# Patient Record
Sex: MALE | Race: WHITE | NOT HISPANIC OR LATINO | Employment: OTHER | URBAN - METROPOLITAN AREA
[De-identification: names, ages, dates, MRNs, and addresses within clinical notes are randomized per-mention and may not be internally consistent; named-entity substitution may affect disease eponyms.]

---

## 2017-10-11 ENCOUNTER — HOSPITAL ENCOUNTER (INPATIENT)
Facility: HOSPITAL | Age: 77
LOS: 9 days | Discharge: HOME HEALTH | End: 2017-10-20
Attending: INTERNAL MEDICINE | Admitting: INTERNAL MEDICINE

## 2017-10-11 DIAGNOSIS — Z74.09 IMPAIRED FUNCTIONAL MOBILITY, BALANCE, GAIT, AND ENDURANCE: ICD-10-CM

## 2017-10-11 DIAGNOSIS — Z78.9 IMPAIRED MOBILITY AND ADLS: ICD-10-CM

## 2017-10-11 DIAGNOSIS — Z74.09 IMPAIRED MOBILITY AND ADLS: ICD-10-CM

## 2017-10-11 DIAGNOSIS — E11.59 TYPE 2 DIABETES MELLITUS WITH OTHER CIRCULATORY COMPLICATION, UNSPECIFIED LONG TERM INSULIN USE STATUS: Chronic | ICD-10-CM

## 2017-10-11 DIAGNOSIS — C18.9 COLON CANCER (HCC): ICD-10-CM

## 2017-10-11 DIAGNOSIS — R13.13 PHARYNGEAL DYSPHAGIA: Primary | ICD-10-CM

## 2017-10-11 PROBLEM — I20.0 UNSTABLE ANGINA (HCC): Status: ACTIVE | Noted: 2017-10-11

## 2017-10-11 LAB
GLUCOSE BLDC GLUCOMTR-MCNC: 122 MG/DL (ref 70–130)
GLUCOSE BLDC GLUCOMTR-MCNC: 136 MG/DL (ref 70–130)
GLUCOSE BLDC GLUCOMTR-MCNC: 198 MG/DL (ref 70–130)

## 2017-10-11 PROCEDURE — 87070 CULTURE OTHR SPECIMN AEROBIC: CPT | Performed by: INTERNAL MEDICINE

## 2017-10-11 PROCEDURE — 82962 GLUCOSE BLOOD TEST: CPT

## 2017-10-11 PROCEDURE — 63710000001 INSULIN LISPRO (HUMAN) PER 5 UNITS: Performed by: INTERNAL MEDICINE

## 2017-10-11 PROCEDURE — 87077 CULTURE AEROBIC IDENTIFY: CPT | Performed by: INTERNAL MEDICINE

## 2017-10-11 PROCEDURE — 87186 SC STD MICRODIL/AGAR DIL: CPT | Performed by: INTERNAL MEDICINE

## 2017-10-11 PROCEDURE — 93005 ELECTROCARDIOGRAM TRACING: CPT | Performed by: INTERNAL MEDICINE

## 2017-10-11 PROCEDURE — 87205 SMEAR GRAM STAIN: CPT | Performed by: INTERNAL MEDICINE

## 2017-10-11 PROCEDURE — 87147 CULTURE TYPE IMMUNOLOGIC: CPT | Performed by: INTERNAL MEDICINE

## 2017-10-11 RX ORDER — ASPIRIN 81 MG/1
81 TABLET ORAL DAILY
Status: DISCONTINUED | OUTPATIENT
Start: 2017-10-11 | End: 2017-10-12

## 2017-10-11 RX ORDER — DIPHENHYDRAMINE HCL 50 MG
50 CAPSULE ORAL NIGHTLY PRN
COMMUNITY

## 2017-10-11 RX ORDER — CEPHALEXIN 500 MG/1
500 CAPSULE ORAL 4 TIMES DAILY
COMMUNITY
End: 2017-10-20 | Stop reason: HOSPADM

## 2017-10-11 RX ORDER — LEVOTHYROXINE SODIUM 0.07 MG/1
75 TABLET ORAL DAILY
COMMUNITY

## 2017-10-11 RX ORDER — DIPHENHYDRAMINE HCL 50 MG
50 CAPSULE ORAL NIGHTLY PRN
Status: DISCONTINUED | OUTPATIENT
Start: 2017-10-11 | End: 2017-10-12

## 2017-10-11 RX ORDER — DEXTROSE MONOHYDRATE 25 G/50ML
25 INJECTION, SOLUTION INTRAVENOUS
Status: DISCONTINUED | OUTPATIENT
Start: 2017-10-11 | End: 2017-10-17

## 2017-10-11 RX ORDER — ALUMINA, MAGNESIA, AND SIMETHICONE 2400; 2400; 240 MG/30ML; MG/30ML; MG/30ML
30 SUSPENSION ORAL EVERY 6 HOURS PRN
COMMUNITY

## 2017-10-11 RX ORDER — ERGOCALCIFEROL 1.25 MG/1
5000 CAPSULE ORAL DAILY
COMMUNITY

## 2017-10-11 RX ORDER — UREA 10 %
1 LOTION (ML) TOPICAL DAILY
COMMUNITY

## 2017-10-11 RX ORDER — NICOTINE POLACRILEX 4 MG
15 LOZENGE BUCCAL
Status: DISCONTINUED | OUTPATIENT
Start: 2017-10-11 | End: 2017-10-17

## 2017-10-11 RX ORDER — VALSARTAN 160 MG/1
160 TABLET ORAL DAILY
COMMUNITY

## 2017-10-11 RX ORDER — ACETAMINOPHEN 325 MG/1
650 TABLET ORAL EVERY 4 HOURS PRN
Status: DISCONTINUED | OUTPATIENT
Start: 2017-10-11 | End: 2017-10-12

## 2017-10-11 RX ORDER — ACETAMINOPHEN 325 MG/1
650 TABLET ORAL EVERY 4 HOURS PRN
COMMUNITY

## 2017-10-11 RX ORDER — LEVOTHYROXINE SODIUM 0.07 MG/1
75 TABLET ORAL
Status: DISCONTINUED | OUTPATIENT
Start: 2017-10-12 | End: 2017-10-20 | Stop reason: HOSPADM

## 2017-10-11 RX ORDER — ONDANSETRON 4 MG/1
4 TABLET, ORALLY DISINTEGRATING ORAL EVERY 6 HOURS PRN
COMMUNITY
End: 2017-10-20 | Stop reason: HOSPADM

## 2017-10-11 RX ORDER — VALSARTAN 160 MG/1
160 TABLET ORAL
Status: DISCONTINUED | OUTPATIENT
Start: 2017-10-12 | End: 2017-10-20 | Stop reason: HOSPADM

## 2017-10-11 RX ORDER — WARFARIN SODIUM 7.5 MG/1
7.5 TABLET ORAL
COMMUNITY

## 2017-10-11 RX ADMIN — INSULIN LISPRO 2 UNITS: 100 INJECTION, SOLUTION INTRAVENOUS; SUBCUTANEOUS at 22:02

## 2017-10-11 RX ADMIN — ASPIRIN 81 MG: 81 TABLET, COATED ORAL at 22:02

## 2017-10-11 NOTE — NURSING NOTE
"  ACC REVIEW REPORT: [facility]        PATIENT NAME: Jasmyne Hannon    PATIENT ID: [id number]    BED: 9801    BED TYPE: [facility]    BED GIVEN TO: MIREILLE HOLMAN RN    TIME BED GIVEN: 1551    YOB: 1940    AGE: 77    GENDER: MALE    PREVIOUS ADMIT TO [facility]:      PREVIOUS ADMISSION DATE:      PATIENT CLASS:      TODAY'S DATE: 10/11/2017    TRANSFER DATE: 10/11/17    ETA:      TRANSFERRING FACILITY: [facility]    TRANSFERRING FACILITY PHONE # : [phone]    TRANSFERRING MD:  DR. DAMICO    DATE/TIME REQUEST RECEIVED: 10/11/17 1520    [facility] RN: MARGARITO BRYSON RN    REPORT FROM: ABISAI MCDONNELL RN AND MIREILLE HOLMAN RN    TIME REPORT TAKEN: 1551    DIAGNOSIS: ABNORMAL STRESS TEST AND [place]    REASON FOR TRANSFER TO [facility]: HIGHER LEVEL OF CARE    TRANSPORTATION: AMBULANCE    CLINICAL REASON FOR TRANSFER TO [facility]: ADM 10/5/17 WITH GI BLEED. RECEIVED \"A COUPLE OF UNITS OF BLOOD\"  HAD A COLONOSCOPY THAT SHOWED A MASS. A STRESS TEST WAS DONE TO CLEAR PT FOR SURGERY. THE STRESS  TEST CAME BACK SHOWING ISCHEMIA. PT SENT HERE FOR HEART CATH.      CLINICAL INFORMATION    ALLERGIES: NKA    DURAND: NO    INFECTIOUS DISEASE: NO    ISOLATION: NO    LAST VITAL SIGNS:  TIME: 0800  TEMP: 98.3  PULSE: 51  B/P: 149/91  RESP: 20    LAB INFORMATION: NURSE STATES SHE DOES NOT HAVE ANY LABS TO GIVE ME.    MEDS/IV FLUIDS: LEFT AC 20G SL      CARDIAC SYSTEM:    CHEST PAIN: NO    RHYTHM: SINUS BRADYCARDIA    Is patient taking or has patient been given any drugs that could increase bleeding? NURSE IS NOT SURE  (Plavix, Brilinta, Effient, Eliquis, Xarelto, Warfarin, Integrilin, Angiomax)      RESPIRATORY SYSTEM:    OXYGEN: NO    O2 SAT: 94%    RESPIRATORY STATUS: NO S/S RESPIRATORY DISTRESS      CNS/MUSCULOSKELETAL    ALERT AND ORIENTED:    PERSON: Y  PLACE: Y  TIME: Y    XI COMA SCALE:    E: 4  M: 6  V: 5    GI//GY      GI//GY NOTES: HAS BEEN NPO SINCE [place] EXCEPT FOR COKE AFTER STRESS TEST    PAST MEDICAL HISTORY: " HTN,CHF,DM,ANEMIA,HYPOTHYROIDISM,PVD,STOMACH ULCERS,ANXIETY,DEPRESSION,  RIGHT BKA AND CHARCOT FOOT    ADDITIONAL NOTES: AMBULATES WITH PROSTHETIC LEG. HAS MEPILEX ON COCCYX OVER PILONIDAL CYST.PT IS [facility].          Angela Ibanez RN  10/11/2017  4:15 PM

## 2017-10-12 ENCOUNTER — APPOINTMENT (OUTPATIENT)
Dept: CARDIOLOGY | Facility: HOSPITAL | Age: 77
End: 2017-10-12
Attending: INTERNAL MEDICINE

## 2017-10-12 ENCOUNTER — HOSPITAL ENCOUNTER (OUTPATIENT)
Facility: HOSPITAL | Age: 77
Setting detail: HOSPITAL OUTPATIENT SURGERY
End: 2017-10-12
Attending: INTERNAL MEDICINE | Admitting: INTERNAL MEDICINE

## 2017-10-12 PROBLEM — C18.9 COLON CANCER (HCC): Status: ACTIVE | Noted: 2017-10-12

## 2017-10-12 PROBLEM — G47.33 OSA (OBSTRUCTIVE SLEEP APNEA): Status: ACTIVE | Noted: 2017-10-12

## 2017-10-12 PROBLEM — I73.9 PVD (PERIPHERAL VASCULAR DISEASE): Status: ACTIVE | Noted: 2017-10-12

## 2017-10-12 PROBLEM — I24.9 ACS (ACUTE CORONARY SYNDROME): Status: RESOLVED | Noted: 2017-10-12 | Resolved: 2017-10-12

## 2017-10-12 PROBLEM — I10 ESSENTIAL HYPERTENSION: Chronic | Status: ACTIVE | Noted: 2017-10-12

## 2017-10-12 PROBLEM — E03.9 ACQUIRED HYPOTHYROIDISM: Status: ACTIVE | Noted: 2017-10-12

## 2017-10-12 PROBLEM — E11.59 TYPE 2 DIABETES MELLITUS WITH CIRCULATORY DISORDER (HCC): Chronic | Status: ACTIVE | Noted: 2017-10-12

## 2017-10-12 PROBLEM — I24.9 ACS (ACUTE CORONARY SYNDROME): Status: ACTIVE | Noted: 2017-10-12

## 2017-10-12 PROBLEM — I20.0 UNSTABLE ANGINA: Status: RESOLVED | Noted: 2017-10-11 | Resolved: 2017-10-12

## 2017-10-12 PROBLEM — D50.0 IRON DEFICIENCY ANEMIA DUE TO CHRONIC BLOOD LOSS: Status: ACTIVE | Noted: 2017-10-12

## 2017-10-12 LAB
ANION GAP SERPL CALCULATED.3IONS-SCNC: 6 MMOL/L (ref 3–11)
ARTICHOKE IGE QN: 83 MG/DL (ref 0–130)
BASOPHILS # BLD AUTO: 0.03 10*3/MM3 (ref 0–0.2)
BASOPHILS NFR BLD AUTO: 0.4 % (ref 0–1)
BH CV ECHO MEAS - AO MAX PG (FULL): 21.8 MMHG
BH CV ECHO MEAS - AO MAX PG: 27.9 MMHG
BH CV ECHO MEAS - AO MEAN PG (FULL): 11 MMHG
BH CV ECHO MEAS - AO MEAN PG: 14 MMHG
BH CV ECHO MEAS - AO ROOT AREA (BSA CORRECTED): 1.3
BH CV ECHO MEAS - AO ROOT AREA: 8.6 CM^2
BH CV ECHO MEAS - AO ROOT DIAM: 3.3 CM
BH CV ECHO MEAS - AO V2 MAX: 264 CM/SEC
BH CV ECHO MEAS - AO V2 MEAN: 169 CM/SEC
BH CV ECHO MEAS - AO V2 VTI: 48.6 CM
BH CV ECHO MEAS - AVA(I,A): 1.7 CM^2
BH CV ECHO MEAS - AVA(I,D): 1.7 CM^2
BH CV ECHO MEAS - AVA(V,A): 1.6 CM^2
BH CV ECHO MEAS - AVA(V,D): 1.6 CM^2
BH CV ECHO MEAS - BSA(HAYCOCK): 2.7 M^2
BH CV ECHO MEAS - BSA: 2.6 M^2
BH CV ECHO MEAS - BZI_BMI: 37.9 KILOGRAMS/M^2
BH CV ECHO MEAS - BZI_METRIC_HEIGHT: 188 CM
BH CV ECHO MEAS - BZI_METRIC_WEIGHT: 133.8 KG
BH CV ECHO MEAS - CONTRAST EF (2CH): 75.8 ML/M^2
BH CV ECHO MEAS - CONTRAST EF 4CH: 82.5 ML/M^2
BH CV ECHO MEAS - EDV(CUBED): 119.1 ML
BH CV ECHO MEAS - EDV(MOD-SP2): 124 ML
BH CV ECHO MEAS - EDV(MOD-SP4): 166 ML
BH CV ECHO MEAS - EDV(TEICH): 113.9 ML
BH CV ECHO MEAS - EF(CUBED): 77.3 %
BH CV ECHO MEAS - EF(MOD-SP2): 75.8 %
BH CV ECHO MEAS - EF(MOD-SP4): 82.5 %
BH CV ECHO MEAS - EF(TEICH): 69.3 %
BH CV ECHO MEAS - ESV(CUBED): 27 ML
BH CV ECHO MEAS - ESV(MOD-SP2): 30 ML
BH CV ECHO MEAS - ESV(MOD-SP4): 29 ML
BH CV ECHO MEAS - ESV(TEICH): 35 ML
BH CV ECHO MEAS - FS: 39 %
BH CV ECHO MEAS - IVS/LVPW: 1
BH CV ECHO MEAS - IVSD: 1 CM
BH CV ECHO MEAS - LA DIMENSION: 3.7 CM
BH CV ECHO MEAS - LA/AO: 1.1
BH CV ECHO MEAS - LAT PEAK E' VEL: 6.6 CM/SEC
BH CV ECHO MEAS - LV DIASTOLIC VOL/BSA (35-75): 64.8 ML/M^2
BH CV ECHO MEAS - LV MASS(C)D: 180.8 GRAMS
BH CV ECHO MEAS - LV MASS(C)DI: 70.5 GRAMS/M^2
BH CV ECHO MEAS - LV MAX PG: 6.1 MMHG
BH CV ECHO MEAS - LV MEAN PG: 3 MMHG
BH CV ECHO MEAS - LV SYSTOLIC VOL/BSA (12-30): 11.3 ML/M^2
BH CV ECHO MEAS - LV V1 MAX: 123 CM/SEC
BH CV ECHO MEAS - LV V1 MEAN: 79.1 CM/SEC
BH CV ECHO MEAS - LV V1 VTI: 23.2 CM
BH CV ECHO MEAS - LVIDD: 4.9 CM
BH CV ECHO MEAS - LVIDS: 3 CM
BH CV ECHO MEAS - LVLD AP2: 8.2 CM
BH CV ECHO MEAS - LVLD AP4: 8.4 CM
BH CV ECHO MEAS - LVLS AP2: 7 CM
BH CV ECHO MEAS - LVLS AP4: 6.9 CM
BH CV ECHO MEAS - LVOT AREA (M): 3.5 CM^2
BH CV ECHO MEAS - LVOT AREA: 3.5 CM^2
BH CV ECHO MEAS - LVOT DIAM: 2.1 CM
BH CV ECHO MEAS - LVPWD: 1 CM
BH CV ECHO MEAS - MED PEAK E' VEL: 5.45 CM/SEC
BH CV ECHO MEAS - MV DEC TIME: 0.27 SEC
BH CV ECHO MEAS - MV E MAX VEL: 80.9 CM/SEC
BH CV ECHO MEAS - PA ACC SLOPE: 690 CM/SEC^2
BH CV ECHO MEAS - PA ACC TIME: 0.09 SEC
BH CV ECHO MEAS - PA PR(ACCEL): 37.6 MMHG
BH CV ECHO MEAS - RVDD: 4.3 CM
BH CV ECHO MEAS - SI(AO): 162.2 ML/M^2
BH CV ECHO MEAS - SI(CUBED): 35.9 ML/M^2
BH CV ECHO MEAS - SI(LVOT): 31.3 ML/M^2
BH CV ECHO MEAS - SI(MOD-SP2): 36.7 ML/M^2
BH CV ECHO MEAS - SI(MOD-SP4): 53.4 ML/M^2
BH CV ECHO MEAS - SI(TEICH): 30.8 ML/M^2
BH CV ECHO MEAS - SV(AO): 415.7 ML
BH CV ECHO MEAS - SV(CUBED): 92.1 ML
BH CV ECHO MEAS - SV(LVOT): 80.4 ML
BH CV ECHO MEAS - SV(MOD-SP2): 94 ML
BH CV ECHO MEAS - SV(MOD-SP4): 137 ML
BH CV ECHO MEAS - SV(TEICH): 78.9 ML
BH CV ECHO MEAS - TAPSE (>1.6): 1.9 CM2
BH CV ECHO MEAS - TR MAX VEL: 249 CM/SEC
BH CV XLRA - RV BASE: 4.4 CM
BH CV XLRA - RV LENGTH: 8.4 CM
BH CV XLRA - RV MID: 3.9 CM
BH CV XLRA - TDI S': 9.4 CM/SEC
BUN BLD-MCNC: 22 MG/DL (ref 9–23)
BUN/CREAT SERPL: 20 (ref 7–25)
CALCIUM SPEC-SCNC: 9.4 MG/DL (ref 8.7–10.4)
CHLORIDE SERPL-SCNC: 103 MMOL/L (ref 99–109)
CHOLEST SERPL-MCNC: 124 MG/DL (ref 0–200)
CO2 SERPL-SCNC: 26 MMOL/L (ref 20–31)
CREAT BLD-MCNC: 1.1 MG/DL (ref 0.6–1.3)
DEPRECATED RDW RBC AUTO: 53.7 FL (ref 37–54)
E/E' RATIO: 13.6
EOSINOPHIL # BLD AUTO: 0.37 10*3/MM3 (ref 0–0.3)
EOSINOPHIL NFR BLD AUTO: 5.3 % (ref 0–3)
ERYTHROCYTE [DISTWIDTH] IN BLOOD BY AUTOMATED COUNT: 16 % (ref 11.3–14.5)
GFR SERPL CREATININE-BSD FRML MDRD: 65 ML/MIN/1.73
GLUCOSE BLD-MCNC: 150 MG/DL (ref 70–100)
GLUCOSE BLDC GLUCOMTR-MCNC: 127 MG/DL (ref 70–130)
GLUCOSE BLDC GLUCOMTR-MCNC: 145 MG/DL (ref 70–130)
GLUCOSE BLDC GLUCOMTR-MCNC: 150 MG/DL (ref 70–130)
GLUCOSE BLDC GLUCOMTR-MCNC: 151 MG/DL (ref 70–130)
GLUCOSE BLDC GLUCOMTR-MCNC: 163 MG/DL (ref 70–130)
HCT VFR BLD AUTO: 28.8 % (ref 38.9–50.9)
HDLC SERPL-MCNC: 32 MG/DL (ref 40–60)
HGB BLD-MCNC: 9 G/DL (ref 13.1–17.5)
IMM GRANULOCYTES # BLD: 0.02 10*3/MM3 (ref 0–0.03)
IMM GRANULOCYTES NFR BLD: 0.3 % (ref 0–0.6)
LEFT ATRIUM VOLUME INDEX: 36.3 ML/M2
LYMPHOCYTES # BLD AUTO: 1.62 10*3/MM3 (ref 0.6–4.8)
LYMPHOCYTES NFR BLD AUTO: 23.1 % (ref 24–44)
MCH RBC QN AUTO: 29.3 PG (ref 27–31)
MCHC RBC AUTO-ENTMCNC: 31.3 G/DL (ref 32–36)
MCV RBC AUTO: 93.8 FL (ref 80–99)
MONOCYTES # BLD AUTO: 1.04 10*3/MM3 (ref 0–1)
MONOCYTES NFR BLD AUTO: 14.8 % (ref 0–12)
NEUTROPHILS # BLD AUTO: 3.93 10*3/MM3 (ref 1.5–8.3)
NEUTROPHILS NFR BLD AUTO: 56.1 % (ref 41–71)
PLATELET # BLD AUTO: 234 10*3/MM3 (ref 150–450)
PMV BLD AUTO: 9.7 FL (ref 6–12)
POTASSIUM BLD-SCNC: 4.1 MMOL/L (ref 3.5–5.5)
RBC # BLD AUTO: 3.07 10*6/MM3 (ref 4.2–5.76)
SODIUM BLD-SCNC: 135 MMOL/L (ref 132–146)
TRIGL SERPL-MCNC: 130 MG/DL (ref 0–150)
TROPONIN I SERPL-MCNC: 0.02 NG/ML
WBC NRBC COR # BLD: 7.01 10*3/MM3 (ref 3.5–10.8)

## 2017-10-12 PROCEDURE — 85025 COMPLETE CBC W/AUTO DIFF WBC: CPT | Performed by: INTERNAL MEDICINE

## 2017-10-12 PROCEDURE — 86900 BLOOD TYPING SEROLOGIC ABO: CPT

## 2017-10-12 PROCEDURE — 0 IOPAMIDOL PER 1 ML: Performed by: INTERNAL MEDICINE

## 2017-10-12 PROCEDURE — B2111ZZ FLUOROSCOPY OF MULTIPLE CORONARY ARTERIES USING LOW OSMOLAR CONTRAST: ICD-10-PCS | Performed by: INTERNAL MEDICINE

## 2017-10-12 PROCEDURE — C1769 GUIDE WIRE: HCPCS | Performed by: INTERNAL MEDICINE

## 2017-10-12 PROCEDURE — G0378 HOSPITAL OBSERVATION PER HR: HCPCS

## 2017-10-12 PROCEDURE — 63710000001 INSULIN LISPRO (HUMAN) PER 5 UNITS: Performed by: INTERNAL MEDICINE

## 2017-10-12 PROCEDURE — 86901 BLOOD TYPING SEROLOGIC RH(D): CPT

## 2017-10-12 PROCEDURE — 99223 1ST HOSP IP/OBS HIGH 75: CPT | Performed by: INTERNAL MEDICINE

## 2017-10-12 PROCEDURE — 84484 ASSAY OF TROPONIN QUANT: CPT | Performed by: INTERNAL MEDICINE

## 2017-10-12 PROCEDURE — 25010000002 HEPARIN (PORCINE) PER 1000 UNITS: Performed by: INTERNAL MEDICINE

## 2017-10-12 PROCEDURE — 4A023N7 MEASUREMENT OF CARDIAC SAMPLING AND PRESSURE, LEFT HEART, PERCUTANEOUS APPROACH: ICD-10-PCS | Performed by: INTERNAL MEDICINE

## 2017-10-12 PROCEDURE — 82962 GLUCOSE BLOOD TEST: CPT

## 2017-10-12 PROCEDURE — C1894 INTRO/SHEATH, NON-LASER: HCPCS | Performed by: INTERNAL MEDICINE

## 2017-10-12 PROCEDURE — 80061 LIPID PANEL: CPT | Performed by: INTERNAL MEDICINE

## 2017-10-12 PROCEDURE — 25010000002 MIDAZOLAM PER 1 MG: Performed by: INTERNAL MEDICINE

## 2017-10-12 PROCEDURE — 80048 BASIC METABOLIC PNL TOTAL CA: CPT | Performed by: INTERNAL MEDICINE

## 2017-10-12 PROCEDURE — 93005 ELECTROCARDIOGRAM TRACING: CPT | Performed by: INTERNAL MEDICINE

## 2017-10-12 PROCEDURE — 93306 TTE W/DOPPLER COMPLETE: CPT

## 2017-10-12 PROCEDURE — 25010000002 SULFUR HEXAFLUORIDE MICROSPH 60.7-25 MG RECONSTITUTED SUSPENSION: Performed by: INTERNAL MEDICINE

## 2017-10-12 PROCEDURE — 93458 L HRT ARTERY/VENTRICLE ANGIO: CPT | Performed by: INTERNAL MEDICINE

## 2017-10-12 PROCEDURE — 25010000002 FENTANYL CITRATE (PF) 100 MCG/2ML SOLUTION: Performed by: INTERNAL MEDICINE

## 2017-10-12 RX ORDER — VALSARTAN 160 MG/1
160 TABLET ORAL DAILY
Status: DISCONTINUED | OUTPATIENT
Start: 2017-10-12 | End: 2017-10-12

## 2017-10-12 RX ORDER — MORPHINE SULFATE 4 MG/ML
1 INJECTION, SOLUTION INTRAMUSCULAR; INTRAVENOUS EVERY 4 HOURS PRN
Status: DISCONTINUED | OUTPATIENT
Start: 2017-10-12 | End: 2017-10-14

## 2017-10-12 RX ORDER — DIPHENHYDRAMINE HCL 50 MG
50 CAPSULE ORAL NIGHTLY PRN
Status: DISCONTINUED | OUTPATIENT
Start: 2017-10-12 | End: 2017-10-20 | Stop reason: HOSPADM

## 2017-10-12 RX ORDER — HEPARIN SODIUM 5000 [USP'U]/ML
5000 INJECTION, SOLUTION INTRAVENOUS; SUBCUTANEOUS EVERY 8 HOURS SCHEDULED
Status: DISCONTINUED | OUTPATIENT
Start: 2017-10-13 | End: 2017-10-13 | Stop reason: SDUPTHER

## 2017-10-12 RX ORDER — SODIUM CHLORIDE 9 MG/ML
250 INJECTION, SOLUTION INTRAVENOUS CONTINUOUS
Status: ACTIVE | OUTPATIENT
Start: 2017-10-12 | End: 2017-10-12

## 2017-10-12 RX ORDER — FENTANYL CITRATE 50 UG/ML
INJECTION, SOLUTION INTRAMUSCULAR; INTRAVENOUS AS NEEDED
Status: DISCONTINUED | OUTPATIENT
Start: 2017-10-12 | End: 2017-10-12 | Stop reason: HOSPADM

## 2017-10-12 RX ORDER — MIDAZOLAM HYDROCHLORIDE 1 MG/ML
INJECTION INTRAMUSCULAR; INTRAVENOUS AS NEEDED
Status: DISCONTINUED | OUTPATIENT
Start: 2017-10-12 | End: 2017-10-12 | Stop reason: HOSPADM

## 2017-10-12 RX ORDER — ALPRAZOLAM 0.25 MG/1
0.25 TABLET ORAL 3 TIMES DAILY PRN
Status: DISCONTINUED | OUTPATIENT
Start: 2017-10-12 | End: 2017-10-20 | Stop reason: HOSPADM

## 2017-10-12 RX ORDER — TEMAZEPAM 7.5 MG/1
7.5 CAPSULE ORAL NIGHTLY PRN
Status: DISCONTINUED | OUTPATIENT
Start: 2017-10-12 | End: 2017-10-20 | Stop reason: HOSPADM

## 2017-10-12 RX ORDER — SODIUM CHLORIDE 9 MG/ML
INJECTION, SOLUTION INTRAVENOUS CONTINUOUS PRN
Status: DISCONTINUED | OUTPATIENT
Start: 2017-10-12 | End: 2017-10-12 | Stop reason: HOSPADM

## 2017-10-12 RX ORDER — ASPIRIN 81 MG/1
81 TABLET, CHEWABLE ORAL DAILY
Status: DISCONTINUED | OUTPATIENT
Start: 2017-10-12 | End: 2017-10-12

## 2017-10-12 RX ORDER — ACETAMINOPHEN 325 MG/1
650 TABLET ORAL EVERY 4 HOURS PRN
Status: DISCONTINUED | OUTPATIENT
Start: 2017-10-12 | End: 2017-10-12

## 2017-10-12 RX ORDER — LIDOCAINE HYDROCHLORIDE 10 MG/ML
INJECTION, SOLUTION INFILTRATION; PERINEURAL AS NEEDED
Status: DISCONTINUED | OUTPATIENT
Start: 2017-10-12 | End: 2017-10-12 | Stop reason: HOSPADM

## 2017-10-12 RX ORDER — ACETAMINOPHEN 325 MG/1
650 TABLET ORAL EVERY 4 HOURS PRN
Status: DISCONTINUED | OUTPATIENT
Start: 2017-10-12 | End: 2017-10-20 | Stop reason: HOSPADM

## 2017-10-12 RX ORDER — LEVOTHYROXINE SODIUM 0.07 MG/1
75 TABLET ORAL DAILY
Status: DISCONTINUED | OUTPATIENT
Start: 2017-10-12 | End: 2017-10-12

## 2017-10-12 RX ORDER — HYDROCODONE BITARTRATE AND ACETAMINOPHEN 5; 325 MG/1; MG/1
1 TABLET ORAL EVERY 4 HOURS PRN
Status: DISCONTINUED | OUTPATIENT
Start: 2017-10-12 | End: 2017-10-15

## 2017-10-12 RX ORDER — NALOXONE HCL 0.4 MG/ML
0.4 VIAL (ML) INJECTION
Status: DISCONTINUED | OUTPATIENT
Start: 2017-10-12 | End: 2017-10-14

## 2017-10-12 RX ADMIN — ASPIRIN 81 MG: 81 TABLET, COATED ORAL at 08:53

## 2017-10-12 RX ADMIN — LEVOTHYROXINE SODIUM 75 MCG: 75 TABLET ORAL at 06:05

## 2017-10-12 RX ADMIN — VALSARTAN 160 MG: 160 TABLET, FILM COATED ORAL at 08:53

## 2017-10-12 RX ADMIN — SULFUR HEXAFLUORIDE 5 ML: KIT at 09:40

## 2017-10-12 RX ADMIN — INSULIN LISPRO 2 UNITS: 100 INJECTION, SOLUTION INTRAVENOUS; SUBCUTANEOUS at 08:53

## 2017-10-12 RX ADMIN — INSULIN LISPRO 2 UNITS: 100 INJECTION, SOLUTION INTRAVENOUS; SUBCUTANEOUS at 20:37

## 2017-10-12 RX ADMIN — SODIUM CHLORIDE 250 ML/HR: 9 INJECTION, SOLUTION INTRAVENOUS at 15:54

## 2017-10-12 NOTE — H&P
[facility] History & Physical    Referring Provider: Polo Weaver M.D.    Patient Care Team:  Wiliam Chu MD as PCP - General (Family Medicine)    Chief complaint No chief complaint on file.      Subjective .     History of present illness:  Elderly gentleman who is transferred from [facility] after being there for about a week with gastrointestinal bleeding and apparently found to have colon carcinoma.  They were suspicious of his heart because he has lots of risk factors for heart disease and has been very short of breath.  He describes his shortness of breath is not being able to get his breath occurring on a daily basis increasing over the past year made worse by exertion and diminished with rest.  It is of moderate intensity is not associated with any chest discomfort.  It can last for several hours.  He does have some swelling of his leg.  He had a heart catheterization over 20 years ago and does not recall having any blockages at that time.    Review of Systems   All systems were reviewed and negative except for:  Constitution:  positive for fatigue    History  Past Medical History:   Diagnosis Date   • CHF (congestive heart failure)    • CMT (Charcot-Collette-Tooth disease)    • Diabetes    • Hypertension    , Past Surgical History:   Procedure Laterality Date   • BELOW KNEE LEG AMPUTATION     • CARDIAC CATHETERIZATION     • CYST REMOVAL      pilonodal cyst on back removed.    , History reviewed. No pertinent family history., Social History   Substance Use Topics   • Smoking status: Former Smoker     Packs/day: 4.00     Years: 25.00     Types: Cigarettes   • Smokeless tobacco: Former User     Quit date: 1983   • Alcohol use No   , Prescriptions Prior to Admission   Medication Sig Dispense Refill Last Dose   • acetaminophen (TYLENOL) 325 MG tablet Take 650 mg by mouth Every 4 (Four) Hours As Needed for Mild Pain .   10/11/2017 at Unknown time   • aluminum-magnesium  hydroxide-simethicone (MAALOX MAX) 400-400-40 MG/5ML suspension Take 30 mL by mouth Every 6 (Six) Hours As Needed for Indigestion or Heartburn.      • cephalexin (KEFLEX) 500 MG capsule Take 500 mg by mouth 4 (Four) Times a Day.      • Cholecalciferol 5000 units tablet Take 1 tablet by mouth Daily.   10/10/2017 at Unknown time   • diphenhydrAMINE (BENADRYL) 50 MG capsule Take 50 mg by mouth At Night As Needed for Itching or Sleep.   10/10/2017 at Unknown time   • insulin aspart (novoLOG FLEXPEN) 100 UNIT/ML solution pen-injector sc pen Inject  under the skin 3 (Three) Times a Day With Meals. Sliding scale      • levothyroxine (SYNTHROID, LEVOTHROID) 75 MCG tablet Take 75 mcg by mouth Daily.   10/11/2017 at Unknown time   • metFORMIN (GLUCOPHAGE) 1000 MG tablet Take 1,500 mg by mouth Daily With Breakfast & Dinner. 1500 IN THE MORNING WITH BREAKFAST. 1000 AT NIGHT WITH DINNER.   10/11/2017 at Unknown time   • metoprolol tartrate (LOPRESSOR) 25 MG tablet Take 25 mg by mouth 2 (Two) Times a Day.   10/11/2017 at Unknown time   • Multiple Vitamin (THERAGRAN PO) Take 1 tablet by mouth Daily.   10/10/2017 at Unknown time   • ondansetron ODT (ZOFRAN-ODT) 4 MG disintegrating tablet Take 4 mg by mouth Every 6 (Six) Hours As Needed for Nausea or Vomiting.      • Prasterone, DHEA, 50 MG capsule Take 1 capsule by mouth Daily.      • valsartan (DIOVAN) 160 MG tablet Take 160 mg by mouth Daily.   10/11/2017 at Unknown time   • vitamin D (ERGOCALCIFEROL) 93681 units capsule capsule Take 5,000 Units by mouth Daily.   10/10/2017 at Unknown time   • warfarin (COUMADIN) 7.5 MG tablet Take 7.5 mg by mouth Daily. 10 MG Monday, Wednesday and Friday. 7.5 mg Sunday, Tuesday, Thursday, and Saturday.      • Zinc Sulfate 220 (50 Zn) MG tablet Take 1 tablet by mouth Daily.   10/10/2017 at Unknown time   , Scheduled Meds:    aspirin 81 mg Oral Daily   insulin lispro 0-7 Units Subcutaneous 4x Daily With Meals & Nightly   levothyroxine 75 mcg Oral  "Q AM   metoprolol tartrate 25 mg Oral Q12H   valsartan 160 mg Oral Q24H   , Continuous Infusions:   , PRN Meds:  •  acetaminophen  •  dextrose  •  dextrose  •  diphenhydrAMINE  •  glucagon (human recombinant) and Allergies:  Review of patient's allergies indicates no known allergies.    Objective     Vital Sign Min/Max for last 24 hours  Temp  Min: 98.2 °F (36.8 °C)  Max: 99.3 °F (37.4 °C)   BP  Min: 90/75  Max: 143/94   Pulse  Min: 49  Max: 63   Resp  Min: 18  Max: 18   SpO2  Min: 63 %  Max: 100 %   Flow (L/min)  Min: 2  Max: 3   Weight  Min: 295 lb 4.8 oz (134 kg)  Max: 295 lb 4.8 oz (134 kg)     Flowsheet Rows         First Filed Value    Admission Height  74\" (188 cm) Documented at 10/11/2017 1751    Admission Weight  295 lb 4.8 oz (134 kg) Documented at 10/11/2017 1751             Ejection Fraction  No results found for: EF    Echo EF Estimated  No results found for: ECHOEFEST    Nuclear Stress Ejection Fraction  No components found for: NUCEF    Cath Ejection Fraction Quantitative  No results found for: CATHEF    Physical Exam:     General Appearance:    Alert, cooperative, in no acute distress   Head:    Normocephalic, without obvious abnormality, atraumatic   Eyes:            Lids and lashes normal, conjunctivae and sclerae normal, no   icterus, no pallor, corneas clear, PERRLA   Ears:    Ears appear intact with no abnormalities noted   Throat:   No oral lesions, no thrush, oral mucosa moist   Neck:   No adenopathy, supple, trachea midline, no thyromegaly, no   carotid bruit, no JVD   Back:     No kyphosis present, no scoliosis present, no skin lesions,      erythema or scars, no tenderness to percussion or                   palpation,   range of motion normal   Lungs:     Clear to auscultation,respirations regular, even and                  unlabored    Heart:    Regular rhythm and normal rate, normal S1 and S2, no            murmur, no gallop, no rub, no click   Chest Wall:    No abnormalities observed "   Abdomen:     Normal bowel sounds, no masses, no organomegaly, soft        non-tender, non-distended, no guarding, no rebound                tenderness   Rectal:     Deferred   Extremities: He has a right BKA.  Trace pedal edema, pulses not palpable    Pulses:   Pulse the left leg is not palpable    Skin:   No bleeding, bruising or rash   Lymph nodes:   No palpable adenopathy   Neurologic:   Cranial nerves 2 - 12 grossly intact, sensation intact, DTR       present and equal bilaterally         Results Review:   I reviewed the patient's new clinical results.  Results from last 7 days  Lab Units 10/12/17  0437   WBC 10*3/mm3 7.01   HEMOGLOBIN g/dL 9.0*   HEMATOCRIT % 28.8*   PLATELETS 10*3/mm3 234       Results from last 7 days  Lab Units 10/12/17  0437   SODIUM mmol/L 135   POTASSIUM mmol/L 4.1   CHLORIDE mmol/L 103   CO2 mmol/L 26.0   BUN mg/dL 22   CREATININE mg/dL 1.10   GLUCOSE mg/dL 150*   CALCIUM mg/dL 9.4     No results found for: TROPONINT    Results from last 7 days  Lab Units 10/12/17  0437   CHOLESTEROL mg/dL 124   TRIGLYCERIDES mg/dL 130   HDL CHOL mg/dL 32*                   Assessment/Plan     Active Problems:    Unstable angina  Abnormal stress test showing ischemia  Diabetes  Hypertension  Hypercholesterolemia  Family history coronary artery disease  GI bleeding  Colon cancer    Plan cardiac catheterization with possible intervention.  Risks benefits alternatives procedure explained the patient, he understands to proceed, further treatment based upon the angiographic data  Hospitalist consult  Echocardiography    I discussed the patients findings and my recommendations with patient    Lloyd Gooden MD  10/12/17  6:38 AM

## 2017-10-12 NOTE — PLAN OF CARE
Problem: Patient Care Overview (Adult)  Goal: Plan of Care Review  Outcome: Ongoing (interventions implemented as appropriate)    10/12/17 0742   Coping/Psychosocial Response Interventions   Plan Of Care Reviewed With patient   Patient Care Overview   Progress no change   Outcome Evaluation   Outcome Summary/Follow up Plan Here for [facility], hospitalist consult for colon cancer       Goal: Adult Individualization and Mutuality  Outcome: Ongoing (interventions implemented as appropriate)    10/12/17 0742   Individualization   Patient Specific Preferences prosthetics   Patient Specific Interventions has 2 prosthetics, ACHS accuchecks       Goal: Discharge Needs Assessment  Outcome: Ongoing (interventions implemented as appropriate)    10/11/17 1923 10/12/17 0742   Discharge Needs Assessment   Discharge Disposition --  still a patient   Living Environment   Transportation Available car;family or friend will provide --          Problem: Cardiac Catheterization with/without PCI (Adult)  Goal: Signs and Symptoms of Listed Potential Problems Will be Absent or Manageable (Cardiac Catheterization with/without PCI)  Outcome: Ongoing (interventions implemented as appropriate)    10/12/17 0742   Cardiac Catheterization with/without PCI   Problems Assessed (Cardiac Catheterization) all   Problems Present (Cardiac Catheterization) none

## 2017-10-12 NOTE — CONSULTS
[facility] Medicine Services  HISTORY AND PHYSICAL    Primary Care Physician: Wiliam Chu MD    Subjective     Chief Complaint: weakness anemia  History of Present Illness:   78 yo admitted to [facility] for rectal bleeding and found to have a colon mass. Then he failed his preop stress test and has come to [place] for a heart cath which Dr Gooden did today and did not find any critical lesions.  Patient has no complaints.     Review of Systems   Otherwise complete 10 system ROS performed and negative except as mentioned in the HPI.    Past Medical History:   Diagnosis Date   • CHF (congestive heart failure)    • CMT (Charcot-Collette-Tooth disease)    • Diabetes    • Hypertension        Past Surgical History:   Procedure Laterality Date   • BELOW KNEE LEG AMPUTATION     • CARDIAC CATHETERIZATION     • CYST REMOVAL      pilonodal cyst on back removed.        History reviewed. No pertinent family history.no cancer. Just diabetes    Social History     Social History   • Marital status: Single     Spouse name: N/A   • Number of children: N/A   • Years of education: N/A     Occupational History   • Not on file.     Social History Main Topics   • Smoking status: Former Smoker     Packs/day: 4.00     Years: 25.00     Types: Cigarettes   • Smokeless tobacco: Former User     Quit date: 1983   • Alcohol use No   • Drug use: No   • Sexual activity: Defer     Other Topics Concern   • Not on file     Social History Narrative   • No narrative on file       Medications:  Prescriptions Prior to Admission   Medication Sig Dispense Refill Last Dose   • acetaminophen (TYLENOL) 325 MG tablet Take 650 mg by mouth Every 4 (Four) Hours As Needed for Mild Pain .   10/11/2017 at Unknown time   • aluminum-magnesium hydroxide-simethicone (MAALOX MAX) 400-400-40 MG/5ML suspension Take 30 mL by mouth Every 6 (Six) Hours As Needed for Indigestion or Heartburn.      • cephalexin (KEFLEX) 500 MG  "capsule Take 500 mg by mouth 4 (Four) Times a Day.      • Cholecalciferol 5000 units tablet Take 1 tablet by mouth Daily.   10/10/2017 at Unknown time   • diphenhydrAMINE (BENADRYL) 50 MG capsule Take 50 mg by mouth At Night As Needed for Itching or Sleep.   10/10/2017 at Unknown time   • insulin aspart (novoLOG FLEXPEN) 100 UNIT/ML solution pen-injector sc pen Inject  under the skin 3 (Three) Times a Day With Meals. Sliding scale      • levothyroxine (SYNTHROID, LEVOTHROID) 75 MCG tablet Take 75 mcg by mouth Daily.   10/11/2017 at Unknown time   • metFORMIN (GLUCOPHAGE) 1000 MG tablet Take 1,500 mg by mouth Daily With Breakfast & Dinner. 1500 IN THE MORNING WITH BREAKFAST. 1000 AT NIGHT WITH DINNER.   10/11/2017 at Unknown time   • metoprolol tartrate (LOPRESSOR) 25 MG tablet Take 25 mg by mouth 2 (Two) Times a Day.   10/11/2017 at Unknown time   • Multiple Vitamin (THERAGRAN PO) Take 1 tablet by mouth Daily.   10/10/2017 at Unknown time   • ondansetron ODT (ZOFRAN-ODT) 4 MG disintegrating tablet Take 4 mg by mouth Every 6 (Six) Hours As Needed for Nausea or Vomiting.      • Prasterone, DHEA, 50 MG capsule Take 1 capsule by mouth Daily.      • valsartan (DIOVAN) 160 MG tablet Take 160 mg by mouth Daily.   10/11/2017 at Unknown time   • vitamin D (ERGOCALCIFEROL) 43315 units capsule capsule Take 5,000 Units by mouth Daily.   10/10/2017 at Unknown time   • warfarin (COUMADIN) 7.5 MG tablet Take 7.5 mg by mouth Daily. 10 MG Monday, Wednesday and Friday. 7.5 mg Sunday, Tuesday, Thursday, and Saturday.      • Zinc Sulfate 220 (50 Zn) MG tablet Take 1 tablet by mouth Daily.   10/10/2017 at Unknown time       Allergies:  No Known Allergies      Objective     Physical Exam:  Vital Signs: /72  Pulse 53  Temp 98 °F (36.7 °C) (Oral)   Resp 18  Ht 74\" (188 cm)  Wt 295 lb (134 kg)  SpO2 98%  BMI 37.88 kg/m2  Physical Exam   Patient is alert and talkative in no distress at rest  Neck is without mass or JVD  Heart is " "Reg wo murmur  Lungs are clear wo wheeze or crackle  Abd is soft without HSM or mass  MAEW- right BKA  Skin is without rash  Neurologic exam in nonfocal   Mood is appropriate        Results Reviewed:    Results from last 7 days  Lab Units 10/12/17  0437   WBC 10*3/mm3 7.01   HEMOGLOBIN g/dL 9.0*   PLATELETS 10*3/mm3 234       Results from last 7 days  Lab Units 10/12/17  0437   SODIUM mmol/L 135   POTASSIUM mmol/L 4.1   CO2 mmol/L 26.0   CREATININE mg/dL 1.10   GLUCOSE mg/dL 150*   CALCIUM mg/dL 9.4     Reviewed outside films  ECHO:  Interpretation Summary      · Left ventricular systolic function is hyperdynamic (EF > 70).  · Right ventricular cavity is dilated.  · Left atrial cavity size is dilated.  · calcification of the aortic valve  · Mild aortic valve stenosis is present.       I have personally reviewed and interpreted available lab data, radiology studies and ECG obtained at time of admission.     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  By report his C had no critical lesions  Plan:  Plan for hemicolectomy tomorrow with Dr. Worthy.  Will follow for perioperative care.  Will use insulin for his glucose,   Cont synthroid.  To OR tomorrow with Dr. Worthy  Discussed plan with patient and his son  Has been on coumadin for \"years\" for heart failure, but I believe it might be for slow a fib.   Monitor foe sleep apnea-- witness ox sats of 60% while sleeping earlier.    DVT prophylaxis: heparin  Code Status: full  Admission Status: Patient will be admitted to INPATIENT status due to the need for care which can only " be reasonably provided in an hospital setting such as aggressive/expedited ancillary services and/or consultation services, the necessity for IV medications, close physician monitoring and/or the possible need for procedures.  In such, I feel patient’s risk for adverse outcomes and need for care warrant INPATIENT evaluation and predict the patient’s care encounter to likely last beyond 2 midnights.       Lady Lopez MD 10/12/17 7:51 PM

## 2017-10-12 NOTE — CONSULTS
Colon and Rectal [[facility]]    Patient Care Team:  Wiliam Chu MD as PCP - General (Family Medicine)    Chief complaint    Subjective     HPI: 77-year-old white male who never would have a colonoscopy.  He presented with bleeding to [facility] and colonoscopy and CT scan showed a mid right colon cancer.  Biopsy still pending.  Because of the question of heart failure he was transferred here for heart catheter which did not show any heart failure.  I was asked to see him for surgical intervention.  He has no other GI history.  No family history of colon cancer.  I don't have the CT results but my impression is that it suggests local disease.    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  Cardiac catheterization yesterday showed some minor obstructions.  Morbidly obese with diabetes and hypertension.  No MI stroke urologic pulmonary or mental issues.  Cigarettes were given up over 30 years ago.     History  Past Medical History:   Diagnosis Date   • CHF (congestive heart failure)    • CMT (Charcot-Collette-Tooth disease)    • Diabetes    • Hypertension      Past Surgical History:   Procedure Laterality Date   • BELOW KNEE LEG AMPUTATION     • CARDIAC CATHETERIZATION     • CYST REMOVAL      pilonodal cyst on back removed.      History reviewed. No pertinent family history.  Social History   Substance Use Topics   • Smoking status: Former Smoker     Packs/day: 4.00     Years: 25.00     Types: Cigarettes   • Smokeless tobacco: Former User     Quit date: 1983   • Alcohol use No     Prescriptions Prior to Admission   Medication Sig Dispense Refill Last Dose   • acetaminophen (TYLENOL) 325 MG tablet Take 650 mg by mouth Every 4 (Four) Hours As Needed for Mild Pain .   10/11/2017 at Unknown time   • aluminum-magnesium hydroxide-simethicone (MAALOX MAX) 400-400-40 MG/5ML suspension Take  30 mL by mouth Every 6 (Six) Hours As Needed for Indigestion or Heartburn.      • cephalexin (KEFLEX) 500 MG capsule Take 500 mg by mouth 4 (Four) Times a Day.      • Cholecalciferol 5000 units tablet Take 1 tablet by mouth Daily.   10/10/2017 at Unknown time   • diphenhydrAMINE (BENADRYL) 50 MG capsule Take 50 mg by mouth At Night As Needed for Itching or Sleep.   10/10/2017 at Unknown time   • insulin aspart (novoLOG FLEXPEN) 100 UNIT/ML solution pen-injector sc pen Inject  under the skin 3 (Three) Times a Day With Meals. Sliding scale      • levothyroxine (SYNTHROID, LEVOTHROID) 75 MCG tablet Take 75 mcg by mouth Daily.   10/11/2017 at Unknown time   • metFORMIN (GLUCOPHAGE) 1000 MG tablet Take 1,500 mg by mouth Daily With Breakfast & Dinner. 1500 IN THE MORNING WITH BREAKFAST. 1000 AT NIGHT WITH DINNER.   10/11/2017 at Unknown time   • metoprolol tartrate (LOPRESSOR) 25 MG tablet Take 25 mg by mouth 2 (Two) Times a Day.   10/11/2017 at Unknown time   • Multiple Vitamin (THERAGRAN PO) Take 1 tablet by mouth Daily.   10/10/2017 at Unknown time   • ondansetron ODT (ZOFRAN-ODT) 4 MG disintegrating tablet Take 4 mg by mouth Every 6 (Six) Hours As Needed for Nausea or Vomiting.      • Prasterone, DHEA, 50 MG capsule Take 1 capsule by mouth Daily.      • valsartan (DIOVAN) 160 MG tablet Take 160 mg by mouth Daily.   10/11/2017 at Unknown time   • vitamin D (ERGOCALCIFEROL) 25096 units capsule capsule Take 5,000 Units by mouth Daily.   10/10/2017 at Unknown time   • warfarin (COUMADIN) 7.5 MG tablet Take 7.5 mg by mouth Daily. 10 MG Monday, Wednesday and Friday. 7.5 mg Sunday, Tuesday, Thursday, and Saturday.      • Zinc Sulfate 220 (50 Zn) MG tablet Take 1 tablet by mouth Daily.   10/10/2017 at Unknown time     Allergies:  Review of patient's allergies indicates no known allergies.    Objective     Vital Signs  Blood pressure 146/72, pulse 53, temperature 98 °F (36.7 °C), temperature source Oral, resp. rate 18, height  "74\" (188 cm), weight 295 lb (134 kg), SpO2 98 %.    Physical Exam:     Results Review:  Lab Results (last 24 hours)     Procedure Component Value Units Date/Time    POC Glucose Fingerstick [[id number]]  (Abnormal) Collected:  10/11/17 2110    Specimen:  Blood Updated:  10/11/17 2113     Glucose 198 (H) mg/dL     Narrative:       Meter: [id number] : [id number] [facility]    CBC & Differential [[id number]] Collected:  10/12/17 0437    Specimen:  Blood Updated:  10/12/17 0508    Narrative:       The following orders were created for panel order CBC & Differential.  Procedure                               Abnormality         Status                     ---------                               -----------         ------                     CBC Auto Differential[[id number]]        Abnormal            Final result                 Please view results for these tests on the individual orders.    CBC Auto Differential [[id number]]  (Abnormal) Collected:  10/12/17 0437    Specimen:  Blood Updated:  10/12/17 0508     WBC 7.01 10*3/mm3      RBC 3.07 (L) 10*6/mm3      Hemoglobin 9.0 (L) g/dL      Hematocrit 28.8 (L) %      MCV 93.8 fL      MCH 29.3 pg      MCHC 31.3 (L) g/dL      RDW 16.0 (H) %      RDW-SD 53.7 fl      MPV 9.7 fL      Platelets 234 10*3/mm3      Neutrophil % 56.1 %      Lymphocyte % 23.1 (L) %      Monocyte % 14.8 (H) %      Eosinophil % 5.3 (H) %      Basophil % 0.4 %      Immature Grans % 0.3 %      Neutrophils, Absolute 3.93 10*3/mm3      Lymphocytes, Absolute 1.62 10*3/mm3      Monocytes, Absolute 1.04 (H) 10*3/mm3      Eosinophils, Absolute 0.37 (H) 10*3/mm3      Basophils, Absolute 0.03 10*3/mm3      Immature Grans, Absolute 0.02 10*3/mm3     POC Glucose Fingerstick [[id number]]  (Abnormal) Collected:  10/12/17 0604    Specimen:  Blood Updated:  10/12/17 0611     Glucose 145 (H) mg/dL     Narrative:       Meter: [id number] : [id number] [facility]    Basic Metabolic Panel [[id number]]  (Abnormal) " Collected:  10/12/17 0437    Specimen:  Blood Updated:  10/12/17 0613     Glucose 150 (H) mg/dL      BUN 22 mg/dL      Creatinine 1.10 mg/dL      Sodium 135 mmol/L      Potassium 4.1 mmol/L      Chloride 103 mmol/L      CO2 26.0 mmol/L      Calcium 9.4 mg/dL      eGFR Non African Amer 65 mL/min/1.73      BUN/Creatinine Ratio 20.0     Anion Gap 6.0 mmol/L     Narrative:       [employer] Guidelines    Stage     Description        GFR  1         Normal or High     90+  2         Mild decrease      60-89  3         Moderate decrease  30-59  4         Severe decrease    15-29  5         Kidney failure     <15    Lipid Panel [[id number]]  (Abnormal) Collected:  10/12/17 0437    Specimen:  Blood Updated:  10/12/17 0613     Total Cholesterol 124 mg/dL      Triglycerides 130 mg/dL      HDL Cholesterol 32 (L) mg/dL      LDL Cholesterol  83 mg/dL     Narrative:       Cholesterol Reference Ranges:   Desirable       < 200 mg/dL   Borderline    200-239 mg/dL   High Risk       > 239 mg/dL    Triglyceride Reference Ranges:   Normal          < 150 mg/dL   Borderline    150-199 mg/dL   High          200-499 mg/dL   Very High       > 499 mg/dL    HDL Reference Ranges:   Low              < 40 mg/dL   High             > 59 mg/dL    LDL Reference Ranges:   Optimal         < 100 mg/dL   Near Optimal  100-129 mg/dL   Borderline    130-159 mg/dL   High          160-189 mg/dL   Very High       > 189 mg/dL    Troponin [[id number]]  (Normal) Collected:  10/12/17 0437    Specimen:  Blood Updated:  10/12/17 0613     Troponin I 0.021 ng/mL     POC Glucose Fingerstick [[id number]]  (Abnormal) Collected:  10/12/17 0842    Specimen:  Blood Updated:  10/12/17 0843     Glucose 150 (H) mg/dL     Narrative:       Meter: [id number] : [id number] [facility]    POC Glucose Fingerstick [[id number]]  (Abnormal) Collected:  10/12/17 1228    Specimen:  Blood Updated:  10/12/17 1229     Glucose 163 (H) mg/dL     Narrative:       Meter: [id number]  : [id number] [facility]    Wound Culture - Wound, Coccyx [[id number]]  (Normal) Collected:  10/11/17 2203    Specimen:  Wound from Coccyx Updated:  10/12/17 1414     Wound Culture No growth at 24 hours     Gram Stain Result No WBCs seen      Few (2+) Gram negative bacilli      Occasional Gram positive cocci in pairs    POC Glucose Fingerstick [[id number]]  (Normal) Collected:  10/12/17 1630    Specimen:  Blood Updated:  10/12/17 1631     Glucose 127 mg/dL     Narrative:       Meter: [id number] : [id number] [facility]         Imaging Results (last 24 hours)     ** No results found for the last 24 hours. **           Assessment/Plan     Principal Problem:    Colon cancer  Active Problems:    Unstable angina    Iron deficiency anemia due to chronic blood loss    PVD (peripheral vascular disease)    Acquired hypothyroidism    Type 2 diabetes mellitus with circulatory disorder    Essential hypertension    ACS (acute coronary syndrome)  Right colon cancer presenting with bleeding.  Diagnosed with colonoscopy and CT scan in [facility].  Right BK  Cardiac catheter uneventful yesterday or today  Morbid obesity with diabetes.    Recommendation: Discussed with patient, family and the hospitalist.  We'll proceed with open right colectomy tomorrow afternoon.      I discussed the patients findings and my recommendations with patient and family.     Keith Worthy MD  10/12/17  6:41 PM

## 2017-10-12 NOTE — PLAN OF CARE
Problem: Patient Care Overview (Adult)  Goal: Plan of Care Review    10/12/17 0900   Coping/Psychosocial Response Interventions   Plan Of Care Reviewed With patient   Patient Care Overview   Progress no change   Outcome Evaluation   Outcome Summary/Follow up Plan WOC nurse consulted to evaluate wound coccyx, which has been present for several years, apparently from old fistula repair. Wound irrigated with NS, packed with moist hydrofera then covered with hydrocolloid dressing. Plan to continue this treatment with change every 3 days. Recommend waffle cushion and pressure distribution when sitting. WOC nurse will f/u. Please contact WOC nurse with concerns. Thank you.

## 2017-10-13 LAB
ABO GROUP BLD: NORMAL
ABO GROUP BLD: NORMAL
BLD GP AB SCN SERPL QL: NEGATIVE
CEA SERPL-MCNC: 0.8 NG/ML (ref 0–2.5)
GLUCOSE BLDC GLUCOMTR-MCNC: 129 MG/DL (ref 70–130)
GLUCOSE BLDC GLUCOMTR-MCNC: 132 MG/DL (ref 70–130)
GLUCOSE BLDC GLUCOMTR-MCNC: 143 MG/DL (ref 70–130)
GLUCOSE BLDC GLUCOMTR-MCNC: 144 MG/DL (ref 70–130)
GLUCOSE BLDC GLUCOMTR-MCNC: 161 MG/DL (ref 70–130)
RH BLD: POSITIVE
RH BLD: POSITIVE

## 2017-10-13 PROCEDURE — 25010000002 HEPARIN (PORCINE) PER 1000 UNITS: Performed by: COLON & RECTAL SURGERY

## 2017-10-13 PROCEDURE — 86850 RBC ANTIBODY SCREEN: CPT | Performed by: INTERNAL MEDICINE

## 2017-10-13 PROCEDURE — 94660 CPAP INITIATION&MGMT: CPT

## 2017-10-13 PROCEDURE — 82378 CARCINOEMBRYONIC ANTIGEN: CPT | Performed by: INTERNAL MEDICINE

## 2017-10-13 PROCEDURE — 94799 UNLISTED PULMONARY SVC/PX: CPT

## 2017-10-13 PROCEDURE — 82962 GLUCOSE BLOOD TEST: CPT

## 2017-10-13 PROCEDURE — 99232 SBSQ HOSP IP/OBS MODERATE 35: CPT | Performed by: NURSE PRACTITIONER

## 2017-10-13 PROCEDURE — 93005 ELECTROCARDIOGRAM TRACING: CPT | Performed by: ANESTHESIOLOGY

## 2017-10-13 PROCEDURE — 86900 BLOOD TYPING SEROLOGIC ABO: CPT | Performed by: INTERNAL MEDICINE

## 2017-10-13 PROCEDURE — 86901 BLOOD TYPING SEROLOGIC RH(D): CPT | Performed by: INTERNAL MEDICINE

## 2017-10-13 RX ORDER — FAMOTIDINE 20 MG/1
20 TABLET, FILM COATED ORAL ONCE
Status: COMPLETED | OUTPATIENT
Start: 2017-10-13 | End: 2017-10-13

## 2017-10-13 RX ORDER — FAMOTIDINE 10 MG/ML
20 INJECTION, SOLUTION INTRAVENOUS ONCE
Status: COMPLETED | OUTPATIENT
Start: 2017-10-13 | End: 2017-10-13

## 2017-10-13 RX ORDER — HEPARIN SODIUM 5000 [USP'U]/ML
5000 INJECTION, SOLUTION INTRAVENOUS; SUBCUTANEOUS ONCE
Status: COMPLETED | OUTPATIENT
Start: 2017-10-13 | End: 2017-10-13

## 2017-10-13 RX ORDER — FAMOTIDINE 10 MG/ML
20 INJECTION, SOLUTION INTRAVENOUS EVERY 12 HOURS SCHEDULED
Status: CANCELLED | OUTPATIENT
Start: 2017-10-13

## 2017-10-13 RX ORDER — LIDOCAINE HYDROCHLORIDE 10 MG/ML
0.5 INJECTION, SOLUTION EPIDURAL; INFILTRATION; INTRACAUDAL; PERINEURAL ONCE AS NEEDED
Status: DISCONTINUED | OUTPATIENT
Start: 2017-10-13 | End: 2017-10-13 | Stop reason: HOSPADM

## 2017-10-13 RX ORDER — HEPARIN SODIUM 5000 [USP'U]/ML
5000 INJECTION, SOLUTION INTRAVENOUS; SUBCUTANEOUS
Status: DISCONTINUED | OUTPATIENT
Start: 2017-10-14 | End: 2017-10-14 | Stop reason: HOSPADM

## 2017-10-13 RX ORDER — SODIUM CHLORIDE 0.9 % (FLUSH) 0.9 %
1-10 SYRINGE (ML) INJECTION AS NEEDED
Status: DISCONTINUED | OUTPATIENT
Start: 2017-10-13 | End: 2017-10-13 | Stop reason: HOSPADM

## 2017-10-13 RX ORDER — HEPARIN SODIUM 5000 [USP'U]/ML
5000 INJECTION, SOLUTION INTRAVENOUS; SUBCUTANEOUS
Status: DISCONTINUED | OUTPATIENT
Start: 2017-10-13 | End: 2017-10-13 | Stop reason: SDUPTHER

## 2017-10-13 RX ORDER — SODIUM CHLORIDE, SODIUM LACTATE, POTASSIUM CHLORIDE, CALCIUM CHLORIDE 600; 310; 30; 20 MG/100ML; MG/100ML; MG/100ML; MG/100ML
9 INJECTION, SOLUTION INTRAVENOUS CONTINUOUS
Status: DISCONTINUED | OUTPATIENT
Start: 2017-10-13 | End: 2017-10-14

## 2017-10-13 RX ADMIN — METOPROLOL TARTRATE 12.5 MG: 25 TABLET, FILM COATED ORAL at 21:19

## 2017-10-13 RX ADMIN — ERTAPENEM SODIUM 1 G: 1 INJECTION, POWDER, LYOPHILIZED, FOR SOLUTION INTRAMUSCULAR; INTRAVENOUS at 05:54

## 2017-10-13 RX ADMIN — FAMOTIDINE 20 MG: 10 INJECTION, SOLUTION INTRAVENOUS at 08:35

## 2017-10-13 RX ADMIN — HEPARIN SODIUM 5000 UNITS: 5000 INJECTION, SOLUTION INTRAVENOUS; SUBCUTANEOUS at 21:19

## 2017-10-13 RX ADMIN — METOPROLOL TARTRATE 12.5 MG: 25 TABLET, FILM COATED ORAL at 08:35

## 2017-10-13 RX ADMIN — LEVOTHYROXINE SODIUM 75 MCG: 75 TABLET ORAL at 05:54

## 2017-10-13 RX ADMIN — FAMOTIDINE 20 MG: 20 TABLET ORAL at 08:35

## 2017-10-13 RX ADMIN — INSULIN LISPRO 2 UNITS: 100 INJECTION, SOLUTION INTRAVENOUS; SUBCUTANEOUS at 21:23

## 2017-10-13 RX ADMIN — SODIUM CHLORIDE, POTASSIUM CHLORIDE, SODIUM LACTATE AND CALCIUM CHLORIDE 9 ML/HR: 600; 310; 30; 20 INJECTION, SOLUTION INTRAVENOUS at 14:16

## 2017-10-13 NOTE — PROGRESS NOTES
"[facility] Progress Note     LOS: 1 day   Patient Care Team:  Wiliam Chu MD as PCP - General (Family Medicine)    Chief Complaint:  No chief complaint on file.      Subjective     Interval History:     Patient Complaints: chest pain, palpitations, sob, dizziness  Patient Denies: yes  History taken from: patient    Review of Systems:   All systems were reviewed and negative except for:  Constitution:  positive for fatigue        Objective     Vital Sign Min/Max for last 24 hours  Temp  Min: 97 °F (36.1 °C)  Max: 98 °F (36.7 °C)   BP  Min: 131/67  Max: 162/78   Pulse  Min: 49  Max: 81   Resp  Min: 15  Max: 18   SpO2  Min: 47 %  Max: 100 %   Flow (L/min)  Min: 3  Max: 4   Weight  Min: 295 lb (134 kg)  Max: 295 lb (134 kg)     Flowsheet Rows         First Filed Value    Admission Height  74\" (188 cm) Documented at 10/11/2017 1751    Admission Weight  295 lb 4.8 oz (134 kg) Documented at 10/11/2017 1751          Physical Exam:   General Appearance: alert, appears stated age and cooperative  Lungs: clear to auscultation, respirations regular, respirations even and respirations unlabored  Heart: regular rhythm & normal rate, normal S1, S2, no murmur, no arron, no rub and no click  Abdomen: normal bowel sounds, no masses, no hepatomegaly, no splenomegaly, soft non-tender, no guarding and no rebound tenderness  Extremities: Status post BKA  Pulses: Pulses palpable and equal bilaterally  Skin: no bleeding, bruising or rash  Psych: normal     Results Review:     I reviewed the patient's new clinical results.    Results from last 7 days  Lab Units 10/12/17  0437   SODIUM mmol/L 135   POTASSIUM mmol/L 4.1   CHLORIDE mmol/L 103   CO2 mmol/L 26.0   BUN mg/dL 22   CREATININE mg/dL 1.10   GLUCOSE mg/dL 150*   CALCIUM mg/dL 9.4       Results from last 7 days  Lab Units 10/12/17  0437   WBC 10*3/mm3 7.01   HEMOGLOBIN g/dL 9.0*   HEMATOCRIT % 28.8*   PLATELETS 10*3/mm3 234     No results found for: " TROPONINT    Results from last 7 days  Lab Units 10/12/17  0437   CHOLESTEROL mg/dL 124   TRIGLYCERIDES mg/dL 130   HDL CHOL mg/dL 32*               Ejection Fraction  No results found for: EF    Echo EF Estimated  No results found for: ECHOEFEST    Nuclear Stress Ejection Fraction  No components found for: NUCEF    Cath Ejection Fraction Quantitative  No results found for: CATHEF    Physical Exam    Medication Review: yes  Current Facility-Administered Medications   Medication Dose Route Frequency Provider Last Rate Last Dose   • acetaminophen (TYLENOL) tablet 650 mg  650 mg Oral Q4H PRN Lloyd Gooden MD       • ALPRAZolam (XANAX) tablet 0.25 mg  0.25 mg Oral TID PRN Lloyd Gooden MD       • dextrose (D50W) solution 25 g  25 g Intravenous Q15 Min PRN Lloyd Gooden MD       • dextrose (GLUTOSE) oral gel 15 g  15 g Oral Q15 Min PRN Lloyd Gooden MD       • diphenhydrAMINE (BENADRYL) capsule 50 mg  50 mg Oral Nightly PRN Lloyd Gooden MD       • ertapenem (INVanz) 1 g/100 mL 0.9% NS VTB (mbp)  1 g Intravenous Q24H Keith Worthy MD   1 g at 10/13/17 0554   • glucagon (GLUCAGEN) injection 1 mg  1 mg Subcutaneous Q15 Min PRN Lloyd Gooden MD       • heparin (porcine) 5000 UNIT/ML injection 5,000 Units  5,000 Units Subcutaneous Q8H Keith Worthy MD   Stopped at 10/13/17 0728   • HYDROcodone-acetaminophen (NORCO) 5-325 MG per tablet 1 tablet  1 tablet Oral Q4H PRN Lloyd Gooden MD       • influenza vac split quad (FLUZONE,FLUARIX,AFLURIA) injection 0.5 mL  0.5 mL Intramuscular During Hospitalization Lloyd Gooden MD       • insulin lispro (humaLOG) injection 0-7 Units  0-7 Units Subcutaneous 4x Daily With Meals & Nightly Lloyd Gooden MD   2 Units at 10/12/17 2037   • levothyroxine (SYNTHROID, LEVOTHROID) tablet 75 mcg  75 mcg Oral Q AM Lloyd Gooden MD   75 mcg at 10/13/17 0554   • metoprolol tartrate (LOPRESSOR) half tablet 12.5 mg  12.5 mg Oral  Q12H Lady Lopez MD   12.5 mg at 10/13/17 0835   • Morphine sulfate (PF) injection 1 mg  1 mg Intravenous Q4H PRN Lloyd Gooden MD        And   • naloxone (NARCAN) injection 0.4 mg  0.4 mg Intravenous Q5 Min PRN Lloyd Gooden MD       • temazepam (RESTORIL) capsule 7.5 mg  7.5 mg Oral Nightly PRN Lloyd Gooden MD       • valsartan (DIOVAN) tablet 160 mg  160 mg Oral Q24H Lloyd Gooden MD   160 mg at 10/12/17 0853         Assessment/Plan     Principal Problem:    Colon cancer  Active Problems:    Iron deficiency anemia due to chronic blood loss    Acquired hypothyroidism    Type 2 diabetes mellitus with circulatory disorder    Essential hypertension    MARISOL (obstructive sleep apnea)      Cardiac stable, and Dr. Cruz will take over as he attending    Lloyd Gooden MD  10/13/17  9:42 AM

## 2017-10-13 NOTE — PROGRESS NOTES
"Hospitalist  NOTE     Date of Admission: 10/11/2017   LOS: 1 day   PCP: Wiliam Chu MD  Mr. Jonah Hannon, 77 y.o. male is followed for:  Chief Complaint: weakness, anemia            SUBJECTIVE   HPI: patient resting in bed in NAD. He has been wearing cpap at HS. Tolerating but not liking it. Per nursing and pt no more blood in stools. He denies any soa, cp, fever, chills or n;v;d.   Subjective:  Symptoms:  No shortness of breath, chest pain or diarrhea.    Diet:  No nausea or vomiting.        The patient's relevant past medical, surgical and social history were reviewed and updated in Epic as appropriate.    Review of Systems:  Review of Systems   Constitutional: Negative for appetite change, chills and fever.   Respiratory: Negative for shortness of breath and wheezing.    Cardiovascular: Negative for chest pain and palpitations.   Gastrointestinal: Negative for abdominal pain, constipation, diarrhea, nausea and vomiting.   Genitourinary: Negative for dysuria.     Otherwise complete ROS negative except as mentioned in the HPI        OBJECTIVE     Vital Sign Min/Max for last 24 hours  Temp  Min: 97 °F (36.1 °C)  Max: 98 °F (36.7 °C)   BP  Min: 131/67  Max: 162/78   Pulse  Min: 49  Max: 81   Resp  Min: 15  Max: 18   SpO2  Min: 47 %  Max: 100 %   Flow (L/min)  Min: 3  Max: 4   Weight  Min: 295 lb (134 kg)  Max: 295 lb (134 kg)      Intake/Output Summary (Last 24 hours) at 10/13/17 0936  Last data filed at 10/13/17 0835   Gross per 24 hour   Intake                0 ml   Output             1600 ml   Net            -1600 ml      Telemetry:sb with pac  Last 3 weights    10/11/17  1751 10/12/17  1230   Weight: 295 lb 4.8 oz (134 kg) 295 lb (134 kg)          Objective:  Vital signs: (most recent): Blood pressure 151/74, pulse 60, temperature 97.6 °F (36.4 °C), temperature source Oral, resp. rate 18, height 74\" (188 cm), weight 295 lb (134 kg), SpO2 100 %.  No fever.              Physical Exam:   Constitutional: " Oriented to person, place, and time. Well-developed and well-nourished. No distress.   Head: Normocephalic and atraumatic.    Eyes: Conjunctivae and EOM are normal. No scleral icterus.   Neck: Normal range of motion.   Cardiovascular: Normal rate, irregular rhythm, normal heart sounds and intact distal pulses.  Exam reveals no gallop and no friction rub.  No murmur appreciated.  Pulmonary/Chest: Effort normal and breath sounds normal. No respiratory distress.  No rales rhonchi or wheezing   Abdominal: Soft.  Normal bowel sounds, no distention, no mass, no tenderness  Musculoskeletal: right BKA, left foot. Left foot deformity He exhibits no edema.   Neurological: No focal deficits   Skin: Skin is warm, pale  and dry. No rash noted. Nails show no clubbing.   Mood: Appropriate and pleasant  Results:    Results from last 7 days  Lab Units 10/12/17  0437   WBC 10*3/mm3 7.01   HEMOGLOBIN g/dL 9.0*   HEMATOCRIT % 28.8*   PLATELETS 10*3/mm3 234       Results from last 7 days  Lab Units 10/12/17  0437   SODIUM mmol/L 135   POTASSIUM mmol/L 4.1   CHLORIDE mmol/L 103   CO2 mmol/L 26.0   BUN mg/dL 22   CREATININE mg/dL 1.10   GLUCOSE mg/dL 150*   CALCIUM mg/dL 9.4             Imaging Results (last 24 hours)     ** No results found for the last 24 hours. **        Lab Results   Component Value Date    GLUCOSE 150 (H) 10/12/2017     Current Medications:  ertapenem 1 g Intravenous Q24H   heparin (porcine) 5,000 Units Subcutaneous Q8H   insulin lispro 0-7 Units Subcutaneous 4x Daily With Meals & Nightly   levothyroxine 75 mcg Oral Q AM   metoprolol tartrate 12.5 mg Oral Q12H   valsartan 160 mg Oral Q24H          I reviewed the patient's results and images. Images:    Medications reviewed.      Assessment/Plan   ASSESSMENT / PLAN    Principal Problem:    Colon cancer  Active Problems:    Iron deficiency anemia due to chronic blood loss    Acquired hypothyroidism    Type 2 diabetes mellitus with circulatory disorder    Essential  hypertension    MARISOL (obstructive sleep apnea)      76 yo with new anemia and colon mass without evidence of spread.  By report his C had no critical lesions  Plan:  Colon ca  Plan for hemicolectomy today at 1pm  with Dr. Worthy.  Will follow for perioperative care.    Dm  -- check A1C in am  -- cont s/s insulin    TSH  -- check tsh in am   -- Cont home synthroid.    Sleep apnea  -- sat 60's while sleeping using cpap at hs   -- consult cm to look into home machine.     HTN  -- controlled   -- -- lopressor and diovan    CAD  -- failed preop stress test transferred her for heart cath  -- HC 10/12/17 no critical lesions  -- ECHO ef >70%  -- on coumadin at home for possible afib    Assessment & Plan      Plan of care and goals reviewed with patient and staff.      I discussed the patient's findings and my recommendations with patient .     Anu GRACIA Kartik, APRN 10/13/17 9:36 AM     Brief Attending Note   I have seen and examined the patient, performing an independent face-to-face diagnostic evaluation with plan of care reviewed and developed with the advanced practice clinician (APC).  Brief Summary Statement/HPI:     Attending Physical Exam:  Patient is alert and talkative in no distress at rest  Neck is without mass or JVD  Heart is Reg wo murmur  Lungs are clear wo wheeze or crackle  Abd is soft without HSM or mass  MAEW  Skin is without rash  Neurologic exam in nonfocal   Mood is appropriate  Data:    Brief Assessment/Plan :  76 yo waiting for right hemicolectomy.  Dr. Gooden asked that we assume attending role and I accepted.  Discussed surgical plan with patient in the room.    See above for further detailed assessment and plan developed with APC which I have reviewed and/or edited.  Lady Lopez MD 10/13/17 4:26 PM

## 2017-10-13 NOTE — PROGRESS NOTES
Discharge Planning Assessment  [facility]     Patient Name: Jonah Hannon  MRN: [id number]  Today's Date: 10/13/2017    Admit Date: 10/11/2017          Discharge Needs Assessment       10/13/17 0945    Living Environment    Lives With friend(s)   Pt resides in [facility] and his friend Kathleen Palm has being staying with him the past few weeks    Living Arrangements house    Type of Financial/Environmental Concern none    Transportation Available car;family or friend will provide    Living Environment    Provides Primary Care For no one    Quality Of Family Relationships supportive;helpful;involved    Able to Return to Prior Living Arrangements yes    Discharge Needs Assessment    Concerns To Be Addressed no discharge needs identified;denies needs/concerns at this time    Readmission Within The Last 30 Days no previous admission in last 30 days    Equipment Currently Used at Home prosthesis;walker, standard;wheelchair    Equipment Needed After Discharge commode    Discharge Contact Information if Applicable [phone]            Discharge Plan       10/13/17 0992    Case Management/Social Work Plan    Plan home     Patient/Family In Agreement With Plan yes    Additional Comments CM spoke with pt at bedside. Pt has a friend that has being staying with him and he feels she will stay with him until he recovers from surgery. CM did discuss additional discharge plan opitons including skilled rehab and home health if he needs either option after his surgery.  Pt would like a bariatric bedside commode prior to discharge. CM will arrange prior to discharge if plan remains to return home and not skilled rehab. CM will continue to follow.        Discharge Placement     No information found                Demographic Summary       10/13/17 0942    Referral Information    Referral Source admission list    Contact Information    Permission Granted to Share Information With     Primary Care Physician Information     Name Jun Hughes            Functional Status       10/13/17 0943    Functional Status Current    Current Functional Level Comment see previous charting    Functional Status Prior    Ambulation 1-->assistive equipment    Transferring 1-->assistive equipment    Toileting 0-->independent    Bathing 0-->independent    Dressing 0-->independent    Eating 0-->independent    Communication 0-->understands/communicates without difficulty    Swallowing 0-->swallows foods/liquids without difficulty    IADL    Medications independent   Pt reports he has prescription drug coverage and denies issues obtaining or affording current meds    Meal Preparation independent    Housekeeping independent    Laundry independent    Shopping independent    Oral Care independent    Activity Tolerance    Current Activity Limitations none    Usual Activity Tolerance moderate    Current Activity Tolerance fair    Employment/Financial    Financial Concerns none   pt reports he has [employer] and denies concerns or disruption in coverage            Psychosocial     None            Abuse/Neglect     None            Legal     None            Substance Abuse     None            Patient Forms     None          Shari Magaña

## 2017-10-13 NOTE — NURSING NOTE
Dr Worthy informed patient that he has an emergency case that may likely cause patient to be cancelled until tomorrow morning. Spoke with OR desk, patient is definitely canceled and will be rescheduled for tomorrow morning.  Notified nurse on floor. Patient transferred back to floor

## 2017-10-13 NOTE — PLAN OF CARE
Problem: Patient Care Overview (Adult)  Goal: Plan of Care Review  Outcome: Ongoing (interventions implemented as appropriate)    Problem: Cardiac Catheterization with/without PCI (Adult)  Goal: Signs and Symptoms of Listed Potential Problems Will be Absent or Manageable (Cardiac Catheterization with/without PCI)  Outcome: Ongoing (interventions implemented as appropriate)

## 2017-10-13 NOTE — PROGRESS NOTES
"Colon and Rectal [CSGA]    HD 3    /80 (BP Location: Right arm, Patient Position: Lying)  Pulse 64  Temp 98.4 °F (36.9 °C) (Temporal Artery )   Resp 18  Ht 74\" (188 cm)  Wt 295 lb (134 kg)  SpO2 97%  BMI 37.88 kg/m2    Lab Results (last 24 hours)     Procedure Component Value Units Date/Time    POC Glucose Fingerstick [[id number]]  (Abnormal) Collected:  10/12/17 2031    Specimen:  Blood Updated:  10/12/17 2033     Glucose 151 (H) mg/dL     Narrative:       Meter: [id number] : [id number] Mariana Gentile    POC Glucose Fingerstick [[id number]]  (Abnormal) Collected:  10/13/17 0814    Specimen:  Blood Updated:  10/13/17 0815     Glucose 144 (H) mg/dL     Narrative:       Meter: [id number] : [id number] Noe Cordova    Wound Culture - Wound, Coccyx [[id number]]  (Abnormal) Collected:  10/11/17 2203    Specimen:  Wound from Coccyx Updated:  10/13/17 0819     Wound Culture --      Light growth (2+) Gram Negative Bacilli (A)     Gram Stain Result No WBCs seen      Few (2+) Gram negative bacilli      Occasional Gram positive cocci in pairs    CEA [[id number]]  (Normal) Collected:  10/13/17 0831    Specimen:  Blood Updated:  10/13/17 0911     CEA 0.80 ng/mL     Narrative:       The concentration of carcinoembryonic antigen (CEA) in a given specimen can vary due to differences in assay methods and reagent specificity. Values obtained with a different assay method cannot be used interchangeably. It is recommended that only on assay method be used consistently to monitor each patient's course of therapy.     Baseline values should be reestablished with any assay method change. CEA results should be interpreted in conjunction with information available from clinical evaluation and other diagnostic procedures.    POC Glucose Fingerstick [[id number]]  (Abnormal) Collected:  10/13/17 1143    Specimen:  Blood Updated:  10/13/17 1145     Glucose 132 (H) mg/dL     Narrative:       Meter: [id number] : [id number] " Noe Art    POC Glucose Fingerstick [[id number]]  (Normal) Collected:  10/13/17 1354    Specimen:  Blood Updated:  10/13/17 1356     Glucose 129 mg/dL     Narrative:       Meter: [id number] : [id number] Kalen Martinez    POC Glucose Fingerstick [[id number]]  (Abnormal) Collected:  10/13/17 1630    Specimen:  Blood Updated:  10/13/17 1639     Glucose 143 (H) mg/dL     Narrative:       Meter: [id number] : [id number] Benji Dillon          I/O this shift:  In: 0   Out: 550 [Urine:550]    Alert and oriented.  No nausea or vomiting.  Good pain control  Good UO. Labs stable  Because of an urgent procedure, we will have to postpone his operation until morning.  Keith Worthy MD  10/13/17  5:21 PM

## 2017-10-14 ENCOUNTER — ANESTHESIA (OUTPATIENT)
Dept: PERIOP | Facility: HOSPITAL | Age: 77
End: 2017-10-14

## 2017-10-14 ENCOUNTER — ANESTHESIA EVENT (OUTPATIENT)
Dept: PERIOP | Facility: HOSPITAL | Age: 77
End: 2017-10-14

## 2017-10-14 PROBLEM — I25.10 CAD IN NATIVE ARTERY: Status: ACTIVE | Noted: 2017-10-14

## 2017-10-14 PROBLEM — S31.000A SACRAL WOUND: Status: ACTIVE | Noted: 2017-10-14

## 2017-10-14 PROBLEM — Z90.49 S/P RIGHT HEMICOLECTOMY: Status: ACTIVE | Noted: 2017-10-14

## 2017-10-14 LAB
BACTERIA UR QL AUTO: ABNORMAL /HPF
BILIRUB UR QL STRIP: NEGATIVE
CLARITY UR: ABNORMAL
COLOR UR: YELLOW
GLUCOSE BLDC GLUCOMTR-MCNC: 180 MG/DL (ref 70–130)
GLUCOSE BLDC GLUCOMTR-MCNC: 200 MG/DL (ref 70–130)
GLUCOSE BLDC GLUCOMTR-MCNC: 201 MG/DL (ref 70–130)
GLUCOSE UR STRIP-MCNC: NEGATIVE MG/DL
HBA1C MFR BLD: 5.5 % (ref 4.8–5.6)
HGB UR QL STRIP.AUTO: NEGATIVE
HYALINE CASTS UR QL AUTO: ABNORMAL /LPF
KETONES UR QL STRIP: NEGATIVE
LEUKOCYTE ESTERASE UR QL STRIP.AUTO: NEGATIVE
NITRITE UR QL STRIP: NEGATIVE
PH UR STRIP.AUTO: 5.5 [PH] (ref 5–8)
PROT UR QL STRIP: ABNORMAL
RBC # UR: ABNORMAL /HPF
REF LAB TEST METHOD: ABNORMAL
SP GR UR STRIP: 1.02 (ref 1–1.03)
SQUAMOUS #/AREA URNS HPF: ABNORMAL /HPF
TSH SERPL DL<=0.05 MIU/L-ACNC: 2.48 MIU/ML (ref 0.35–5.35)
UROBILINOGEN UR QL STRIP: ABNORMAL
WBC UR QL AUTO: ABNORMAL /HPF

## 2017-10-14 PROCEDURE — 25010000002 NEOSTIGMINE PER 0.5 MG: Performed by: ANESTHESIOLOGY

## 2017-10-14 PROCEDURE — 25010000002 BUPRENORPHINE PER 0.1 MG: Performed by: ANESTHESIOLOGY

## 2017-10-14 PROCEDURE — 25010000002 HEPARIN (PORCINE) PER 1000 UNITS: Performed by: COLON & RECTAL SURGERY

## 2017-10-14 PROCEDURE — 63710000001 INSULIN LISPRO (HUMAN) PER 5 UNITS: Performed by: INTERNAL MEDICINE

## 2017-10-14 PROCEDURE — 25010000002 FENTANYL CITRATE (PF) 100 MCG/2ML SOLUTION: Performed by: ANESTHESIOLOGY

## 2017-10-14 PROCEDURE — 81001 URINALYSIS AUTO W/SCOPE: CPT | Performed by: COLON & RECTAL SURGERY

## 2017-10-14 PROCEDURE — 0DTF0ZZ RESECTION OF RIGHT LARGE INTESTINE, OPEN APPROACH: ICD-10-PCS | Performed by: COLON & RECTAL SURGERY

## 2017-10-14 PROCEDURE — 84443 ASSAY THYROID STIM HORMONE: CPT | Performed by: NURSE PRACTITIONER

## 2017-10-14 PROCEDURE — 88309 TISSUE EXAM BY PATHOLOGIST: CPT | Performed by: COLON & RECTAL SURGERY

## 2017-10-14 PROCEDURE — 83036 HEMOGLOBIN GLYCOSYLATED A1C: CPT | Performed by: NURSE PRACTITIONER

## 2017-10-14 PROCEDURE — 0WQF0ZZ REPAIR ABDOMINAL WALL, OPEN APPROACH: ICD-10-PCS | Performed by: COLON & RECTAL SURGERY

## 2017-10-14 PROCEDURE — 87086 URINE CULTURE/COLONY COUNT: CPT | Performed by: COLON & RECTAL SURGERY

## 2017-10-14 PROCEDURE — 25010000002 ONDANSETRON PER 1 MG: Performed by: ANESTHESIOLOGY

## 2017-10-14 PROCEDURE — 25010000002 PROPOFOL 10 MG/ML EMULSION: Performed by: ANESTHESIOLOGY

## 2017-10-14 PROCEDURE — 82962 GLUCOSE BLOOD TEST: CPT

## 2017-10-14 PROCEDURE — 25010000002 DEXAMETHASONE PER 1 MG: Performed by: ANESTHESIOLOGY

## 2017-10-14 PROCEDURE — 99232 SBSQ HOSP IP/OBS MODERATE 35: CPT | Performed by: INTERNAL MEDICINE

## 2017-10-14 RX ORDER — HYDROCODONE BITARTRATE AND ACETAMINOPHEN 7.5; 325 MG/1; MG/1
1 TABLET ORAL EVERY 4 HOURS PRN
Status: DISCONTINUED | OUTPATIENT
Start: 2017-10-14 | End: 2017-10-20 | Stop reason: HOSPADM

## 2017-10-14 RX ORDER — GLYCOPYRROLATE 0.2 MG/ML
INJECTION INTRAMUSCULAR; INTRAVENOUS AS NEEDED
Status: DISCONTINUED | OUTPATIENT
Start: 2017-10-14 | End: 2017-10-14 | Stop reason: SURG

## 2017-10-14 RX ORDER — PROMETHAZINE HYDROCHLORIDE 25 MG/ML
6.25 INJECTION, SOLUTION INTRAMUSCULAR; INTRAVENOUS ONCE AS NEEDED
Status: DISCONTINUED | OUTPATIENT
Start: 2017-10-14 | End: 2017-10-14 | Stop reason: HOSPADM

## 2017-10-14 RX ORDER — ATRACURIUM BESYLATE 10 MG/ML
INJECTION, SOLUTION INTRAVENOUS AS NEEDED
Status: DISCONTINUED | OUTPATIENT
Start: 2017-10-14 | End: 2017-10-14 | Stop reason: SURG

## 2017-10-14 RX ORDER — GABAPENTIN 300 MG/1
600 CAPSULE ORAL EVERY 12 HOURS SCHEDULED
Status: COMPLETED | OUTPATIENT
Start: 2017-10-14 | End: 2017-10-16

## 2017-10-14 RX ORDER — DEXAMETHASONE SODIUM PHOSPHATE 4 MG/ML
INJECTION, SOLUTION INTRA-ARTICULAR; INTRALESIONAL; INTRAMUSCULAR; INTRAVENOUS; SOFT TISSUE AS NEEDED
Status: DISCONTINUED | OUTPATIENT
Start: 2017-10-14 | End: 2017-10-14 | Stop reason: SURG

## 2017-10-14 RX ORDER — MAGNESIUM HYDROXIDE 1200 MG/15ML
LIQUID ORAL AS NEEDED
Status: DISCONTINUED | OUTPATIENT
Start: 2017-10-14 | End: 2017-10-14 | Stop reason: HOSPADM

## 2017-10-14 RX ORDER — ALVIMOPAN 12 MG/1
12 CAPSULE ORAL 2 TIMES DAILY
Status: DISCONTINUED | OUTPATIENT
Start: 2017-10-14 | End: 2017-10-17

## 2017-10-14 RX ORDER — FENTANYL CITRATE 50 UG/ML
INJECTION, SOLUTION INTRAMUSCULAR; INTRAVENOUS AS NEEDED
Status: DISCONTINUED | OUTPATIENT
Start: 2017-10-14 | End: 2017-10-14 | Stop reason: SURG

## 2017-10-14 RX ORDER — MORPHINE SULFATE 2 MG/ML
2 INJECTION, SOLUTION INTRAMUSCULAR; INTRAVENOUS
Status: DISCONTINUED | OUTPATIENT
Start: 2017-10-14 | End: 2017-10-17

## 2017-10-14 RX ORDER — SODIUM CHLORIDE 0.9 % (FLUSH) 0.9 %
1-10 SYRINGE (ML) INJECTION AS NEEDED
Status: DISCONTINUED | OUTPATIENT
Start: 2017-10-14 | End: 2017-10-20 | Stop reason: HOSPADM

## 2017-10-14 RX ORDER — BUPIVACAINE HYDROCHLORIDE 2.5 MG/ML
INJECTION, SOLUTION EPIDURAL; INFILTRATION; INTRACAUDAL AS NEEDED
Status: DISCONTINUED | OUTPATIENT
Start: 2017-10-14 | End: 2017-10-14 | Stop reason: SURG

## 2017-10-14 RX ORDER — PROMETHAZINE HYDROCHLORIDE 25 MG/1
25 SUPPOSITORY RECTAL ONCE AS NEEDED
Status: DISCONTINUED | OUTPATIENT
Start: 2017-10-14 | End: 2017-10-14 | Stop reason: HOSPADM

## 2017-10-14 RX ORDER — SODIUM CHLORIDE, SODIUM LACTATE, POTASSIUM CHLORIDE, CALCIUM CHLORIDE 600; 310; 30; 20 MG/100ML; MG/100ML; MG/100ML; MG/100ML
9 INJECTION, SOLUTION INTRAVENOUS CONTINUOUS
Status: DISCONTINUED | OUTPATIENT
Start: 2017-10-14 | End: 2017-10-18

## 2017-10-14 RX ORDER — PROPOFOL 10 MG/ML
VIAL (ML) INTRAVENOUS AS NEEDED
Status: DISCONTINUED | OUTPATIENT
Start: 2017-10-14 | End: 2017-10-14 | Stop reason: SURG

## 2017-10-14 RX ORDER — FENTANYL CITRATE 50 UG/ML
50 INJECTION, SOLUTION INTRAMUSCULAR; INTRAVENOUS
Status: DISCONTINUED | OUTPATIENT
Start: 2017-10-14 | End: 2017-10-14 | Stop reason: HOSPADM

## 2017-10-14 RX ORDER — ONDANSETRON 2 MG/ML
4 INJECTION INTRAMUSCULAR; INTRAVENOUS ONCE AS NEEDED
Status: DISCONTINUED | OUTPATIENT
Start: 2017-10-14 | End: 2017-10-14 | Stop reason: HOSPADM

## 2017-10-14 RX ORDER — HEPARIN SODIUM 5000 [USP'U]/ML
5000 INJECTION, SOLUTION INTRAVENOUS; SUBCUTANEOUS EVERY 8 HOURS SCHEDULED
Status: DISCONTINUED | OUTPATIENT
Start: 2017-10-15 | End: 2017-10-16

## 2017-10-14 RX ORDER — LIDOCAINE HYDROCHLORIDE 10 MG/ML
0.5 INJECTION, SOLUTION EPIDURAL; INFILTRATION; INTRACAUDAL; PERINEURAL ONCE AS NEEDED
Status: DISCONTINUED | OUTPATIENT
Start: 2017-10-14 | End: 2017-10-20 | Stop reason: HOSPADM

## 2017-10-14 RX ORDER — HYDROMORPHONE HYDROCHLORIDE 1 MG/ML
0.5 INJECTION, SOLUTION INTRAMUSCULAR; INTRAVENOUS; SUBCUTANEOUS
Status: DISCONTINUED | OUTPATIENT
Start: 2017-10-14 | End: 2017-10-14 | Stop reason: HOSPADM

## 2017-10-14 RX ORDER — DIAZEPAM 5 MG/1
5 TABLET ORAL EVERY 6 HOURS PRN
Status: DISCONTINUED | OUTPATIENT
Start: 2017-10-14 | End: 2017-10-20 | Stop reason: HOSPADM

## 2017-10-14 RX ORDER — FAMOTIDINE 10 MG/ML
20 INJECTION, SOLUTION INTRAVENOUS ONCE
Status: DISCONTINUED | OUTPATIENT
Start: 2017-10-14 | End: 2017-10-19

## 2017-10-14 RX ORDER — HEPARIN SODIUM 5000 [USP'U]/ML
INJECTION, SOLUTION INTRAVENOUS; SUBCUTANEOUS AS NEEDED
Status: DISCONTINUED | OUTPATIENT
Start: 2017-10-14 | End: 2017-10-14 | Stop reason: HOSPADM

## 2017-10-14 RX ORDER — ONDANSETRON 2 MG/ML
INJECTION INTRAMUSCULAR; INTRAVENOUS AS NEEDED
Status: DISCONTINUED | OUTPATIENT
Start: 2017-10-14 | End: 2017-10-14 | Stop reason: SURG

## 2017-10-14 RX ORDER — SODIUM CHLORIDE 9 MG/ML
100 INJECTION, SOLUTION INTRAVENOUS CONTINUOUS
Status: DISCONTINUED | OUTPATIENT
Start: 2017-10-14 | End: 2017-10-18

## 2017-10-14 RX ORDER — ACETAMINOPHEN 500 MG
1000 TABLET ORAL 3 TIMES DAILY
Status: DISCONTINUED | OUTPATIENT
Start: 2017-10-14 | End: 2017-10-20 | Stop reason: HOSPADM

## 2017-10-14 RX ORDER — ALUMINA, MAGNESIA, AND SIMETHICONE 2400; 2400; 240 MG/30ML; MG/30ML; MG/30ML
30 SUSPENSION ORAL EVERY 6 HOURS PRN
Status: DISCONTINUED | OUTPATIENT
Start: 2017-10-14 | End: 2017-10-20 | Stop reason: HOSPADM

## 2017-10-14 RX ORDER — LIDOCAINE HYDROCHLORIDE 10 MG/ML
INJECTION, SOLUTION INFILTRATION; PERINEURAL AS NEEDED
Status: DISCONTINUED | OUTPATIENT
Start: 2017-10-14 | End: 2017-10-14 | Stop reason: SURG

## 2017-10-14 RX ORDER — ONDANSETRON 2 MG/ML
4 INJECTION INTRAMUSCULAR; INTRAVENOUS EVERY 6 HOURS PRN
Status: DISCONTINUED | OUTPATIENT
Start: 2017-10-14 | End: 2017-10-20 | Stop reason: HOSPADM

## 2017-10-14 RX ORDER — BUPRENORPHINE HYDROCHLORIDE 0.32 MG/ML
INJECTION INTRAMUSCULAR; INTRAVENOUS AS NEEDED
Status: DISCONTINUED | OUTPATIENT
Start: 2017-10-14 | End: 2017-10-14 | Stop reason: SURG

## 2017-10-14 RX ORDER — FAMOTIDINE 20 MG/1
20 TABLET, FILM COATED ORAL ONCE
Status: DISCONTINUED | OUTPATIENT
Start: 2017-10-14 | End: 2017-10-14 | Stop reason: ALTCHOICE

## 2017-10-14 RX ORDER — PROMETHAZINE HYDROCHLORIDE 25 MG/1
25 TABLET ORAL ONCE AS NEEDED
Status: DISCONTINUED | OUTPATIENT
Start: 2017-10-14 | End: 2017-10-14 | Stop reason: HOSPADM

## 2017-10-14 RX ORDER — NALOXONE HCL 0.4 MG/ML
0.4 VIAL (ML) INJECTION
Status: DISCONTINUED | OUTPATIENT
Start: 2017-10-14 | End: 2017-10-20 | Stop reason: HOSPADM

## 2017-10-14 RX ADMIN — HYDROCODONE BITARTRATE AND ACETAMINOPHEN 1 TABLET: 7.5; 325 TABLET ORAL at 12:50

## 2017-10-14 RX ADMIN — EPHEDRINE SULFATE 5 MG: 50 INJECTION INTRAMUSCULAR; INTRAVENOUS; SUBCUTANEOUS at 08:33

## 2017-10-14 RX ADMIN — Medication 3 MG: at 10:06

## 2017-10-14 RX ADMIN — GABAPENTIN 600 MG: 300 CAPSULE ORAL at 12:46

## 2017-10-14 RX ADMIN — INSULIN LISPRO 3 UNITS: 100 INJECTION, SOLUTION INTRAVENOUS; SUBCUTANEOUS at 17:13

## 2017-10-14 RX ADMIN — PROPOFOL 200 MG: 10 INJECTION, EMULSION INTRAVENOUS at 08:33

## 2017-10-14 RX ADMIN — INSULIN LISPRO 2 UNITS: 100 INJECTION, SOLUTION INTRAVENOUS; SUBCUTANEOUS at 12:47

## 2017-10-14 RX ADMIN — BUPIVACAINE HYDROCHLORIDE 60 ML: 2.5 INJECTION, SOLUTION EPIDURAL; INFILTRATION; INTRACAUDAL; PERINEURAL at 08:40

## 2017-10-14 RX ADMIN — SODIUM CHLORIDE 100 ML/HR: 9 INJECTION, SOLUTION INTRAVENOUS at 21:32

## 2017-10-14 RX ADMIN — FENTANYL CITRATE 50 MCG: 50 INJECTION, SOLUTION INTRAMUSCULAR; INTRAVENOUS at 09:35

## 2017-10-14 RX ADMIN — METOPROLOL TARTRATE 12.5 MG: 25 TABLET, FILM COATED ORAL at 20:57

## 2017-10-14 RX ADMIN — GLYCOPYRROLATE 0.4 MG: 0.2 INJECTION, SOLUTION INTRAMUSCULAR; INTRAVENOUS at 10:06

## 2017-10-14 RX ADMIN — LIDOCAINE HYDROCHLORIDE 50 MG: 10 INJECTION, SOLUTION INFILTRATION; PERINEURAL at 08:33

## 2017-10-14 RX ADMIN — EPHEDRINE SULFATE 5 MG: 50 INJECTION INTRAMUSCULAR; INTRAVENOUS; SUBCUTANEOUS at 08:45

## 2017-10-14 RX ADMIN — ATRACURIUM BESYLATE 10 MG: 10 INJECTION, SOLUTION INTRAVENOUS at 08:53

## 2017-10-14 RX ADMIN — SODIUM CHLORIDE 100 ML/HR: 9 INJECTION, SOLUTION INTRAVENOUS at 12:06

## 2017-10-14 RX ADMIN — GLYCOPYRROLATE 0.2 MG: 0.2 INJECTION, SOLUTION INTRAMUSCULAR; INTRAVENOUS at 09:13

## 2017-10-14 RX ADMIN — GABAPENTIN 600 MG: 300 CAPSULE ORAL at 20:56

## 2017-10-14 RX ADMIN — FENTANYL CITRATE 100 MCG: 50 INJECTION, SOLUTION INTRAMUSCULAR; INTRAVENOUS at 08:33

## 2017-10-14 RX ADMIN — FENTANYL CITRATE 50 MCG: 50 INJECTION, SOLUTION INTRAMUSCULAR; INTRAVENOUS at 10:58

## 2017-10-14 RX ADMIN — BUPRENORPHINE HYDROCHLORIDE 0.3 MG: 0.3 INJECTION INTRAMUSCULAR; INTRAVENOUS at 08:40

## 2017-10-14 RX ADMIN — METOPROLOL TARTRATE 12.5 MG: 25 TABLET, FILM COATED ORAL at 06:30

## 2017-10-14 RX ADMIN — FENTANYL CITRATE 50 MCG: 50 INJECTION, SOLUTION INTRAMUSCULAR; INTRAVENOUS at 10:06

## 2017-10-14 RX ADMIN — ONDANSETRON 4 MG: 2 INJECTION INTRAMUSCULAR; INTRAVENOUS at 10:06

## 2017-10-14 RX ADMIN — ATRACURIUM BESYLATE 50 MG: 10 INJECTION, SOLUTION INTRAVENOUS at 08:33

## 2017-10-14 RX ADMIN — HYDROCODONE BITARTRATE AND ACETAMINOPHEN 1 TABLET: 7.5; 325 TABLET ORAL at 17:13

## 2017-10-14 RX ADMIN — EPHEDRINE SULFATE 5 MG: 50 INJECTION INTRAMUSCULAR; INTRAVENOUS; SUBCUTANEOUS at 08:40

## 2017-10-14 RX ADMIN — LEVOTHYROXINE SODIUM 75 MCG: 75 TABLET ORAL at 05:27

## 2017-10-14 RX ADMIN — DEXAMETHASONE SODIUM PHOSPHATE 4 MG: 4 INJECTION, SOLUTION INTRAMUSCULAR; INTRAVENOUS at 08:40

## 2017-10-14 RX ADMIN — EPHEDRINE SULFATE 5 MG: 50 INJECTION INTRAMUSCULAR; INTRAVENOUS; SUBCUTANEOUS at 09:00

## 2017-10-14 RX ADMIN — SODIUM CHLORIDE, POTASSIUM CHLORIDE, SODIUM LACTATE AND CALCIUM CHLORIDE: 600; 310; 30; 20 INJECTION, SOLUTION INTRAVENOUS at 08:20

## 2017-10-14 RX ADMIN — INSULIN LISPRO 3 UNITS: 100 INJECTION, SOLUTION INTRAVENOUS; SUBCUTANEOUS at 20:57

## 2017-10-14 RX ADMIN — ACETAMINOPHEN 1000 MG: 500 TABLET ORAL at 20:56

## 2017-10-14 NOTE — PLAN OF CARE
Problem: Patient Care Overview (Adult)  Goal: Plan of Care Review  Outcome: Ongoing (interventions implemented as appropriate)    10/14/17 1543   Coping/Psychosocial Response Interventions   Plan Of Care Reviewed With patient   Patient Care Overview   Progress progress toward functional goals as expected       Goal: Adult Individualization and Mutuality  Outcome: Outcome(s) achieved Date Met:  10/14/17    10/12/17 0742   Individualization   Patient Specific Interventions has 2 prosthetics, ACHS accuchecks       Goal: Discharge Needs Assessment  Outcome: Ongoing (interventions implemented as appropriate)    10/12/17 0742 10/13/17 0945 10/14/17 1543   Discharge Needs Assessment   Concerns To Be Addressed --  no discharge needs identified;denies needs/concerns at this time --    Readmission Within The Last 30 Days --  no previous admission in last 30 days --    Equipment Needed After Discharge --  commode --    Current Discharge Risk --  --  chronically ill;physical impairment   Discharge Disposition still a patient --  --    Current Health   Anticipated Changes Related to Illness --  --  none   Living Environment   Transportation Available --  car;family or friend will provide --    Self-Care   Equipment Currently Used at Home --  prosthesis;walker, standard;wheelchair --          Problem: Cardiac Catheterization with/without PCI (Adult)  Goal: Signs and Symptoms of Listed Potential Problems Will be Absent or Manageable (Cardiac Catheterization with/without PCI)  Outcome: Outcome(s) achieved Date Met:  10/14/17    10/14/17 1543   Cardiac Catheterization with/without PCI   Problems Assessed (Cardiac Catheterization) all   Problems Present (Cardiac Catheterization) none         Problem: Health Knowledge, Opportunity for Enhanced (Adult,NICU,,Obstetrics,Pediatric)  Goal: Identify Related Risk Factors and Signs and Symptoms  Outcome: Outcome(s) achieved Date Met:  10/14/17    10/14/17 1543   Health Knowledge,  Opportunity for Enhanced   Health Knowledge, Opportunity for Enhanced: Related Risk Factors fatigue;pain;prognosis/treatment plan   Signs and Symptoms (Health Knowledge Enhance) lack of adherence to prescribed health behavior;knowledge/skill deficiency       Goal: Knowledgeable about Health Subject/Topic  Outcome: Ongoing (interventions implemented as appropriate)    10/14/17 1543   Health Knowledge, Opportunity for Enhanced (Adult,NICU,,Obstetrics,Pediatric)   Knowledgeable about Health Subject/Topic making progress toward outcome         Problem: Activity Intolerance (Adult)  Goal: Identify Related Risk Factors and Signs and Symptoms  Outcome: Outcome(s) achieved Date Met:  10/14/17    10/14/17 1543   Activity Intolerance   Activity Intolerance: Related Risk Factors deconditioned state;generalized weakness;impaired cardiac output;obesity;pain   Signs and Symptoms (Activity Intolerance) pain/discomfort       Goal: Activity Tolerance  Outcome: Ongoing (interventions implemented as appropriate)    10/14/17 1543   Activity Intolerance (Adult)   Activity Tolerance making progress toward outcome       Goal: Effective Energy Conservation Techniques  Outcome: Ongoing (interventions implemented as appropriate)    10/14/17 1543   Activity Intolerance (Adult)   Effective Energy Conservation Techniques making progress toward outcome

## 2017-10-14 NOTE — ANESTHESIA PREPROCEDURE EVALUATION
Anesthesia Evaluation     Patient summary reviewed and Nursing notes reviewed          Airway   Mallampati: III  TM distance: >3 FB  Neck ROM: full  possible difficult intubation  Dental - normal exam     Pulmonary - normal exam   (+) a smoker Former, COPD moderate, sleep apnea,   Cardiovascular - normal exam    (+) hypertension, valvular problems/murmurs (mild AS) AS, CAD, PVD,     ROS comment: Normal EF, moderate CAD    Neuro/Psych- negative ROS  GI/Hepatic/Renal/Endo    (+) morbid obesity, diabetes mellitus type 2 using insulin, hypothyroidism,     Musculoskeletal (-) negative ROS    Abdominal  - normal exam    Bowel sounds: normal.   Substance History - negative use     OB/GYN negative ob/gyn ROS         Other      history of cancer (colon)      Other Comment: Anemia - HCT=28                                  Anesthesia Plan    ASA 3 - emergent     general   (TAP blocks)  intravenous induction   Anesthetic plan and risks discussed with patient.

## 2017-10-14 NOTE — ANESTHESIA PROCEDURE NOTES
Airway  Urgency: elective    Airway not difficult    General Information and Staff    Patient location during procedure: OR  Anesthesiologist: TAMMY BARAJAS    Indications and Patient Condition  Indications for airway management: airway protection    Preoxygenated: yes  MILS not maintained throughout  Mask difficulty assessment: 1 - vent by mask    Final Airway Details  Final airway type: endotracheal airway      Successful airway: ETT  Cuffed: yes   Successful intubation technique: direct laryngoscopy and video laryngoscopy  Endotracheal tube insertion site: oral  Blade: Amara  Blade size: #3  ETT size: 7.5 mm  Placement verified by: chest auscultation and capnometry   Measured from: lips  ETT to lips (cm): 20  Number of attempts at approach: 1    Additional Comments  Negative epigastric sounds, Breath sound equal bilaterally with symmetric chest rise and fall

## 2017-10-14 NOTE — ANESTHESIA POSTPROCEDURE EVALUATION
Patient: Jonah Hannon    Procedure Summary     Date Anesthesia Start Anesthesia Stop Room / Location    10/14/17 0820 1017  [facility] / [facility]       Procedure Diagnosis Surgeon Provider    GAUDENCIO COLECTOMY AND UMBILICAL HERNIA REPAIR (N/A Abdomen) No diagnosis on file. MD Santiago Llamas MD          Anesthesia Type: general  Last vitals  BP        Temp        Pulse       Resp        SpO2          Post Anesthesia Care and Evaluation    Patient location during evaluation: PACU  Patient participation: complete - patient participated  Level of consciousness: awake and alert  Pain score: 0  Pain management: adequate  Airway patency: patent  Anesthetic complications: No anesthetic complications  PONV Status: none  Cardiovascular status: hemodynamically stable and acceptable  Respiratory status: nonlabored ventilation, acceptable and nasal cannula  Hydration status: acceptable

## 2017-10-14 NOTE — OP NOTE
Colon and Rectal [CSGA]    COLECTOMY GAUDENCIO  Procedure Note    Jonah Hannon  10/11/2017 - 10/14/2017    Pre-op Diagnosis:   Right colon cancer    Post-op Diagnosis:     Right colon cancer and umbilical hernia    Procedure(s):  Right -GAUDENCIO COLECTOMY AND UMBILICAL HERNIA REPAIR    Surgeon(s):  Keith Worthy MD    Anesthesia: General with Block    Staff:   Circulator: Shari Leonard RN  Scrub Person: Reid Ravi; Luz Recio  Assistant: JOHANA Casillas  Other: Nawaf Hogue    Estimated Blood Loss: 50 mL    Specimens:                  Order Name Source Comment Collection Info Order Time   TISSUE PATHOLOGY EXAM Large Intestine, Right / Ascending Colon  Collected By: Keith Worthy MD 10/14/2017  9:57 AM         Drains:   Urethral Catheter 10/14/17 0900 100% silicone 16 10 10 (Active)           Findings: Morbidly obese 77-year-old white male with bleeding was found to have a right colon cancer on colonoscopy and CT in [place].  Because of his high risk, he was brought here for his operation.  Preop cardiac catheter showed mild stenosis of the LAD. The patient was taken to the operating room and placed under general anesthesia and positioned supine with a Chavez catheter.  Anesthesia did a subcostal tap block.  IV access was reestablished.  His abdomen was prepped and draped appropriately.  Intertriginous dermatitis was noted under his panniculus.  This was cleaned well.  After a timeout, a upper vertical midline incision was made just above the umbilicus.  Because there was a hernia present, I went ahead and made an incision below the umbilicus and freed it up from the fascia and opened it up.  Examination showed a normal liver and gallbladder.  There is some scarring around the stomach but nothing obvious.  No real hiatus was noted.  The small bowel was run and was normal.  The tumor was in the right colon about 10 cm above the cecum.    Technique: The right colon was mobilized along the line of  Toldt.  The omentum was taken off the hepatic flexure which was also taken down.  Terminal ileum was mobilized and then the whole right colon was brought up into the wound.  The right colic was isolated and divided and ligated.  Vessels are taken towards the terminal ileum and the proximal transverse colon area antimesenteric enterotomy was made in each and a 75 green linear stapler was used to make a side to side anastomosis.  Reapplication of the stapler perpendicular to the original firing was used to complete the resection and close enterotomy.  The specimen was later opened and the back table showing about a 3 cm probable T2 lesion.  No obvious lymphadenopathy.  I went ahead and buried the entire suture line with 3-0 Prolene.  Irrigation was carried out and the anastomosis placed back in the right lower abdomen.  The omentum was put over it.  The small bowel was run once again.  The fascia was closed in the left single layer with #1 PDS taking care to close the hernia.  The skin was closed with staples.  A Telfa dressing was applied.  The patient tolerated the procedure well and was taken to recovery room in satisfactory condition.    Complications: 0    Keith Worthy MD     Date: 10/14/2017  Time: 10:26 AM

## 2017-10-14 NOTE — ANESTHESIA PROCEDURE NOTES
Peripheral Block    Patient location during procedure: OR  Reason for block: at surgeon's request and post-op pain management  Performed by  Anesthesiologist: TAMMY BARAJAS  Preanesthetic Checklist  Completed: patient identified, site marked, surgical consent, pre-op evaluation, timeout performed, IV checked, risks and benefits discussed and monitors and equipment checked  Prep:  Pt Position: supine  Sterile barriers:cap, gloves, sterile barriers and mask  Prep: ChloraPrep  Patient monitoring: blood pressure monitoring, continuous pulse oximetry and EKG  Procedure  Sedation:yes  Performed under: general  Guidance:ultrasound guided  Images:still images obtained    Laterality:Bilateral  Block Type:TAP  Injection Technique:single-shot  Needle Type:short-bevel and echogenic  Needle Gauge:20 G    Medications  Comment:Block Injection:  LA dose divided between Right and Left block       Adjuncts:  Decadron 4mg PSF, Buprenex 0.3mg (Per total volume of LA)  Local Injected:bupivacaine 0.25% Local Amount Injected:60mL  Post Assessment  Injection Assessment: negative aspiration for heme, incremental injection and no paresthesia on injection  Patient Tolerance:comfortable throughout block  Complications:no  Additional Notes      Under Ultrasound guidance, a BBraun 4inch 360 degree needle was advanced with Normal Saline hydro dissection of tissue.  The Internal Oblique and Transversus Abdominus muscles where visualized.  At or before the aponeurosis of Internal Oblique, local anesthetic spread was visualized in the Transversus Abdominus Plane. Injection was made incrementally with aspiration every 5 mls.  There was no  intravascular injection,  injection pressure was normal, there was no neural injection, and the procedure was completed without difficulty.  Thank You.

## 2017-10-14 NOTE — PROGRESS NOTES
[facility]  INPATIENT PROGRESS NOTE    Date of Admission: 10/11/2017  Length of Stay: 3  Primary Care Physician: Wiliam Chu MD    Subjective     Chief Complaint: abd pain    HPI:  Patient seen in the PACU after his right colectomy and only complaining of abdominal pain.     Review Of Systems:   Review of Systems  Patient denies headaches, fever, chills, shortness of breath, chest pain, cough, nausea or vomiting,itching or bleeding      Objective      Temp:  [97.4 °F (36.3 °C)-98.7 °F (37.1 °C)] 97.9 °F (36.6 °C)  Heart Rate:  [46-96] 46  Resp:  [16-18] 16  BP: (113-168)/(53-85) 134/84  Physical Exam  Patient is drowsy but talkative uncomfortable  Neck is without mass or JVD  Heart is Reg wo murmur  Lungs are clear wo wheeze or crackle  Abd is soft quiet, incision covered  MAEW- RBKA  Skin is without rash  Neurologic exam is nonfocal   Mood is appropriate      Results Review:    I have reviewed the labs, radiology results and diagnostic studies.      Results from last 7 days  Lab Units 10/12/17  0437   WBC 10*3/mm3 7.01   HEMOGLOBIN g/dL 9.0*   PLATELETS 10*3/mm3 234       Results from last 7 days  Lab Units 10/12/17  0437   SODIUM mmol/L 135   POTASSIUM mmol/L 4.1   CO2 mmol/L 26.0   CREATININE mg/dL 1.10   GLUCOSE mg/dL 150*       Culture Data:   Wound Culture   Date Value Ref Range Status   10/11/2017 Light growth (2+) Proteus mirabilis (A)  Preliminary   10/11/2017 Light growth (2+) Enterococcus species (A)  Preliminary     Radiology Data:     I have reviewed the medications.    Assessment/Plan     Assessment/Problem List    Hospital Problem List     * (Principal)S/P right hemicolectomy    Colon cancer    Iron deficiency anemia due to chronic blood loss    Acquired hypothyroidism    Type 2 diabetes mellitus with circulatory disorder (Chronic)    Essential hypertension (Chronic)    MARISOL (obstructive sleep apnea)    Sacral wound    CAD in native artery-noncritical by  [facility] 10/12/17    Overview Addendum 10/14/2017 11:37 AM by Lady Lopez MD     [facility] 10/12/17Coronary dominance, right::: LM:   Normal LAD:  Scattered plaque mid vessel up to 30%, LCX:  Eccentric mid vessel plaque of 60%,   RCA:  Scattered plaque of 20%             Plan  76 yo transferred for cardiac evaluation for abn stress, [facility] showed noncritical CAD, s/p right hemicolectomy 10/14 with Dr Worthy apparently a T2 tumor.  Doing well post op  Will monitor HR and glucose and other needs as they arise.      DVT prophylaxis:heparin  Discharge Planning: I expect patient to be discharged to home 2-3 days  Lady Lopez MD 10/14/17 11:32 AM

## 2017-10-14 NOTE — PLAN OF CARE
Problem: Health Knowledge, Opportunity for Enhanced (Adult,NICU,,Obstetrics,Pediatric)  Goal: Knowledgeable about Health Subject/Topic  Outcome: Ongoing (interventions implemented as appropriate)    Problem: Activity Intolerance (Adult)  Goal: Activity Tolerance  Outcome: Ongoing (interventions implemented as appropriate)

## 2017-10-15 LAB
ANION GAP SERPL CALCULATED.3IONS-SCNC: 4 MMOL/L (ref 3–11)
BACTERIA SPEC AEROBE CULT: ABNORMAL
BASOPHILS # BLD AUTO: 0 10*3/MM3 (ref 0–0.2)
BASOPHILS NFR BLD AUTO: 0 % (ref 0–1)
BUN BLD-MCNC: 15 MG/DL (ref 9–23)
BUN/CREAT SERPL: 15 (ref 7–25)
CALCIUM SPEC-SCNC: 8.7 MG/DL (ref 8.7–10.4)
CHLORIDE SERPL-SCNC: 105 MMOL/L (ref 99–109)
CO2 SERPL-SCNC: 27 MMOL/L (ref 20–31)
CREAT BLD-MCNC: 1 MG/DL (ref 0.6–1.3)
DEPRECATED RDW RBC AUTO: 52 FL (ref 37–54)
EOSINOPHIL # BLD AUTO: 0 10*3/MM3 (ref 0–0.3)
EOSINOPHIL NFR BLD AUTO: 0 % (ref 0–3)
ERYTHROCYTE [DISTWIDTH] IN BLOOD BY AUTOMATED COUNT: 15.5 % (ref 11.3–14.5)
GFR SERPL CREATININE-BSD FRML MDRD: 72 ML/MIN/1.73
GLUCOSE BLD-MCNC: 182 MG/DL (ref 70–100)
GLUCOSE BLDC GLUCOMTR-MCNC: 141 MG/DL (ref 70–130)
GLUCOSE BLDC GLUCOMTR-MCNC: 172 MG/DL (ref 70–130)
GLUCOSE BLDC GLUCOMTR-MCNC: 184 MG/DL (ref 70–130)
GLUCOSE BLDC GLUCOMTR-MCNC: 96 MG/DL (ref 70–130)
GRAM STN SPEC: ABNORMAL
HCT VFR BLD AUTO: 28.5 % (ref 38.9–50.9)
HGB BLD-MCNC: 8.6 G/DL (ref 13.1–17.5)
IMM GRANULOCYTES # BLD: 0.03 10*3/MM3 (ref 0–0.03)
IMM GRANULOCYTES NFR BLD: 0.3 % (ref 0–0.6)
LYMPHOCYTES # BLD AUTO: 0.5 10*3/MM3 (ref 0.6–4.8)
LYMPHOCYTES NFR BLD AUTO: 4.2 % (ref 24–44)
MCH RBC QN AUTO: 27.9 PG (ref 27–31)
MCHC RBC AUTO-ENTMCNC: 30.2 G/DL (ref 32–36)
MCV RBC AUTO: 92.5 FL (ref 80–99)
MONOCYTES # BLD AUTO: 1.24 10*3/MM3 (ref 0–1)
MONOCYTES NFR BLD AUTO: 10.5 % (ref 0–12)
NEUTROPHILS # BLD AUTO: 10.02 10*3/MM3 (ref 1.5–8.3)
NEUTROPHILS NFR BLD AUTO: 85 % (ref 41–71)
PLATELET # BLD AUTO: 241 10*3/MM3 (ref 150–450)
PMV BLD AUTO: 9.8 FL (ref 6–12)
POTASSIUM BLD-SCNC: 4.2 MMOL/L (ref 3.5–5.5)
RBC # BLD AUTO: 3.08 10*6/MM3 (ref 4.2–5.76)
SODIUM BLD-SCNC: 136 MMOL/L (ref 132–146)
STREP GROUPING: ABNORMAL
WBC NRBC COR # BLD: 11.79 10*3/MM3 (ref 3.5–10.8)

## 2017-10-15 PROCEDURE — 99233 SBSQ HOSP IP/OBS HIGH 50: CPT | Performed by: INTERNAL MEDICINE

## 2017-10-15 PROCEDURE — 94799 UNLISTED PULMONARY SVC/PX: CPT

## 2017-10-15 PROCEDURE — 94660 CPAP INITIATION&MGMT: CPT

## 2017-10-15 PROCEDURE — 25010000002 HEPARIN (PORCINE) PER 1000 UNITS: Performed by: COLON & RECTAL SURGERY

## 2017-10-15 PROCEDURE — 80048 BASIC METABOLIC PNL TOTAL CA: CPT | Performed by: COLON & RECTAL SURGERY

## 2017-10-15 PROCEDURE — 82962 GLUCOSE BLOOD TEST: CPT

## 2017-10-15 PROCEDURE — 85025 COMPLETE CBC W/AUTO DIFF WBC: CPT | Performed by: COLON & RECTAL SURGERY

## 2017-10-15 PROCEDURE — 63710000001 INSULIN LISPRO (HUMAN) PER 5 UNITS: Performed by: INTERNAL MEDICINE

## 2017-10-15 RX ORDER — NICOTINE POLACRILEX 4 MG
15 LOZENGE BUCCAL
Status: DISCONTINUED | OUTPATIENT
Start: 2017-10-15 | End: 2017-10-20 | Stop reason: HOSPADM

## 2017-10-15 RX ORDER — DEXTROSE MONOHYDRATE 25 G/50ML
25 INJECTION, SOLUTION INTRAVENOUS
Status: DISCONTINUED | OUTPATIENT
Start: 2017-10-15 | End: 2017-10-20 | Stop reason: HOSPADM

## 2017-10-15 RX ADMIN — INSULIN LISPRO 2 UNITS: 100 INJECTION, SOLUTION INTRAVENOUS; SUBCUTANEOUS at 08:28

## 2017-10-15 RX ADMIN — SODIUM CHLORIDE 100 ML/HR: 9 INJECTION, SOLUTION INTRAVENOUS at 08:29

## 2017-10-15 RX ADMIN — GABAPENTIN 600 MG: 300 CAPSULE ORAL at 08:29

## 2017-10-15 RX ADMIN — ACETAMINOPHEN 1000 MG: 500 TABLET ORAL at 16:02

## 2017-10-15 RX ADMIN — HEPARIN SODIUM 5000 UNITS: 5000 INJECTION, SOLUTION INTRAVENOUS; SUBCUTANEOUS at 05:17

## 2017-10-15 RX ADMIN — HEPARIN SODIUM 5000 UNITS: 5000 INJECTION, SOLUTION INTRAVENOUS; SUBCUTANEOUS at 20:51

## 2017-10-15 RX ADMIN — GABAPENTIN 600 MG: 300 CAPSULE ORAL at 20:51

## 2017-10-15 RX ADMIN — ACETAMINOPHEN 1000 MG: 500 TABLET ORAL at 20:51

## 2017-10-15 RX ADMIN — ALVIMOPAN 12 MG: 12 CAPSULE ORAL at 08:29

## 2017-10-15 RX ADMIN — LEVOTHYROXINE SODIUM 75 MCG: 75 TABLET ORAL at 05:18

## 2017-10-15 RX ADMIN — VALSARTAN 160 MG: 160 TABLET, FILM COATED ORAL at 08:29

## 2017-10-15 RX ADMIN — METOPROLOL TARTRATE 12.5 MG: 25 TABLET, FILM COATED ORAL at 08:29

## 2017-10-15 RX ADMIN — ALVIMOPAN 12 MG: 12 CAPSULE ORAL at 17:41

## 2017-10-15 RX ADMIN — ACETAMINOPHEN 1000 MG: 500 TABLET ORAL at 08:29

## 2017-10-15 RX ADMIN — INSULIN LISPRO 2 UNITS: 100 INJECTION, SOLUTION INTRAVENOUS; SUBCUTANEOUS at 17:43

## 2017-10-15 RX ADMIN — HEPARIN SODIUM 5000 UNITS: 5000 INJECTION, SOLUTION INTRAVENOUS; SUBCUTANEOUS at 14:12

## 2017-10-15 NOTE — PROGRESS NOTES
[facility] Medicine [facility]  INPATIENT PROGRESS NOTE    Date of Admission: 10/11/2017  Length of Stay: 4  Primary Care Physician: Wiliam Chu MD    Subjective     Chief Complaint: abd pain    HPI:  Patient doing OK, no nausea or vomiting, minimum amount of pain.  Doesn't consider clear liquid diet food.    Review Of Systems:   Review of Systems  Patient denies headaches, fever, chills, shortness of breath, chest pain, cough, nausea or vomiting,itching or bleeding      Objective      Temp:  [96.9 °F (36.1 °C)-98 °F (36.7 °C)] 96.9 °F (36.1 °C)  Heart Rate:  [55-79] 55  Resp:  [16-18] 18  BP: (107-161)/(55-79) 107/55  Physical Exam  Patient is awake but weak, [place]  Neck is without mass or JVD  Heart is Reg wo murmur  Lungs are clear wo wheeze or crackle  Abd is soft quiet, incision covered- min erythema to left of his bandage  MAEW- RBKA  Skin is without rash  Neurologic exam is nonfocal   Mood is appropriate      Results Review:    I have reviewed the labs, radiology results and diagnostic studies.      Results from last 7 days  Lab Units 10/15/17  0545   WBC 10*3/mm3 11.79*   HEMOGLOBIN g/dL 8.6*   PLATELETS 10*3/mm3 241       Results from last 7 days  Lab Units 10/15/17  0545   SODIUM mmol/L 136   POTASSIUM mmol/L 4.2   CO2 mmol/L 27.0   CREATININE mg/dL 1.00   GLUCOSE mg/dL 182*       Culture Data:   Wound Culture   Date Value Ref Range Status   10/11/2017 Light growth (2+) Proteus mirabilis (A)  Preliminary   10/11/2017 Light growth (2+) Enterococcus species (A)  Preliminary     Radiology Data:     I have reviewed the medications.    Assessment/Plan     Assessment/Problem List    Hospital Problem List     * (Principal)S/P right hemicolectomy    Colon cancer    Iron deficiency anemia due to chronic blood loss    Acquired hypothyroidism    Type 2 diabetes mellitus with circulatory disorder (Chronic)    Essential hypertension (Chronic)    MARISOL (obstructive sleep apnea)    Sacral wound     CAD in native artery-noncritical by [facility] 10/12/17    Overview Addendum 10/14/2017 11:37 AM by Lady Lopez MD     [facility] 10/12/17Coronary dominance, right::: LM:   Normal LAD:  Scattered plaque mid vessel up to 30%, LCX:  Eccentric mid vessel plaque of 60%,   RCA:  Scattered plaque of 20%             Plan  76 yo presented to outside hospital fir [facility] and was transferred for cardiac evaluation for abn stress, [facility] showed noncritical CAD.  He is now  s/p right hemicolectomy 10/14 with Dr Worthy apparently a T2 tumor.  Doing well post op  Watch HGB  Slight hyperglycemia-- will check FS, add insulin drop IVF when OK with Dr. Worthy  Will monitor HR and glucose and other needs as they arise.      DVT prophylaxis:heparin  Discharge Planning: I expect patient to be discharged to home 2-3 days  Lady Lopez MD 10/15/17 1:28 PM

## 2017-10-15 NOTE — PROGRESS NOTES
"Colon and Rectal [CSGA]    POD # 1    /67  Pulse 63  Temp 97.4 °F (36.3 °C) (Oral)   Resp 18  Ht 74\" (188 cm)  Wt (!) 301 lb 3.2 oz (137 kg)  SpO2 100%  BMI 38.67 kg/m2    Lab Results (last 24 hours)     Procedure Component Value Units Date/Time    Urinalysis With / Culture If Indicated - Urine, Clean Catch [[id number]]  (Abnormal) Collected:  10/14/17 1910    Specimen:  Urine from Urine, Catheter Updated:  10/14/17 1936     Color, UA Yellow     Appearance, UA Cloudy (A)     pH, UA 5.5     Specific Gravity, UA 1.020     Glucose, UA Negative     Ketones, UA Negative     Bilirubin, UA Negative     Blood, UA Negative     Protein, UA 30 mg/dL (1+) (A)     Leuk Esterase, UA Negative     Nitrite, UA Negative     Urobilinogen, UA 0.2 E.U./dL    Urinalysis, Microscopic Only - Urine, Clean Catch [[id number]]  (Abnormal) Collected:  10/14/17 1910    Specimen:  Urine from Urine, Catheter Updated:  10/14/17 1936     RBC, UA 0-2 /HPF      WBC, UA 3-5 (A) /HPF      Bacteria, UA None Seen /HPF      Squamous Epithelial Cells, UA 0-2 /HPF      Hyaline Casts, UA 7-12 /LPF      Methodology Automated Microscopy    POC Glucose Fingerstick [[id number]]  (Abnormal) Collected:  10/14/17 2038    Specimen:  Blood Updated:  10/14/17 2046     Glucose 200 (H) mg/dL     Narrative:       Meter: [id number] : [id number] Radha Marshall    CBC & Differential [[id number]] Collected:  10/15/17 0545    Specimen:  Blood Updated:  10/15/17 0645    Narrative:       The following orders were created for panel order CBC & Differential.  Procedure                               Abnormality         Status                     ---------                               -----------         ------                     CBC Auto Differential[[id number]]        Abnormal            Final result                 Please view results for these tests on the individual orders.    CBC Auto Differential [[id number]]  (Abnormal) Collected:  10/15/17 0545    Specimen:  " Blood Updated:  10/15/17 0645     WBC 11.79 (H) 10*3/mm3      RBC 3.08 (L) 10*6/mm3      Hemoglobin 8.6 (L) g/dL      Hematocrit 28.5 (L) %      MCV 92.5 fL      MCH 27.9 pg      MCHC 30.2 (L) g/dL      RDW 15.5 (H) %      RDW-SD 52.0 fl      MPV 9.8 fL      Platelets 241 10*3/mm3      Neutrophil % 85.0 (H) %      Lymphocyte % 4.2 (L) %      Monocyte % 10.5 %      Eosinophil % 0.0 %      Basophil % 0.0 %      Immature Grans % 0.3 %      Neutrophils, Absolute 10.02 (H) 10*3/mm3      Lymphocytes, Absolute 0.50 (L) 10*3/mm3      Monocytes, Absolute 1.24 (H) 10*3/mm3      Eosinophils, Absolute 0.00 10*3/mm3      Basophils, Absolute 0.00 10*3/mm3      Immature Grans, Absolute 0.03 10*3/mm3     Basic Metabolic Panel [[id number]]  (Abnormal) Collected:  10/15/17 0545    Specimen:  Blood Updated:  10/15/17 0652     Glucose 182 (H) mg/dL      BUN 15 mg/dL      Creatinine 1.00 mg/dL      Sodium 136 mmol/L      Potassium 4.2 mmol/L      Chloride 105 mmol/L      CO2 27.0 mmol/L      Calcium 8.7 mg/dL      eGFR Non African Amer 72 mL/min/1.73      BUN/Creatinine Ratio 15.0     Anion Gap 4.0 mmol/L     Narrative:       [employer] Guidelines    Stage     Description        GFR  1         Normal or High     90+  2         Mild decrease      60-89  3         Moderate decrease  30-59  4         Severe decrease    15-29  5         Kidney failure     <15    POC Glucose Fingerstick [[id number]]  (Abnormal) Collected:  10/15/17 0728    Specimen:  Blood Updated:  10/15/17 0740     Glucose 184 (H) mg/dL     Narrative:       Meter: [id number] : [id number] [place]    Urine Culture - Urine, Urine, Catheter [[id number]]  (Normal) Collected:  10/14/17 1909    Specimen:  Urine from Urine, Catheter Updated:  10/15/17 0805     Urine Culture No growth    POC Glucose Fingerstick [[id number]]  (Abnormal) Collected:  10/15/17 1133    Specimen:  Blood Updated:  10/15/17 1134     Glucose 141 (H) mg/dL     Narrative:       Meter:  [id number] : [id number] Rios Deepa    Wound Culture - Wound, Coccyx [[id number]]  (Abnormal)  (Susceptibility) Collected:  10/11/17 2203    Specimen:  Wound from Coccyx Updated:  10/15/17 1351     Wound Culture --      Light growth (2+) Proteus mirabilis (A)      Light growth (2+) Enterococcus faecalis (A)      Scant growth (1+) Streptococcus, Alpha Hemolytic, Not S. pneumoniae or Group D (A)      Light growth (2+) Streptococcus, Beta Hemolytic, Group C (A)      This organism is considered to be universally susceptible to penicillin.  No further antibiotic testing will be performed.        STREP GROUPING C     Gram Stain Result No WBCs seen      Few (2+) Gram negative bacilli      Occasional Gram positive cocci in pairs    Susceptibility      Proteus mirabilis     TAO     Ampicillin <=8 ug/ml Susceptible     Ampicillin + Sulbactam <=8/4 ug/ml Susceptible     Aztreonam >16 ug/ml Resistant     Cefepime <=8 ug/ml Susceptible     Cefotaxime <=2 ug/ml Susceptible     Ceftriaxone <=8 ug/ml Susceptible     Cefuroxime sodium <=4 ug/ml Susceptible     Ertapenem <=1 ug/ml Susceptible     Gentamicin 8 ug/ml Intermediate     Levofloxacin <=2 ug/ml Susceptible     Meropenem <=1 ug/ml Susceptible     Piperacillin + Tazobactam <=16 ug/ml Susceptible     Tetracycline >8 ug/ml Resistant     Tobramycin <=4 ug/ml Susceptible     Trimethoprim + Sulfamethoxazole <=2/38 ug/ml Susceptible                Susceptibility      Enterococcus faecalis     TAO     Ampicillin <=2 ug/ml Susceptible     Gentamicin High Level Synergy <=500 ug/ml Susceptible     Linezolid <=1 ug/ml Susceptible     Penicillin G 2 ug/ml Susceptible     Streptomycin High Level Synergy <=1000 ug/ml Susceptible     Vancomycin 2 ug/ml Susceptible                    POC Glucose Fingerstick [[id number]]  (Abnormal) Collected:  10/15/17 1645    Specimen:  Blood Updated:  10/15/17 1706     Glucose 172 (H) mg/dL     Narrative:       Meter: [id number] : [id number] Rios  Deepa          I/O this shift:  In: -   Out: 900 [Urine:900]    Alert and oriented.  No nausea or vomiting.  Good pain control  Good UO. Labs stable  No flatus or stool yet.  Stable post op course.  Pathology pending.    Order Name Source Comment Collection Info Order Time   TISSUE PATHOLOGY EXAM Large Intestine, Right / Ascending Colon  Collected By: Keith Worthy MD 10/14/2017  9:57 AM   .    Keith Worthy MD  10/15/17  5:41 PM

## 2017-10-15 NOTE — PLAN OF CARE
Problem: Patient Care Overview (Adult)  Goal: Plan of Care Review    10/12/17 0900 10/14/17 1543 10/14/17 2200   Coping/Psychosocial Response Interventions   Plan Of Care Reviewed With --  --  patient   Patient Care Overview   Progress --  progress toward functional goals as expected --    Outcome Evaluation   Outcome Summary/Follow up Plan [facility] nurse consulted to evaluate wound coccyx, which has been present for several years, apparently from old fistula repair. Wound irrigated with NS, packed with moist hydrofera then covered with hydrocolloid dressing. Plan to continue this treatment with change every 3 days. Recommend waffle cushion and pressure distribution when sitting. [facility] nurse will f/u. Please contact [facility] nurse with concerns. Thank you. --  --        Goal: Discharge Needs Assessment  Outcome: Ongoing (interventions implemented as appropriate)    10/12/17 0742 10/13/17 0945 10/14/17 1543   Discharge Needs Assessment   Concerns To Be Addressed --  no discharge needs identified;denies needs/concerns at this time --    Readmission Within The Last 30 Days --  no previous admission in last 30 days --    Equipment Needed After Discharge --  commode --    Current Discharge Risk --  --  chronically ill;physical impairment   Discharge Disposition still a patient --  --    Current Health   Anticipated Changes Related to Illness --  --  none   Living Environment   Transportation Available --  car;family or friend will provide --    Self-Care   Equipment Currently Used at Home --  prosthesis;walker, standard;wheelchair --          Problem: Health Knowledge, Opportunity for Enhanced (Adult,NICU,[place],Obstetrics,Pediatric)  Goal: Knowledgeable about Health Subject/Topic  Outcome: Ongoing (interventions implemented as appropriate)    10/15/17 7961   Health Knowledge, Opportunity for Enhanced (Adult,NICU,,Obstetrics,Pediatric)   Knowledgeable about Health Subject/Topic making progress toward outcome         Problem:  Activity Intolerance (Adult)  Goal: Activity Tolerance  Outcome: Ongoing (interventions implemented as appropriate)    10/15/17 0345   Activity Intolerance (Adult)   Activity Tolerance making progress toward outcome       Goal: Effective Energy Conservation Techniques  Outcome: Ongoing (interventions implemented as appropriate)    10/15/17 0345   Activity Intolerance (Adult)   Effective Energy Conservation Techniques making progress toward outcome

## 2017-10-16 ENCOUNTER — APPOINTMENT (OUTPATIENT)
Dept: GENERAL RADIOLOGY | Facility: HOSPITAL | Age: 77
End: 2017-10-16

## 2017-10-16 PROBLEM — J18.9 PNEUMONIA: Status: ACTIVE | Noted: 2017-10-16

## 2017-10-16 PROBLEM — J96.01 ACUTE RESPIRATORY FAILURE WITH HYPOXIA (HCC): Status: ACTIVE | Noted: 2017-10-16

## 2017-10-16 LAB
BACTERIA SPEC AEROBE CULT: NORMAL
BASOPHILS # BLD AUTO: 0.02 10*3/MM3 (ref 0–0.2)
BASOPHILS NFR BLD AUTO: 0.2 % (ref 0–1)
DEPRECATED RDW RBC AUTO: 54.4 FL (ref 37–54)
EOSINOPHIL # BLD AUTO: 0.22 10*3/MM3 (ref 0–0.3)
EOSINOPHIL NFR BLD AUTO: 1.7 % (ref 0–3)
ERYTHROCYTE [DISTWIDTH] IN BLOOD BY AUTOMATED COUNT: 15.7 % (ref 11.3–14.5)
GLUCOSE BLDC GLUCOMTR-MCNC: 111 MG/DL (ref 70–130)
GLUCOSE BLDC GLUCOMTR-MCNC: 119 MG/DL (ref 70–130)
GLUCOSE BLDC GLUCOMTR-MCNC: 140 MG/DL (ref 70–130)
GLUCOSE BLDC GLUCOMTR-MCNC: 162 MG/DL (ref 70–130)
HCT VFR BLD AUTO: 31 % (ref 38.9–50.9)
HGB BLD-MCNC: 9.8 G/DL (ref 13.1–17.5)
IMM GRANULOCYTES # BLD: 0.05 10*3/MM3 (ref 0–0.03)
IMM GRANULOCYTES NFR BLD: 0.4 % (ref 0–0.6)
LYMPHOCYTES # BLD AUTO: 0.71 10*3/MM3 (ref 0.6–4.8)
LYMPHOCYTES NFR BLD AUTO: 5.4 % (ref 24–44)
MCH RBC QN AUTO: 29.9 PG (ref 27–31)
MCHC RBC AUTO-ENTMCNC: 31.6 G/DL (ref 32–36)
MCV RBC AUTO: 94.5 FL (ref 80–99)
MONOCYTES # BLD AUTO: 1.36 10*3/MM3 (ref 0–1)
MONOCYTES NFR BLD AUTO: 10.4 % (ref 0–12)
NEUTROPHILS # BLD AUTO: 10.75 10*3/MM3 (ref 1.5–8.3)
NEUTROPHILS NFR BLD AUTO: 81.9 % (ref 41–71)
PLATELET # BLD AUTO: 253 10*3/MM3 (ref 150–450)
PMV BLD AUTO: 9.8 FL (ref 6–12)
RBC # BLD AUTO: 3.28 10*6/MM3 (ref 4.2–5.76)
WBC NRBC COR # BLD: 13.11 10*3/MM3 (ref 3.5–10.8)

## 2017-10-16 PROCEDURE — 71010 HC CHEST PA OR AP: CPT

## 2017-10-16 PROCEDURE — 85025 COMPLETE CBC W/AUTO DIFF WBC: CPT | Performed by: INTERNAL MEDICINE

## 2017-10-16 PROCEDURE — 82962 GLUCOSE BLOOD TEST: CPT

## 2017-10-16 PROCEDURE — 25010000002 HEPARIN (PORCINE) PER 1000 UNITS: Performed by: NURSE PRACTITIONER

## 2017-10-16 PROCEDURE — 87147 CULTURE TYPE IMMUNOLOGIC: CPT | Performed by: NURSE PRACTITIONER

## 2017-10-16 PROCEDURE — 92610 EVALUATE SWALLOWING FUNCTION: CPT

## 2017-10-16 PROCEDURE — 25010000002 HEPARIN (PORCINE) PER 1000 UNITS: Performed by: COLON & RECTAL SURGERY

## 2017-10-16 PROCEDURE — 94799 UNLISTED PULMONARY SVC/PX: CPT

## 2017-10-16 PROCEDURE — 99232 SBSQ HOSP IP/OBS MODERATE 35: CPT | Performed by: NURSE PRACTITIONER

## 2017-10-16 PROCEDURE — 25010000002 PIPERACILLIN SOD-TAZOBACTAM PER 1 G: Performed by: NURSE PRACTITIONER

## 2017-10-16 PROCEDURE — 94660 CPAP INITIATION&MGMT: CPT

## 2017-10-16 PROCEDURE — 87150 DNA/RNA AMPLIFIED PROBE: CPT | Performed by: NURSE PRACTITIONER

## 2017-10-16 PROCEDURE — 87040 BLOOD CULTURE FOR BACTERIA: CPT | Performed by: NURSE PRACTITIONER

## 2017-10-16 RX ORDER — HEPARIN SODIUM 5000 [USP'U]/ML
5000 INJECTION, SOLUTION INTRAVENOUS; SUBCUTANEOUS EVERY 12 HOURS SCHEDULED
Status: DISCONTINUED | OUTPATIENT
Start: 2017-10-16 | End: 2017-10-19

## 2017-10-16 RX ORDER — POTASSIUM CHLORIDE 7.45 MG/ML
10 INJECTION INTRAVENOUS
Status: DISCONTINUED | OUTPATIENT
Start: 2017-10-16 | End: 2017-10-16

## 2017-10-16 RX ORDER — POTASSIUM CHLORIDE 1.5 G/1.77G
40 POWDER, FOR SOLUTION ORAL AS NEEDED
Status: DISCONTINUED | OUTPATIENT
Start: 2017-10-16 | End: 2017-10-16

## 2017-10-16 RX ORDER — POTASSIUM CHLORIDE 750 MG/1
40 CAPSULE, EXTENDED RELEASE ORAL AS NEEDED
Status: DISCONTINUED | OUTPATIENT
Start: 2017-10-16 | End: 2017-10-16

## 2017-10-16 RX ADMIN — TAZOBACTAM SODIUM AND PIPERACILLIN SODIUM 4.5 G: 500; 4 INJECTION, SOLUTION INTRAVENOUS at 15:20

## 2017-10-16 RX ADMIN — ACETAMINOPHEN 1000 MG: 500 TABLET ORAL at 20:07

## 2017-10-16 RX ADMIN — SODIUM CHLORIDE 100 ML/HR: 9 INJECTION, SOLUTION INTRAVENOUS at 02:30

## 2017-10-16 RX ADMIN — DOXYCYCLINE 100 MG: 100 INJECTION, POWDER, LYOPHILIZED, FOR SOLUTION INTRAVENOUS at 15:21

## 2017-10-16 RX ADMIN — METOPROLOL TARTRATE 12.5 MG: 25 TABLET, FILM COATED ORAL at 08:24

## 2017-10-16 RX ADMIN — ALVIMOPAN 12 MG: 12 CAPSULE ORAL at 08:24

## 2017-10-16 RX ADMIN — LEVOTHYROXINE SODIUM 75 MCG: 75 TABLET ORAL at 05:26

## 2017-10-16 RX ADMIN — HEPARIN SODIUM 5000 UNITS: 5000 INJECTION, SOLUTION INTRAVENOUS; SUBCUTANEOUS at 05:25

## 2017-10-16 RX ADMIN — ALVIMOPAN 12 MG: 12 CAPSULE ORAL at 17:30

## 2017-10-16 RX ADMIN — ALUMINUM HYDROXIDE, MAGNESIUM HYDROXIDE, AND DIMETHICONE 30 ML: 400; 400; 40 SUSPENSION ORAL at 13:54

## 2017-10-16 RX ADMIN — ACETAMINOPHEN 1000 MG: 500 TABLET ORAL at 15:27

## 2017-10-16 RX ADMIN — METOPROLOL TARTRATE 12.5 MG: 25 TABLET, FILM COATED ORAL at 20:14

## 2017-10-16 RX ADMIN — ACETAMINOPHEN 1000 MG: 500 TABLET ORAL at 08:24

## 2017-10-16 RX ADMIN — GABAPENTIN 600 MG: 300 CAPSULE ORAL at 08:24

## 2017-10-16 RX ADMIN — HEPARIN SODIUM 5000 UNITS: 5000 INJECTION, SOLUTION INTRAVENOUS; SUBCUTANEOUS at 20:07

## 2017-10-16 RX ADMIN — TAZOBACTAM SODIUM AND PIPERACILLIN SODIUM 4.5 G: 500; 4 INJECTION, SOLUTION INTRAVENOUS at 22:00

## 2017-10-16 RX ADMIN — INSULIN LISPRO 2 UNITS: 100 INJECTION, SOLUTION INTRAVENOUS; SUBCUTANEOUS at 17:31

## 2017-10-16 RX ADMIN — GABAPENTIN 600 MG: 300 CAPSULE ORAL at 20:07

## 2017-10-16 RX ADMIN — VALSARTAN 160 MG: 160 TABLET, FILM COATED ORAL at 08:24

## 2017-10-16 NOTE — PROGRESS NOTES
"Colon and Rectal [CSGA]    POD # 2    /96 (BP Location: Left arm, Patient Position: Lying)  Pulse 71  Temp 96.4 °F (35.8 °C) (Axillary)   Resp 18  Ht 74\" (188 cm)  Wt 296 lb 11.2 oz (135 kg)  SpO2 100%  BMI 38.09 kg/m2    Lab Results (last 24 hours)     Procedure Component Value Units Date/Time    POC Glucose Fingerstick [[id number]]  (Abnormal) Collected:  10/15/17 1133    Specimen:  Blood Updated:  10/15/17 1134     Glucose 141 (H) mg/dL     Narrative:       Meter: [id number] : [id number] [place]    Wound Culture - Wound, Coccyx [[id number]]  (Abnormal)  (Susceptibility) Collected:  10/11/17 2203    Specimen:  Wound from Coccyx Updated:  10/15/17 1351     Wound Culture --      Light growth (2+) Proteus mirabilis (A)      Light growth (2+) Enterococcus faecalis (A)      Scant growth (1+) Streptococcus, Alpha Hemolytic, Not S. pneumoniae or Group D (A)      Light growth (2+) Streptococcus, Beta Hemolytic, Group C (A)      This organism is considered to be universally susceptible to penicillin.  No further antibiotic testing will be performed.        STREP GROUPING C     Gram Stain Result No WBCs seen      Few (2+) Gram negative bacilli      Occasional Gram positive cocci in pairs    Susceptibility      Proteus mirabilis     TAO     Ampicillin <=8 ug/ml Susceptible     Ampicillin + Sulbactam <=8/4 ug/ml Susceptible     Aztreonam >16 ug/ml Resistant     Cefepime <=8 ug/ml Susceptible     Cefotaxime <=2 ug/ml Susceptible     Ceftriaxone <=8 ug/ml Susceptible     Cefuroxime sodium <=4 ug/ml Susceptible     Ertapenem <=1 ug/ml Susceptible     Gentamicin 8 ug/ml Intermediate     Levofloxacin <=2 ug/ml Susceptible     Meropenem <=1 ug/ml Susceptible     Piperacillin + Tazobactam <=16 ug/ml Susceptible     Tetracycline >8 ug/ml Resistant     Tobramycin <=4 ug/ml Susceptible     Trimethoprim + Sulfamethoxazole <=2/38 ug/ml Susceptible                Susceptibility      Enterococcus faecalis     TAO     " Ampicillin <=2 ug/ml Susceptible     Gentamicin High Level Synergy <=500 ug/ml Susceptible     Linezolid <=1 ug/ml Susceptible     Penicillin G 2 ug/ml Susceptible     Streptomycin High Level Synergy <=1000 ug/ml Susceptible     Vancomycin 2 ug/ml Susceptible                    POC Glucose Fingerstick [[id number]]  (Abnormal) Collected:  10/15/17 1645    Specimen:  Blood Updated:  10/15/17 1706     Glucose 172 (H) mg/dL     Narrative:       Meter: [id number] : [id number] [place]    POC Glucose Fingerstick [[id number]]  (Normal) Collected:  10/15/17 2100    Specimen:  Blood Updated:  10/15/17 2111     Glucose 96 mg/dL     Narrative:       Meter: [id number] : [id number] Sanjay Norton    CBC & Differential [[id number]] Collected:  10/16/17 0506    Specimen:  Blood Updated:  10/16/17 0545    Narrative:       The following orders were created for panel order CBC & Differential.  Procedure                               Abnormality         Status                     ---------                               -----------         ------                     CBC Auto Differential[[id number]]        Abnormal            Final result                 Please view results for these tests on the individual orders.    CBC Auto Differential [[id number]]  (Abnormal) Collected:  10/16/17 0506    Specimen:  Blood Updated:  10/16/17 0545     WBC 13.11 (H) 10*3/mm3      RBC 3.28 (L) 10*6/mm3      Hemoglobin 9.8 (L) g/dL      Hematocrit 31.0 (L) %      MCV 94.5 fL      MCH 29.9 pg      MCHC 31.6 (L) g/dL      RDW 15.7 (H) %      RDW-SD 54.4 (H) fl      MPV 9.8 fL      Platelets 253 10*3/mm3      Neutrophil % 81.9 (H) %      Lymphocyte % 5.4 (L) %      Monocyte % 10.4 %      Eosinophil % 1.7 %      Basophil % 0.2 %      Immature Grans % 0.4 %      Neutrophils, Absolute 10.75 (H) 10*3/mm3      Lymphocytes, Absolute 0.71 10*3/mm3      Monocytes, Absolute 1.36 (H) 10*3/mm3      Eosinophils, Absolute 0.22 10*3/mm3      Basophils, Absolute  0.02 10*3/mm3      Immature Grans, Absolute 0.05 (H) 10*3/mm3     Tissue Pathology Exam - Tissue, Large Intestine, Right / Ascending Colon [[id number]] Collected:  10/14/17 0952    Specimen:  Tissue from Large Intestine, Right / Ascending Colon Updated:  10/16/17 0712    POC Glucose Fingerstick [[id number]]  (Normal) Collected:  10/16/17 0802    Specimen:  Blood Updated:  10/16/17 0805     Glucose 119 mg/dL     Narrative:       Meter: [id number] : [id number] Geoff LU    Urine Culture - Urine, Urine, Catheter [[id number]]  (Normal) Collected:  10/14/17 1909    Specimen:  Urine from Urine, Catheter Updated:  10/16/17 0851     Urine Culture No growth at 2 days          I/O this shift:  In: 360 [P.O.:360]  Out: -     Alert and oriented.  No nausea or vomiting.  Good pain control  Good UO. Labs stable  Bowel movement last night.  Abdomen soft and benign.  Main problem is pulmonary with thick sputum and heavy cough.  Order Name Source Comment Collection Info Order Time   TISSUE PATHOLOGY EXAM Large Intestine, Right / Ascending Colon  Collected By: Keith Worthy MD 10/14/2017  9:57 AM   .    Keith Worthy MD  10/16/17  10:18 AM

## 2017-10-16 NOTE — PLAN OF CARE
Problem: Patient Care Overview (Adult)  Goal: Plan of Care Review  Outcome: Ongoing (interventions implemented as appropriate)    10/16/17 1517   Coping/Psychosocial Response Interventions   Plan Of Care Reviewed With patient   Patient Care Overview   Progress (Initial Eval)   Outcome Evaluation   Outcome Summary/Follow up Plan Clinical bedside eval complete. Trials of thins via tsp, cup, and straw, pudding, and solid given. No overt s/s of aspiration. Pt w/ CXR concerning for possible aspiration pneumonia. REC: cont full liquid diet per MD, meds whole w/thin liquid, MBS tomorrow to r/o silent aspiration. RN to make pt NPO if any s/s w/diet before MBS.

## 2017-10-16 NOTE — PROGRESS NOTES
[facility] Medicine Services  INPATIENT PROGRESS NOTE    Date of Admission: 10/11/2017  Length of Stay: 5  Primary Care Physician: Wiliam Chu MD    Subjective     Chief Complaint: abd pain    HPI:  Complains of cough, lots of sputum, tan, thick, does not wear O2 at home, no soa    Review Of Systems:   Review of Systems    Gen: no fever or chills  Resp: as above  Cardio: No chest pain or palpitations  GI: + abdominal pain, No n/v/d      Objective      Temp:  [96.4 °F (35.8 °C)-98.1 °F (36.7 °C)] 96.4 °F (35.8 °C)  Heart Rate:  [58-71] 71  Resp:  [18] 18  BP: ()/(58-96) 146/96     Physical Exam  Patient is awake but weak, [place]  Heart is Reg wo murmur, sinus rhythm   CTA, 3LO2, frequent cough with tan blood tinged sputum seen in cup at bedside  abdominal incision covered- min erythema to left of his bandage, + bowel sounds  SORENSEN- RBKA  Skin is without rash  Neurologic exam is nonfocal, A+Ox3,   Mood is appropriate      Results Review:    I have reviewed the labs, radiology results and diagnostic studies.      Results from last 7 days  Lab Units 10/16/17  0506   WBC 10*3/mm3 13.11*   HEMOGLOBIN g/dL 9.8*   PLATELETS 10*3/mm3 253       Results from last 7 days  Lab Units 10/15/17  0545   SODIUM mmol/L 136   POTASSIUM mmol/L 4.2   CO2 mmol/L 27.0   CREATININE mg/dL 1.00   GLUCOSE mg/dL 182*       Culture Data:   Wound Culture   Date Value Ref Range Status   10/11/2017 Light growth (2+) Proteus mirabilis (A)  Preliminary   10/11/2017 Light growth (2+) Enterococcus species (A)  Preliminary     Radiology Data:     I have reviewed the medications.    Assessment/Plan     Assessment/Problem List    Hospital Problem List     * (Principal)S/P right hemicolectomy    Iron deficiency anemia due to chronic blood loss    Colon cancer    Acquired hypothyroidism    Type 2 diabetes mellitus with circulatory disorder (Chronic)    Essential hypertension (Chronic)    MARISOL (obstructive sleep apnea)     Sacral wound    CAD in native artery-noncritical by [facility] 10/12/17    Overview Addendum 10/14/2017 11:37 AM by Lady Lopez MD     [facility] 10/12/17Coronary dominance, right::: LM:   Normal LAD:  Scattered plaque mid vessel up to 30%, LCX:  Eccentric mid vessel plaque of 60%,   RCA:  Scattered plaque of 20%         Acute respiratory failure with hypoxia        Plan  76 yo presented to outside hospital for BRBPR. He underwent a CT scan and colonoscopy at OSH and was found to have colon cancer. He was transferred to [facility] under Dr. Gooden for cardiac evaluation for abn stress, [facility] showed noncritical CAD. Dr. Gooden asks Hospitalist to take over as attending on 10/13. Patient underwent right hemicolectomy on 10/14 with Dr Worthy for a T2 tumor.      PLAN:   --chest x-ray reviewed and consistent with pneumonia, concern for aspiration --> speech consult, sputum culture, urine studies, will treat with Zosyn and doxy for now  --decrease SQ Heparin to q 12 hours due to blood tinged sputum, no marylou hemoptysis noted, Hgb stable   --glucose controlled on ssi  --continue home meds   --IVF per Dr. Worthy     Case discussed with patient, nurse and Dr. Varma    DVT prophylaxis:heparin  Discharge Planning: I expect patient to be discharged to home 2-3 days  AUSTIN Camargo 10/16/17 1:19 PM     76 yo s/p hemicolectomy for colon cancer now with new leukocytosis and cough      acetaminophen 1,000 mg Oral TID   alvimopan 12 mg Oral BID   doxycycline 100 mg Intravenous Q12H   famotidine 20 mg Intravenous Once   gabapentin 600 mg Oral Q12H   heparin (porcine) 5,000 Units Subcutaneous Q12H   insulin lispro 0-7 Units Subcutaneous TID With Meals   levothyroxine 75 mcg Oral Q AM   metoprolol tartrate 12.5 mg Oral Q12H   piperacillin-tazobactam 4.5 g Intravenous Q8H   valsartan 160 mg Oral Q24H       Constitutional -no acute distress, non toxic, sitting up in chair  HEENT-NCAT, mucous membranes moist  CV-RRR, S1 S2 normal, no  m/r/g  Resp-Right lower lobe rhonchi  Abd-soft, non-tender to light palpatin, non-distended, normo active bowel sounds; obese  Ext-No lower extremity cyanosis, clubbing or edema bilaterally  Neuro-alert and oriented x 3, speech clear, moves all extremities   Psych-normal affect   Skin- No rash on exposed UE or LE bilaterally    Pneumonia, RLL  - RLL infiltrate on CXR  - begin empiric antibiotics  - send blood and sputum cultures  - speech evaluation given RLL location of infiltrate as it suggests aspiration  - repeat CXR and CBC am.    Discussed with EMILY Poe and CRS Dr Gore.    I have seen and examined patient, performing a face-to-face diagnostic evaluation with plan of care reviewed and developed with APRN/PA    . I have addended and modified the above history of present illness, physical examination, and assessment and plan to reflect my findings and impressions.     Drake Varma MD  10/16/17  7:03 PM

## 2017-10-16 NOTE — PROGRESS NOTES
Continued Stay Note   [facility]     Patient Name: Jonha Hannon  MRN: [id number]  Today's Date: 10/16/2017    Admit Date: 10/11/2017          Discharge Plan       10/16/17 1535    Case Management/Social Work Plan    Plan DC planning    Patient/Family In Agreement With Plan yes    Additional Comments I spoke with the pt. He states his friend will stay with him at DC. He states he is weak. May benefit from PT/OT evals in the hospital to assist with DC needs. He would like a bariatric BSC and possible toilet extention at [place].              Discharge Codes     None        Expected Discharge Date and Time     Expected Discharge Date Expected Discharge Time    Oct 18, 2017             Marisela Wilson RN

## 2017-10-16 NOTE — THERAPY EVALUATION
Acute Care - Speech Language Pathology   Swallow Initial Evaluation  [place]   Clinical Swallow Evaluation       Patient Name: Jonah Hannon  : 1940  MRN: [id number]  Today's Date: 10/16/2017               Admit Date: 10/11/2017    Visit Dx:     ICD-10-CM ICD-9-CM   1. Colon cancer C18.9 153.9     Patient Active Problem List   Diagnosis   • Iron deficiency anemia due to chronic blood loss   • Colon cancer   • Acquired hypothyroidism   • Type 2 diabetes mellitus with circulatory disorder   • Essential hypertension   • MARISOL (obstructive sleep apnea)   • Sacral wound   • S/P right hemicolectomy   • CAD in native artery-noncritical by [facility] 10/12/17   • Acute respiratory failure with hypoxia   • Pneumonia     Past Medical History:   Diagnosis Date   • CHF (congestive heart failure)    • CMT (Charcot-Collette-Tooth disease)    • Diabetes    • Hypertension      Past Surgical History:   Procedure Laterality Date   • BELOW KNEE LEG AMPUTATION     • CARDIAC CATHETERIZATION     • CARDIAC CATHETERIZATION N/A 10/12/2017    Procedure: Left Heart Cath;  Surgeon: Lloyd Gooden MD;  Location:  [facility] CATH INVASIVE [facility];  Service:    • COLON RESECTION N/A 10/14/2017    Procedure: GAUDENCIO COLECTOMY AND UMBILICAL HERNIA REPAIR;  Surgeon: Keith Worthy MD;  Location:  [facility] OR;  Service:    • CYST REMOVAL      pilonodal cyst on back removed.           SWALLOW EVALUATION (last 72 hours)      Swallow Evaluation       10/16/17 1445                Rehab Evaluation    Document Type (P)  evaluation  -RK        Subjective Information (P)  no complaints;agree to therapy  -RK        General Information    Patient Profile Review (P)  yes  -RK        Onset of Illness/Injury (P)  10/11/17  -RK        Subjective Patient Observations (P)  alert and cooperative   -RK        Pertinent History Of Current Problem (P)  Admit w/GI bleed, colon carcinoma. SOB and ?heart defecits. SLP to r/o aspiration  -RK        Current Diet Limitations (P)  " other (see comments)   Full Liquid diet  -RK        Precautions/Limitations, Vision (P)  WFL;other (see comments)   for eval  -RK        Precautions/Limitations, Hearing (P)  WFL;other (see comments)   for eval  -RK        Prior Level of Function- Communication (P)  functional in all spheres  -RK        Prior Level of Function- Swallowing (P)  no diet consistency restrictions  -RK        Plans/Goals Discussed With (P)  patient;agreed upon  -RK        Barriers to Rehab (P)  none identified  -RK        Clinical Impression    Patient's Goals For Discharge (P)  return to regular diet  -RK        SLP Swallowing Diagnosis (P)  other (see comments)   r/o silet aspiration  -RK        Rehab Potential/Prognosis, Swallowing (P)  good, to achieve stated therapy goals  -RK        Criteria for Skilled Therapeutic Interventions Met (P)  demonstrates skilled criteria for intervention  -RK        Therapy Frequency (P)  evaluation only  -RK        Predicted Duration Therapy Interv (days) (P)  until discharge  -RK        SLP Diet Recommendation (P)  other (see comments)   Full liquid diet per MD   -RK        Recommended Diagnostics (P)  VFSS (MBS)  -RK        SLP Rec. for Method of Medication Administration (P)  meds whole with thin liquid  -RK        Monitor For Signs Of Aspiration (P)  cough;gurgly voice;throat clearing  -RK        Pain Assessment    Pain Assessment (P)  0-10  -RK        Pain Score (P)  6  -RK        Post Pain Score (P)  6  -RK        Pain Type (P)  Acute pain  -RK        Pain Location (P)  Other (Comment)   \"all over\"  -RK        Pain Intervention(s) (P)  Repositioned  -RK        Response to Interventions (P)  Tolerated  -RK        Oral Motor Structure and Function    Oral Motor Anatomy and Physiology (P)  patient demonstrates anatomy and physiology that is WNL  -RK        Dentition Assessment (P)  present and adequate;missing teeth  -RK        Secretion Management (P)  WNL/WFL  -RK        Mucosal Quality (P)  " moist, healthy  -RK        Velar Elevation (P)  WFL (within functional limits)  -RK        Volitional Swallow (P)  no difficulties initiating volitional swallow  -RK        Volitional Cough (P)  no difficulties initiating volitional cough  -RK        Oral Musculature General Assessment (P)  WFL (within functional limits)  -RK        General Feeding/Swallowing Observations    Current Feeding Method (P)  oral feeding methods  -RK        Observations of Self Feeding Skills (P)  appropriate self feeding skills observed  -RK        Observations of Posture During Feeding (P)  upright at 90   -RK        Clinical Swallow Exam    Mode of Presentation (P)  fed by clinician;self fed;cup;spoon;straw  -RK        Oral Phase Results (P)  intact oral phase without signs of dysfunction  -RK        Pharyngeal Phase Results (P)  no signs/symptoms of pharyngeal impairment  -RK        Summary of Clinical Exam (P)  Clinical bedside eval complete. Trials of thins via tsp, cup, and straw, pudding, and solid. No overt s/s of aspiration. Pt w/ complex medical hx and CXR concerning for possible aspiration pnumonia. REC: cont full liquid diet per MD, meds whole w/thin liquid, MBS tomorrow to r/o silent aspiration.   -RK        Swallow Recommendations    Eating Assistance (P)  requires caregiver assistance for eating;requires caregiver to assist with positioning during eating  -RK        Oral Care (P)  oral care with toothbrush and dentifrice BID and PRN  -RK        Recommended Diet (P)  other (see comments)   Full liquid diet   -RK          User Key  (r) = Recorded By, (t) = Taken By, (c) = Cosigned By    Initials Name Effective Dates    KELLY Mon, Speech Therapy Student 08/24/17 -         EDUCATION  The patient has been educated in the following areas:   Dysphagia (Swallowing Impairment) Modified Diet Instruction.    SLP Recommendation and Plan  SLP Swallowing Diagnosis: (P) other (see comments) (r/o silet aspiration)  SLP Diet  Recommendation: (P) other (see comments) (Full liquid diet per MD )     SLP Rec. for Method of Medication Administration: (P) meds whole with thin liquid  Monitor For Signs Of Aspiration: (P) cough, gurgly voice, throat clearing  Recommended Diagnostics: (P) VFSS (MBS)  Criteria for Skilled Therapeutic Interventions Met: (P) demonstrates skilled criteria for intervention     Rehab Potential/Prognosis, Swallowing: (P) good, to achieve stated therapy goals  Therapy Frequency: (P) evaluation only             Plan of Care Review  Plan Of Care Reviewed With: (P) patient  Progress: (P)  (Initial Eval)  Outcome Summary/Follow up Plan: (P) Clinical bedside eval complete. Trials of thins via tsp, cup, and straw, pudding, and solid. No overt s/s of aspiration. Pt w/ complex medical hx and CXR concerning for possible aspiration pneumonia. REC: cont full liquid diet per MD, meds whole w/thin liquid, MBS tomorrow to r/o silent aspiration.             Time Calculation:         Time Calculation- SLP       10/16/17 1520          Time Calculation- SLP    SLP Start Time (P)  1445  -RK      SLP Received On (P)  10/16/17  -        User Key  (r) = Recorded By, (t) = Taken By, (c) = Cosigned By    Initials Name Provider Type    KELLY Mon Speech Therapy Student Speech Therapy Student          Therapy Charges for Today     Code Description Service Date Service Provider Modifiers Qty    12809876597 HC ST EVAL ORAL PHARYNG SWALLOW 3 10/16/2017 Olivia Mon Speech Therapy Student GN 1               Olivia Mon Speech Therapy Student  10/16/2017

## 2017-10-16 NOTE — PLAN OF CARE
Problem: Patient Care Overview (Adult)  Goal: Plan of Care Review  Outcome: Ongoing (interventions implemented as appropriate)    10/14/17 1543 10/15/17 2000   Coping/Psychosocial Response Interventions   Plan Of Care Reviewed With --  patient   Patient Care Overview   Progress progress toward functional goals as expected --          Problem: Health Knowledge, Opportunity for Enhanced (Adult,NICU,[place],Obstetrics,Pediatric)  Goal: Knowledgeable about Health Subject/Topic  Outcome: Ongoing (interventions implemented as appropriate)    10/16/17 0403   Health Knowledge, Opportunity for Enhanced (Adult,NICU,[place],Obstetrics,Pediatric)   Knowledgeable about Health Subject/Topic making progress toward outcome         Problem: Activity Intolerance (Adult)  Goal: Activity Tolerance  Outcome: Ongoing (interventions implemented as appropriate)    10/15/17 0345   Activity Intolerance (Adult)   Activity Tolerance making progress toward outcome       Goal: Effective Energy Conservation Techniques  Outcome: Ongoing (interventions implemented as appropriate)    10/16/17 0403   Activity Intolerance (Adult)   Effective Energy Conservation Techniques making progress toward outcome         Problem: Skin Integrity Impairment, Risk/Actual (Adult)  Goal: Identify Related Risk Factors and Signs and Symptoms  Outcome: Ongoing (interventions implemented as appropriate)    10/16/17 0403   Skin Integrity Impairment, Risk/Actual   Skin Integrity Impairment, Risk/Actual: Related Risk Factors age extremes;immobility;mechanical factors   Signs and Symptoms (Skin Integrity Impairment) other (see comments)  (Coccyx wound)       Goal: Skin Integrity/Wound Healing  Outcome: Ongoing (interventions implemented as appropriate)    10/16/17 0403   Skin Integrity Impairment, Risk/Actual (Adult)   Skin Integrity/Wound Healing making progress toward outcome         Problem: Pressure Ulcer Risk (Micha Scale) (Adult,Obstetrics,Pediatric)  Goal: Identify  Related Risk Factors and Signs and Symptoms  Outcome: Ongoing (interventions implemented as appropriate)

## 2017-10-17 ENCOUNTER — APPOINTMENT (OUTPATIENT)
Dept: GENERAL RADIOLOGY | Facility: HOSPITAL | Age: 77
End: 2017-10-17

## 2017-10-17 ENCOUNTER — APPOINTMENT (OUTPATIENT)
Dept: GENERAL RADIOLOGY | Facility: HOSPITAL | Age: 77
End: 2017-10-17
Attending: INTERNAL MEDICINE

## 2017-10-17 LAB
ANION GAP SERPL CALCULATED.3IONS-SCNC: 13 MMOL/L (ref 3–11)
BACTERIA BLD CULT: ABNORMAL
BASOPHILS # BLD AUTO: 0.02 10*3/MM3 (ref 0–0.2)
BASOPHILS NFR BLD AUTO: 0.2 % (ref 0–1)
BUN BLD-MCNC: 15 MG/DL (ref 9–23)
BUN/CREAT SERPL: 12.5 (ref 7–25)
CALCIUM SPEC-SCNC: 8.7 MG/DL (ref 8.7–10.4)
CHLORIDE SERPL-SCNC: 99 MMOL/L (ref 99–109)
CO2 SERPL-SCNC: 23 MMOL/L (ref 20–31)
CREAT BLD-MCNC: 1.2 MG/DL (ref 0.6–1.3)
CYTO UR: NORMAL
DEPRECATED RDW RBC AUTO: 52.5 FL (ref 37–54)
EOSINOPHIL # BLD AUTO: 0.03 10*3/MM3 (ref 0–0.3)
EOSINOPHIL NFR BLD AUTO: 0.3 % (ref 0–3)
ERYTHROCYTE [DISTWIDTH] IN BLOOD BY AUTOMATED COUNT: 15.7 % (ref 11.3–14.5)
GFR SERPL CREATININE-BSD FRML MDRD: 59 ML/MIN/1.73
GLUCOSE BLD-MCNC: 149 MG/DL (ref 70–100)
GLUCOSE BLDC GLUCOMTR-MCNC: 131 MG/DL (ref 70–130)
GLUCOSE BLDC GLUCOMTR-MCNC: 154 MG/DL (ref 70–130)
GLUCOSE BLDC GLUCOMTR-MCNC: 183 MG/DL (ref 70–130)
GLUCOSE BLDC GLUCOMTR-MCNC: 210 MG/DL (ref 70–130)
HCT VFR BLD AUTO: 28.4 % (ref 38.9–50.9)
HGB BLD-MCNC: 8.7 G/DL (ref 13.1–17.5)
IMM GRANULOCYTES # BLD: 0.01 10*3/MM3 (ref 0–0.03)
IMM GRANULOCYTES NFR BLD: 0.1 % (ref 0–0.6)
LAB AP CASE REPORT: NORMAL
LAB AP CLINICAL INFORMATION: NORMAL
LYMPHOCYTES # BLD AUTO: 0.91 10*3/MM3 (ref 0.6–4.8)
LYMPHOCYTES NFR BLD AUTO: 10.2 % (ref 24–44)
Lab: NORMAL
MCH RBC QN AUTO: 28.2 PG (ref 27–31)
MCHC RBC AUTO-ENTMCNC: 30.6 G/DL (ref 32–36)
MCV RBC AUTO: 92.2 FL (ref 80–99)
MONOCYTES # BLD AUTO: 0.89 10*3/MM3 (ref 0–1)
MONOCYTES NFR BLD AUTO: 10 % (ref 0–12)
NEUTROPHILS # BLD AUTO: 7.02 10*3/MM3 (ref 1.5–8.3)
NEUTROPHILS NFR BLD AUTO: 79.2 % (ref 41–71)
PATH REPORT.FINAL DX SPEC: NORMAL
PATH REPORT.GROSS SPEC: NORMAL
PLATELET # BLD AUTO: 234 10*3/MM3 (ref 150–450)
PMV BLD AUTO: 9.7 FL (ref 6–12)
POTASSIUM BLD-SCNC: 3.9 MMOL/L (ref 3.5–5.5)
RBC # BLD AUTO: 3.08 10*6/MM3 (ref 4.2–5.76)
SODIUM BLD-SCNC: 135 MMOL/L (ref 132–146)
WBC NRBC COR # BLD: 8.88 10*3/MM3 (ref 3.5–10.8)

## 2017-10-17 PROCEDURE — 74230 X-RAY XM SWLNG FUNCJ C+: CPT

## 2017-10-17 PROCEDURE — 87040 BLOOD CULTURE FOR BACTERIA: CPT | Performed by: INTERNAL MEDICINE

## 2017-10-17 PROCEDURE — 97166 OT EVAL MOD COMPLEX 45 MIN: CPT

## 2017-10-17 PROCEDURE — 99233 SBSQ HOSP IP/OBS HIGH 50: CPT | Performed by: INTERNAL MEDICINE

## 2017-10-17 PROCEDURE — 97162 PT EVAL MOD COMPLEX 30 MIN: CPT

## 2017-10-17 PROCEDURE — 71010 HC CHEST PA OR AP: CPT

## 2017-10-17 PROCEDURE — 25010000002 HEPARIN (PORCINE) PER 1000 UNITS: Performed by: NURSE PRACTITIONER

## 2017-10-17 PROCEDURE — 25010000002 VANCOMYCIN HCL IN NACL 2-0.9 GM/500ML-% SOLUTION

## 2017-10-17 PROCEDURE — 85025 COMPLETE CBC W/AUTO DIFF WBC: CPT | Performed by: NURSE PRACTITIONER

## 2017-10-17 PROCEDURE — 82962 GLUCOSE BLOOD TEST: CPT

## 2017-10-17 PROCEDURE — 80048 BASIC METABOLIC PNL TOTAL CA: CPT | Performed by: NURSE PRACTITIONER

## 2017-10-17 PROCEDURE — 25010000002 PIPERACILLIN SOD-TAZOBACTAM PER 1 G: Performed by: NURSE PRACTITIONER

## 2017-10-17 PROCEDURE — 92611 MOTION FLUOROSCOPY/SWALLOW: CPT

## 2017-10-17 RX ORDER — DOXYCYCLINE HYCLATE 100 MG/1
100 CAPSULE ORAL ONCE
Status: DISCONTINUED | OUTPATIENT
Start: 2017-10-17 | End: 2017-10-17

## 2017-10-17 RX ORDER — VANCOMYCIN 2 GRAM/500 ML IN 0.9 % SODIUM CHLORIDE INTRAVENOUS
2000 ONCE
Status: COMPLETED | OUTPATIENT
Start: 2017-10-17 | End: 2017-10-17

## 2017-10-17 RX ORDER — VANCOMYCIN/0.9 % SOD CHLORIDE 1.5G/250ML
1500 PLASTIC BAG, INJECTION (ML) INTRAVENOUS EVERY 24 HOURS
Status: DISCONTINUED | OUTPATIENT
Start: 2017-10-18 | End: 2017-10-19

## 2017-10-17 RX ORDER — DOXYCYCLINE HYCLATE 100 MG/1
100 CAPSULE ORAL EVERY 12 HOURS
Status: DISCONTINUED | OUTPATIENT
Start: 2017-10-18 | End: 2017-10-17

## 2017-10-17 RX ADMIN — VALSARTAN 160 MG: 160 TABLET, FILM COATED ORAL at 08:56

## 2017-10-17 RX ADMIN — TAZOBACTAM SODIUM AND PIPERACILLIN SODIUM 4.5 G: 500; 4 INJECTION, SOLUTION INTRAVENOUS at 05:05

## 2017-10-17 RX ADMIN — INSULIN LISPRO 2 UNITS: 100 INJECTION, SOLUTION INTRAVENOUS; SUBCUTANEOUS at 17:02

## 2017-10-17 RX ADMIN — HEPARIN SODIUM 5000 UNITS: 5000 INJECTION, SOLUTION INTRAVENOUS; SUBCUTANEOUS at 08:54

## 2017-10-17 RX ADMIN — INSULIN LISPRO 2 UNITS: 100 INJECTION, SOLUTION INTRAVENOUS; SUBCUTANEOUS at 08:54

## 2017-10-17 RX ADMIN — METOPROLOL TARTRATE 12.5 MG: 25 TABLET, FILM COATED ORAL at 08:55

## 2017-10-17 RX ADMIN — TAZOBACTAM SODIUM AND PIPERACILLIN SODIUM 4.5 G: 500; 4 INJECTION, SOLUTION INTRAVENOUS at 15:30

## 2017-10-17 RX ADMIN — DOXYCYCLINE 100 MG: 100 INJECTION, POWDER, LYOPHILIZED, FOR SOLUTION INTRAVENOUS at 02:34

## 2017-10-17 RX ADMIN — ACETAMINOPHEN 1000 MG: 500 TABLET ORAL at 08:55

## 2017-10-17 RX ADMIN — BARIUM SULFATE 40 ML: 0.81 POWDER, FOR SUSPENSION ORAL at 11:04

## 2017-10-17 RX ADMIN — ALVIMOPAN 12 MG: 12 CAPSULE ORAL at 08:55

## 2017-10-17 RX ADMIN — INSULIN LISPRO 3 UNITS: 100 INJECTION, SOLUTION INTRAVENOUS; SUBCUTANEOUS at 12:35

## 2017-10-17 RX ADMIN — ACETAMINOPHEN 1000 MG: 500 TABLET ORAL at 15:55

## 2017-10-17 RX ADMIN — HEPARIN SODIUM 5000 UNITS: 5000 INJECTION, SOLUTION INTRAVENOUS; SUBCUTANEOUS at 20:22

## 2017-10-17 RX ADMIN — TAZOBACTAM SODIUM AND PIPERACILLIN SODIUM 4.5 G: 500; 4 INJECTION, SOLUTION INTRAVENOUS at 21:07

## 2017-10-17 RX ADMIN — BARIUM SULFATE 20 ML: 400 PASTE ORAL at 11:03

## 2017-10-17 RX ADMIN — ACETAMINOPHEN 1000 MG: 500 TABLET ORAL at 20:22

## 2017-10-17 RX ADMIN — LEVOTHYROXINE SODIUM 75 MCG: 75 TABLET ORAL at 05:04

## 2017-10-17 RX ADMIN — BARIUM SULFATE 30 ML: 400 SUSPENSION ORAL at 11:04

## 2017-10-17 RX ADMIN — Medication 2000 MG: at 15:55

## 2017-10-17 NOTE — MBS/VFSS/FEES
Acute Care - Speech Language Pathology   Swallow Initial Evaluation [facility]   Modified Barium Swallow Study (MBS)       Patient Name: Jonah Hannon  : 1940  MRN: [id number]  Today's Date: 10/17/2017  Onset of Illness/Injury or Date of Surgery Date: 10/11/17            Admit Date: 10/11/2017    Visit Dx:     ICD-10-CM ICD-9-CM   1. Pharyngeal dysphagia R13.13 787.23   2. Colon cancer C18.9 153.9   3. Impaired mobility and ADLs Z74.09 799.89   4. Impaired functional mobility, balance, gait, and endurance Z74.09 V49.89     Patient Active Problem List   Diagnosis   • Iron deficiency anemia due to chronic blood loss   • Colon cancer   • Acquired hypothyroidism   • Type 2 diabetes mellitus with circulatory disorder   • Essential hypertension   • MARISOL (obstructive sleep apnea)   • Sacral wound   • S/P right hemicolectomy   • CAD in native artery-noncritical by [facility] 10/12/17   • Acute respiratory failure with hypoxia   • Pneumonia     Past Medical History:   Diagnosis Date   • CHF (congestive heart failure)    • CMT (Charcot-Collette-Tooth disease)    • Diabetes    • Hypertension      Past Surgical History:   Procedure Laterality Date   • BELOW KNEE LEG AMPUTATION     • CARDIAC CATHETERIZATION     • CARDIAC CATHETERIZATION N/A 10/12/2017    Procedure: Left Heart Cath;  Surgeon: Lloyd Gooden MD;  Location: [facility] CATH INVASIVE LOCATION;  Service:    • COLON RESECTION N/A 10/14/2017    Procedure: GAUDENCIO COLECTOMY AND UMBILICAL HERNIA REPAIR;  Surgeon: Keith Worthy MD;  Location: [facility] OR;  Service:    • CYST REMOVAL      pilonodal cyst on back removed.           SWALLOW EVALUATION (last 72 hours)      Swallow Evaluation       10/17/17 1100 10/16/17 1445             Rehab Evaluation    Document Type evaluation  -SG evaluation  -SG (r) RK (t) SG (c)       Subjective Information no complaints;agree to therapy  -SG no complaints;agree to therapy  -SG (r) RK (t) SG (c)       Patient Effort, Rehab Treatment good   -SG        Symptoms Noted During/After Treatment fatigue  -SG        General Information    Patient Profile Review yes  -SG yes  -SG (r) RK (t) SG (c)       Onset of Illness/Injury 10/11/17  -SG 10/11/17  -SG (r) RK (t) SG (c)       Subjective Patient Observations alert and cooperative  -SG alert and cooperative   -SG (r) RK (t) SG (c)       Pertinent History Of Current Problem see initial eval  -SG Admit w/GI bleed, colon carcinoma. SOB and ?heart defecits. SLP to r/o aspiration  -SG (r) RK (t) SG (c)       Current Diet Limitations other (see comments)   full liquid diet  -SG other (see comments)   Full Liquid diet  -SG (r) RK (t) SG (c)       Precautions/Limitations, Vision WFL  -SG WFL;other (see comments)   for eval  -SG (r) RK (t) SG (c)       Precautions/Limitations, Hearing WFL  -SG WFL;other (see comments)   for eval  -SG (r) RK (t) SG (c)       Prior Level of Function- Communication functional in all spheres  -SG functional in all spheres  -SG (r) RK (t) SG (c)       Prior Level of Function- Swallowing no diet consistency restrictions  -SG no diet consistency restrictions  -SG (r) RK (t) SG (c)       Plans/Goals Discussed With patient;agreed upon  -SG patient;agreed upon  -SG (r) RK (t) SG (c)       Barriers to Rehab none identified  -SG none identified  -SG (r) RK (t) SG (c)       Clinical Impression    Patient's Goals For Discharge return to regular diet  -SG return to regular diet  -SG (r) RK (t) SG (c)       SLP Swallowing Diagnosis moderate dysphagia;pharyngeal dysfunction  -SG other (see comments)   r/o silet aspiration  -SG (r) RK (t) SG (c)       Rehab Potential/Prognosis, Swallowing good, to achieve stated therapy goals  -SG good, to achieve stated therapy goals  -SG (r) RK (t) SG (c)       Criteria for Skilled Therapeutic Interventions Met skilled criteria for dysphagia intervention met  -SG demonstrates skilled criteria for intervention  -SG (r) RK (t) SG (c)       FCM, Swallowing 4-->Level 4   "-SG        Therapy Frequency 5 times/wk  -SG evaluation only  -SG (r) RK (t) SG (c)       Predicted Duration Therapy Interv (days) until discharge  -SG until discharge  -SG (r) RK (t) SG (c)       SLP Diet Recommendation IV - mechanical soft, no mixed consistencies;nectar/syrup-thick liquids   when MD approves diet upgrade  -SG other (see comments)   Full liquid diet per MD   -SG (r) RK (t) SG (c)       Recommended Diagnostics  VFSS (MBS)  -SG (r) RK (t) SG (c)       SLP Rec. for Method of Medication Administration meds whole in pudding/applesauce;meds crushed in pudding/applesauce  -SG meds whole with thin liquid  -SG (r) RK (t) SG (c)       Monitor For Signs Of Aspiration cough;gurgly voice;throat clearing  -SG cough;gurgly voice;throat clearing  -SG (r) RK (t) SG (c)       Pain Assessment    Pain Assessment Sky-Hernandez FACES  -SG 0-10  -SG (r) RK (t) SG (c)       Syk-Baker FACES Pain Rating 0  -SG        Pain Score 0  -SG 6  -SG (r) RK (t) SG (c)       Post Pain Score 0  -SG 6  -SG (r) RK (t) SG (c)       Pain Type  Acute pain  -SG (r) RK (t) SG (c)       Pain Location  Other (Comment)   \"all over\"  -SG (r) RK (t) SG (c)       Pain Intervention(s)  Repositioned  -SG (r) RK (t) SG (c)       Response to Interventions  Tolerated  -SG (r) RK (t) SG (c)       Oral Motor Structure and Function    Oral Motor Anatomy and Physiology  patient demonstrates anatomy and physiology that is WNL  -SG (r) RK (t) SG (c)       Dentition Assessment  present and adequate;missing teeth  -SG (r) RK (t) SG (c)       Secretion Management  WNL/WFL  -SG (r) RK (t) SG (c)       Mucosal Quality  moist, healthy  -SG (r) RK (t) SG (c)       Velar Elevation  WFL (within functional limits)  -SG (r) RK (t) SG (c)       Volitional Swallow  no difficulties initiating volitional swallow  -SG (r) RK (t) SG (c)       Volitional Cough  no difficulties initiating volitional cough  -SG (r) RK (t) SG (c)       Oral Musculature General Assessment  WFL " (within functional limits)  -SG (r) RK (t) SG (c)       General Feeding/Swallowing Observations    Current Feeding Method  oral feeding methods  -SG (r) RK (t) SG (c)       Observations of Self Feeding Skills  appropriate self feeding skills observed  -SG (r) RK (t) SG (c)       Observations of Posture During Feeding  upright at 90   -SG (r) RK (t) SG (c)       Clinical Swallow Exam    Mode of Presentation  fed by clinician;self fed;cup;spoon;straw  -SG (r) RK (t) SG (c)       Oral Phase Results  intact oral phase without signs of dysfunction  -SG (r) RK (t) SG (c)       Pharyngeal Phase Results  no signs/symptoms of pharyngeal impairment  -SG (r) RK (t) SG (c)       Summary of Clinical Exam  Clinical bedside eval complete. Trials of thins via tsp, cup, and straw, pudding, and solid. No overt s/s of aspiration. Pt w/ complex medical hx and CXR concerning for possible aspiration pnumonia. REC: cont full liquid diet per MD, meds whole w/thin liquid, MBS tomorrow to r/o silent aspiration.   -SG (r) RK (t) SG (c)       Videofluoroscopic Swallowing Exam    Risks/Benefits Reviewed risks/benefits explained;agreed to eval;patient  -SG        Positioning Needs for Swallow Exam upright at 90 for evaluation  -SG        Radiologic Views Used for Examination left lateral view  -SG        Motor Function During Phonation/Speech    Observation Anatomic Considerations no anatomic structural deviation via videofluroscopy  -SG        VFSS    Mode of Presentation thin:;nectar:;pudding:;cohesive solid:;cup;spoon;straw  -SG        Pharyngeal Phase Physiologic Impairment impaired timing with penetration before;aspiration after from residue  -SG        Post Swallow Residue clears residue valleculae with cue  -SG        Rosenbek's PenAsp Scale 8-->Level 8  -SG        Response to Aspiration absent response, silent aspiration  -SG        Pharyngeal Phase Results impaired pharyngeal phase of swallowing  -SG        Summary of VFSS MBS completed  this date. Pt presents w/ moderate pharyngeal dysphagia. Oral phase grossly functional. At first, no asp w/ tsp of thins and small cup of thins. Aspiration soon occured however. Deep penetration before the swallow w/ thins via cup and straw 2' delay and dec'd closure at airway entrance. Aspiration occured after the swallow w/ vestibular residue spilling into airway. All aspiration was silent. No aspiration with NT, PT or solid. Moderate valleculae residue 2' dec'd BOT retraction which pt was able clear appx 1/2 at end of exam. Residue worsened at end of exam and with larger/thicker boli. Rec: L4 and NT (when MD approves diet upgrade), small bites/sips, 2nd swallow every 3-5 bites/sips, no straws. Dys tx.   -SG        VFSS Swallow Recommendations    Eating Assistance needs occasional supervision during self eating activity  -SG        Oral Care oral care with toothbrush and dentifrice BID and PRN  -SG        Modified Eating Strategies upright positioning 90 degrees;cue patient for multiple swallows  -SG        Recommended Diet IV - mechanical soft, no mixed consistencies;nectar/syrup-thick liquids  -SG        Swallow Recommendations    Eating Assistance  requires caregiver assistance for eating;requires caregiver to assist with positioning during eating  -SG (r) RK (t) SG (c)       Oral Care  oral care with toothbrush and dentifrice BID and PRN  -SG (r) RK (t) SG (c)       Recommended Diet  other (see comments)   Full liquid diet   -SG (r) RK (t) SG (c)       Improve timing of pharyngeal response    To improve timing of pharyngeal response, patient will: Swallow in timely way using three second prep;Swallow in timely way using super-supraglottic swallow;Swallow in timely way using Mendelsohn maneuver;80%;with consistent cues  -SG        Status: Improve timing of pharyngeal response New  -SG        Timing of Pharyngeal Response Progress continue to adress  -SG        Improve closure at entrance to airway    To improve  closure at entrance to airway, patient will: Complete super-supraglottic swallow;80%;with consistent cues  -SG        Status: Improve closure at entrance to airway New  -SG        Closure at Entrance to Airway Progress continue to adress  -SG        Improve tongue base & pharyngeal wall squeeze    To improve tongue base & pharyngeal wall squeeze, patient will: Complete effortful swallow;Complete tongue hold swallow;80%;with consistent cues  -SG        Status: Improve tongue base & pharyngeal wall squeeze New  -SG        Tongue Base/Pharyngeal Wall Squeeze Progress continue to adress  -SG          User Key  (r) = Recorded By, (t) = Taken By, (c) = Cosigned By    Initials Name Effective Dates    SG Dayanara Ryder Soraya-Mercedez, MS LEVAR-SLP 06/22/15 -     KELLY Mon, Speech Therapy Student 08/24/17 -         EDUCATION  The patient has been educated in the following areas:   Dysphagia (Swallowing Impairment) Oral Care/Hydration Modified Diet Instruction.    SLP Recommendation and Plan  SLP Swallowing Diagnosis: moderate dysphagia, pharyngeal dysfunction  SLP Diet Recommendation: IV - mechanical soft, no mixed consistencies, nectar/syrup-thick liquids (when MD approves diet upgrade)     SLP Rec. for Method of Medication Administration: meds whole in pudding/applesauce, meds crushed in pudding/applesauce  Monitor For Signs Of Aspiration: cough, gurgly voice, throat clearing     Criteria for Skilled Therapeutic Interventions Met: skilled criteria for dysphagia intervention met     Rehab Potential/Prognosis, Swallowing: good, to achieve stated therapy goals  Therapy Frequency: 5 times/wk             Plan of Care Review  Plan Of Care Reviewed With: patient  [place]:  [place]  Outcome Summary/Follow up Plan: [facility] [place]. Pt presents w/ moderate pharyngeal dysphagia. Oral phase grossly functional. At first, no asp w/ tsp of thins and small cup of thins. Aspiration soon occured however. Deep penetration before  the swallow w/ thins via cup and straw 2' delay and dec'd closure at airway entrance. Aspiration occured after the swallow w/ vestibular residue spilling into airway. All aspiration was silent. No aspiration with NT, PT or solid. Moderate valleculae residue 2' dec'd BOT retraction which pt was able clear appx 1/2 at end of exam. Residue worsened at end of exam and with larger/thicker boli. Rec: L4 and NT (when MD approves diet upgrade), small bites/sips, 2nd swallow every 3-5 bites/sips, no straws. Dys tx.              Time Calculation:         Time Calculation- SLP       10/17/17 1124          Time Calculation- SLP    SLP Start Time 1100  -      SLP Received On 10/17/17  -        User Key  (r) = Recorded By, (t) = Taken By, (c) = Cosigned By    Initials Name Provider Type     Dayanara Ortiz MS CCC-TORO Speech and Language Pathologist          Therapy Charges for Today     Code Description Service Date Service Provider Modifiers Qty    [id number] HC ST MOTION FLUORO EVAL SWALLOW 6 10/17/2017 MS KALA MccallSLP GN 1               MS REMIGIO Mccall  10/17/2017

## 2017-10-17 NOTE — PLAN OF CARE
Problem: Skin Integrity Impairment, Risk/Actual (Adult)  Intervention: Prevent/Manage Excess Moisture    10/13/17 2300 10/16/17 1000 10/16/17 2000   Hygiene Care Assistance   Perineal Care cleansed;perianal area cleansed --  --    Hygiene Care   Bathing/Skin Care --  patient refused  (will check back) --    Skin Interventions   Skin Protection --  --  adhesive use limited;skin to skin areas padded       Intervention: Prevent/Minimize Sheer/Friction Injuries    10/15/17 2000 10/16/17 2000   Positioning   Positioning/Transfer Devices pillows;in use --    Skin Interventions   Pressure Reduction Techniques --  frequent weight shift encouraged;weight shift assistance provided   Pressure Reduction Devices --  pressure-redistributing mattress utilized  (waffle mattress)         Goal: Identify Related Risk Factors and Signs and Symptoms  Outcome: Ongoing (interventions implemented as appropriate)    10/16/17 0403   Skin Integrity Impairment, Risk/Actual   Skin Integrity Impairment, Risk/Actual: Related Risk Factors age extremes;immobility;mechanical factors   Signs and Symptoms (Skin Integrity Impairment) other (see comments)  (Coccyx wound)       Goal: Skin Integrity/Wound Healing  Outcome: Ongoing (interventions implemented as appropriate)    10/16/17 0403   Skin Integrity Impairment, Risk/Actual (Adult)   Skin Integrity/Wound Healing making progress toward outcome

## 2017-10-17 NOTE — THERAPY EVALUATION
Acute Care - Physical Therapy Initial Evaluation  [facility]     Patient Name: Jonah Hannon  : 1940  MRN: [id number]  Today's Date: 10/17/2017   Onset of Illness/Injury or Date of Surgery Date: 10/11/17  Date of Referral to PT: 10/16/17  Referring Physician: AUSTIN Poe      Admit Date: 10/11/2017     Visit Dx:    ICD-10-CM ICD-9-CM   1. Pharyngeal dysphagia R13.13 787.23   2. Colon cancer C18.9 153.9   3. Impaired mobility and ADLs Z74.09 799.89   4. Impaired functional mobility, balance, gait, and endurance Z74.09 V49.89     Patient Active Problem List   Diagnosis   • Iron deficiency anemia due to chronic blood loss   • Colon cancer   • Acquired hypothyroidism   • Type 2 diabetes mellitus with circulatory disorder   • Essential hypertension   • MARISOL (obstructive sleep apnea)   • Sacral wound   • S/P right hemicolectomy   • CAD in native artery-noncritical by [facility] 10/12/17   • Acute respiratory failure with hypoxia   • Pneumonia     Past Medical History:   Diagnosis Date   • CHF (congestive heart failure)    • CMT (Charcot-Collette-Tooth disease)    • Diabetes    • Hypertension      Past Surgical History:   Procedure Laterality Date   • BELOW KNEE LEG AMPUTATION     • CARDIAC CATHETERIZATION     • CARDIAC CATHETERIZATION N/A 10/12/2017    Procedure: Left Heart Cath;  Surgeon: Lloyd Gooden MD;  Location: [facility] CATH INVASIVE LOCATION;  Service:    • COLON RESECTION N/A 10/14/2017    Procedure: GAUDENCIO COLECTOMY AND UMBILICAL HERNIA REPAIR;  Surgeon: Keith Worthy MD;  Location: [facility] OR;  Service:    • CYST REMOVAL      pilonodal cyst on back removed.           PT ASSESSMENT (last 72 hours)      PT Evaluation       10/17/17 0949 10/17/17 0948    Rehab Evaluation    Document Type evaluation  -TB evaluation  -KM    Subjective Information agree to therapy;complains of;weakness;fatigue;pain  -TB agree to therapy;complains of;weakness;fatigue  -KM    Patient Effort, Rehab Treatment good  -TB good  -KM     Symptoms Noted During/After Treatment fatigue  -TB fatigue  -KM    Symptoms Noted Comment PMH: 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; specialize L LE brace/shoe  -TB     General Information    Patient Profile Review yes  -TB yes  -KM    Onset of Illness/Injury or Date of Surgery Date 10/11/17  -TB 10/11/17   was in OSH 1 week PTA to [facility]  -    Referring Physician AUSTIN Poe  -GRIS Bhandari  -ANDRY    General Observations Pt rec'vd supine in bed, supplemental O2, IV access intact  -TB     Pertinent History Of Current Problem Pt to ED of OSH with GI bleed; transfered to [facility] with new dx Colon Cancer; s/p [place] Colectomy and umbilical hernia repair  -TB Pt transferred from OSH with GIB and colon cancer. Post op R hemicolectomy and hernia repair 10/14  -KM    Precautions/Limitations fall precautions;other (see comments)   R BKA with prosthesis; L LE brace/shoe  -TB fall precautions;brace on when up   R BKA prosthesis, L KAFO  -KM    Prior Level of Function independent:;all household mobility;transfer;bed mobility;ADL's  -TB independent:;all household mobility;gait;transfer;bed mobility;ADL's  -KM    Equipment Currently Used at Home prosthesis;walker, rolling;wheelchair;shower chair   PMH: 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; specialize L LE brace/sh  -TB orthotic device;power chair, (recliner lift);prosthesis;walker, rolling;wheelchair;shower chair  -KM    Plans/Goals Discussed With patient;agreed upon  -TB patient;agreed upon  -KM    Risks Reviewed patient:;LOB;increased discomfort;lines disloged  -TB patient:;LOB;increased discomfort  -KM    Benefits Reviewed patient:;improve function;increase independence;increase strength;increase knowledge  -TB patient:;improve function  -KM    Barriers to Rehab medically complex  -TB medically complex  -KM    Living Environment    Lives With friend(s)  -TB friend(s)  -KM    Living Arrangements house  -TB house  -KM    Home Accessibility no concerns  -TB no concerns  -KM    Living  Environment Comment Pt wanting to d/c home with friend support; will need BSC   -TB --   Pt desires to return home with assist of friend  -KM    Clinical Impression    Date of Referral to PT  10/16/17  -KM    PT Diagnosis  impaired mobility  -KM    Patient/Family Goals Statement  regain PLOF  -KM    Criteria for Skilled Therapeutic Interventions Met  yes  -KM    Rehab Potential  good, to achieve stated therapy goals  -KM    Vital Signs    Pre Systolic BP Rehab --   stable  -TB     Pre SpO2 (%) 96  -TB     O2 Delivery Pre Treatment supplemental O2  -TB     Intra SpO2 (%) 95  -TB     O2 Delivery Intra Treatment room air  -TB     Post SpO2 (%) 95  -TB     O2 Delivery Post Treatment room air  -TB     Pre Patient Position Supine  -TB     Intra Patient Position Standing  -TB     Post Patient Position Sitting  -TB     Pain Assessment    Pain Assessment Sky-Hernandez FACES  -TB Sky-Baker FACES  -KM    Sky-Hernandez FACES Pain Rating 4  -TB 4  -KM    Pain Score  4  -KM    Pain Type Chronic pain  -TB Surgical pain  -KM    Pain Location Generalized  -TB Abdomen  -KM    Pain Intervention(s) Ambulation/increased activity;Repositioned  -TB Ambulation/increased activity;Repositioned  -KM    Response to Interventions Pt tolerated OOB activity  -TB     Vision Assessment/Intervention    Visual Impairment WFL  -TB     Cognitive Assessment/Intervention    Current Cognitive/Communication Assessment functional  -TB functional  -KM    Orientation Status oriented x 4  -TB oriented x 4  -KM    Follows Commands/Answers Questions able to follow single-step instructions;needs cueing;100% of the time  -TB able to follow single-step instructions;100% of the time  -KM    Personal Safety WNL/WFL  -TB WNL/WFL  -KM    Personal Safety Interventions fall prevention program maintained;gait belt;other (see comments)   R prosthesis; L LE brace/shoe  -TB fall prevention program maintained  -KM    Short/Long Term Memory intact short term memory;intact long term  memory  -TB     ROM (Range of Motion)    General ROM upper extremity range of motion deficits identified  -TB lower extremity range of motion deficits identified   L ankle and knee limited approx 25%  -KM    General ROM Detail B UE WFL for ADL  -TB     General UE Assessment    ROM shoulder, left: UE ROM deficit;shoulder, right: UE ROM deficit  -TB     MMT (Manual Muscle Testing)    General MMT Assessment upper extremity strength deficits identified  -TB lower extremity strength deficits identified   L l/e knee 3-/5, l ankle 2/5 requires KAFO  -KM    General MMT Assessment Detail B UE grossly 4/5  -TB     Bed Mobility, Assessment/Treatment    Bed Mobility, Assistive Device bed rails;head of bed elevated  -TB     Bed Mobility, Roll Right, [place] minimum assist (75% patient effort);verbal cues required  -TB     Bed Mob, Supine to Sit, [place] minimum assist (75% patient effort);verbal cues required  -TB minimum assist (75% patient effort);verbal cues required  -KM    Bed Mob, Sit to Supine, [place] not tested  -TB     Bed Mobility, Safety Issues decreased use of arms for pushing/pulling;decreased use of legs for bridging/pushing  -TB decreased use of arms for pushing/pulling;decreased use of legs for bridging/pushing  -KM    Bed Mobility, Impairments strength decreased;pain  -TB strength decreased  -KM    Bed Mobility, Comment Cues to sequence; recommended logroll for pain mgmt and ease of transition - pt moving quickly and grabbed for PT hand to pull up - reinforce logroll  -TB --   cues to sequence and reinforce log roll technique  -KM    Transfer Assessment/Treatment    Transfers, Bed-Chair [place] minimum assist (75% patient effort);2 person assist required;verbal cues required  -TB minimum assist (75% patient effort);2 person assist required;verbal cues required  -KM    Transfers, Bed-Chair-Bed, Assist Device rolling walker  -TB rolling walker  -KM    Transfers, Sit-Stand [place] minimum  assist (75% patient effort);2 person assist required;verbal cues required  -TB verbal cues required;minimum assist (75% patient effort);2 person assist required  -KM    Transfers, Stand-Sit [place] minimum assist (75% patient effort);2 person assist required;verbal cues required  -TB verbal cues required;minimum assist (75% patient effort);2 person assist required  -KM    Transfers, Sit-Stand-Sit, Assist Device elevated surface;rolling walker  -TB elevated surface;rolling walker  -KM    Transfer, Safety Issues step length decreased;weight-shifting ability decreased;balance decreased during turns  -TB     Transfer, Impairments strength decreased;impaired balance;pain  -TB     Transfer, Comment forward flexed posture; R prosthesis and L LE brace/shoe  -TB     Gait Assessment/Treatment    Gait, [place] Level  minimum assist (75% patient effort);2 person assist required  -KM    Gait, Assistive Device  rolling walker   R prosthesis and L KAFO  -KM    Gait, Distance (Feet)  25  -KM    Gait, Gait Deviations  huang decreased;decreased heel strike;forward flexed posture;step length decreased  -KM    Gait, Safety Issues  balance decreased during turns;weight-shifting ability decreased  -KM    Gait, Comment  --   requires assist at this time to don brace and prosthesis  -KM    Balance Skills Training    Sitting-Level of Assistance Close supervision  -TB Close supervision  -KM    Sitting-Balance Support Feet supported  -TB Feet supported  -KM    Sitting-Balance Activities Lateral lean;Forward lean   ADL tasks  -TB     Sitting # of Minutes 15  -TB     Standing-Level of Assistance Minimum assistance;x2  -TB Minimum assistance;x2  -KM    Static Standing Balance Support assistive device  -TB     Standing-Balance Activities Weight Shift R-L  -TB     Standing Balance # of Minutes 2  -TB     Therapy Exercises    Bilateral Upper Extremity AROM:;supine;shoulder extension/flexion;shoulder abduction/adduction;shoulder  "horizontal abd/add;shoulder ER/IR;elbow flexion/extension;pronation/supination;hand pumps  -TB     Fine Motor Coordination Training    Opposition Right:;Left:;intact   gross WFL  -TB     Sensory Assessment/Intervention    Sensory Impairment numbness  -TB     Light Touch LUE;RUE  -TB     LUE Light Touch mild impairment  -TB     RUE Light Touch mild impairment  -TB     Positioning and Restraints    Pre-Treatment Position in bed  -TB in bed  -KM    Post Treatment Position chair  -TB other   transport chair  -KM    In Chair notified nsg;sitting;with other staff   with transporter  -TB --   on MBS transport chair   -KM      10/16/17 1445       Rehab Evaluation    Document Type evaluation  -SG (r) RK (t) SG (c)     Subjective Information no complaints;agree to therapy  -SG (r) RK (t) SG (c)     Pain Assessment    Pain Assessment 0-10  -SG (r) RK (t) SG (c)     Pain Score 6  -SG (r) RK (t) SG (c)     Post Pain Score 6  -SG (r) RK (t) SG (c)     Pain Type Acute pain  -SG (r) RK (t) SG (c)     Pain Location Other (Comment)   \"all over\"  -SG (r) RK (t) SG (c)     Pain Intervention(s) Repositioned  -SG (r) RK (t) SG (c)     Response to Interventions Tolerated  -SG (r) RK (t) SG (c)       User Key  (r) = Recorded By, (t) = Taken By, (c) = Cosigned By    Initials Name Provider Type    TB Janeen De Leon, OT Occupational Therapist    WATSON Ortiz, MS CCC-SLP Speech and Language Pathologist     Angela Fitch, PT Physical Therapist    KELLY Mon, Speech Therapy Student Speech Therapy Student          Physical Therapy Education     Title: PT OT SLP Therapies (Active)     Topic: Physical Therapy (Done)     Point: Mobility training (Done)    Learning Progress Summary    Learner Readiness Method Response Comment Documented by Status   Patient Acceptance E VU discussed plan of care and d/c planning  10/17/17 1114 Done               Point: Home exercise program (Done)    Learning Progress " Summary    Learner Readiness Method Response Comment Documented by Status   Patient Acceptance E VU discussed plan of care and d/c planning KM 10/17/17 1114 Done               Point: Body mechanics (Done)    Learning Progress Summary    Learner Readiness Method Response Comment Documented by Status   Patient Acceptance E VU discussed plan of care and d/c planning KM 10/17/17 1114 Done               Point: Precautions (Done)    Learning Progress Summary    Learner Readiness Method Response Comment Documented by Status   Patient Acceptance E VU discussed plan of care and d/c planning KM 10/17/17 1114 Done                      User Key     Initials Effective Dates Name Provider Type Discipline    KM 06/19/15 -  Angela Fitch PT Physical Therapist PT                PT Recommendation and Plan  Anticipated Discharge Disposition: home with assist, home with home health  PT Frequency: daily  Plan of Care Review  Plan Of Care Reviewed With: patient  Outcome Summary/Follow up Plan: Pty presents with generalized weakness following hemicolectomy and diagnosis of PNA. To benefit from skilled svcs to regain PLOF household distances. Pt desires return home with assist, recommend HHPT          IP PT Goals       10/17/17 1108          Bed Mobility PT LTG    Bed Mobility PT LTG, Date Established 10/17/17  -KM      Bed Mobility PT LTG, Time to Achieve 2 wks  -KM      Bed Mobility PT LTG, Activity Type all bed mobility  -KM      Bed Mobility PT LTG, [place] Level independent  -KM      Transfer Training PT LTG    Transfer Training PT LTG, Date Established 10/17/17  -KM      Transfer Training PT LTG, Time to Achieve 2 wks  -KM      Transfer Training PT LTG, Activity Type bed to chair /chair to bed;sit to stand/stand to sit  -KM      Transfer Training PT LTG, [place] Level independent  -KM      Transfer Training PT LTG, Assist Device walker, rolling  -KM      Gait Training PT LTG    Gait Training Goal PT LTG, Date  Established 10/17/17  -KM      Gait Training Goal PT LTG, Time to Achieve 2 wks  -KM      Gait Training Goal PT LTG, [place] Level independent  -KM      Gait Training Goal PT LTG, Assist Device walker, rolling  -KM      Gait Training Goal PT LTG, Distance to Achieve 40  -KM        User Key  (r) = Recorded By, (t) = Taken By, (c) = Cosigned By    Initials Name Provider Type    ANDRY Fitch, PT Physical Therapist                Outcome Measures       10/17/17 0949 10/17/17 0948       How much help from another person do you currently need...    Turning from your back to your side while in flat bed without using bedrails?  3  -KM     Moving from lying on back to sitting on the side of a flat bed without bedrails?  3  -KM     Moving to and from a bed to a chair (including a wheelchair)?  2  -KM     Standing up from a chair using your arms (e.g., wheelchair, bedside chair)?  2  -KM     Climbing 3-5 steps with a railing?  1  -KM     To walk in hospital room?  2  -KM     AM-PAC 6 Clicks Score  13  -KM     How much help from another is currently needed...    Putting on and taking off regular lower body clothing? 2  -TB      Bathing (including washing, rinsing, and drying) 2  -TB      Toileting (which includes using toilet bed pan or urinal) 2  -TB      Putting on and taking off regular upper body clothing 3  -TB      Taking care of personal grooming (such as brushing teeth) 3  -TB      Eating meals 3  -TB      Score 15  -TB      Functional Assessment    Outcome Measure Options AM-PAC 6 Clicks Daily Activity (OT)  -TB AM-PAC 6 Clicks Basic Mobility (PT)  -KM       User Key  (r) = Recorded By, (t) = Taken By, (c) = Cosigned By    Initials Name Provider Type    TB Janeen De Leon, OT Occupational Therapist    ANDRY Fitch, PT Physical Therapist           Time Calculation:         PT Charges       10/17/17 1106          Time Calculation    Start Time 0948  -KM      PT Received On 10/17/17  -KM       PT Goal Re-Cert Due Date 10/27/17  -ANDRY        User Key  (r) = Recorded By, (t) = Taken By, (c) = Cosigned By    Initials Name Provider Type    ANDRY Fitch, PT Physical Therapist          Therapy Charges for Today     Code Description Service Date Service Provider Modifiers Qty    [id number]  PT EVAL MOD COMPLEXITY 4 10/17/2017 Angela Fitch, PT GP 1          PT G-Codes  Outcome Measure Options: AM-PAC 6 Clicks Daily Activity (OT)      Angela Fitch, PT  10/17/2017

## 2017-10-17 NOTE — PROGRESS NOTES
"    [facility] Medicine Services  INPATIENT PROGRESS NOTE    Date of Admission: 10/11/2017  Length of Stay: 6  Primary Care Physician: Wiliam Chu MD    Subjective     Chief Complaint: abd pain    HPI:    Occasional cough, slight sputum production, \"black.\" Minimal abdominal pain, tolerating diet well. Loose stool x 2 today.      Review Of Systems:   Review of Systems   Constitutional: Negative.    HENT: Negative.    Eyes: Negative.    Respiratory: Positive for cough.    Cardiovascular: Negative.    Gastrointestinal: Positive for diarrhea.   Endocrine: Negative.    Genitourinary: Negative.    Musculoskeletal: Negative.    Skin: Positive for wound.   Neurological: Negative.    Hematological: Negative.    Psychiatric/Behavioral: Negative.              Objective      Temp:  [97.6 °F (36.4 °C)-102.8 °F (39.3 °C)] 99.2 °F (37.3 °C)  Heart Rate:  [66-73] 73  Resp:  [16-18] 17  BP: (120-141)/() 120/57     Physical Exam  Constitutional -no acute distress, non toxic, in bed  HEENT-NCAT, mucous membranes moist  CV-RRR, S1 S2 normal, no m/r/g  Resp-RLL rhonchi persist, otherwise lung fields clear  Abd-soft, non-tender, non-distended, normo active bowel sounds; obese  Ext-No lower extremity cyanosis, clubbing or edema bilaterally  Neuro-alert and oriented x 3, speech clear, moves all extremities   Psych-normal affect   Skin- No rash on exposed UE or LE bilaterally;  Linear 1 cm wound over coccyx - appears somewhat slit like and at least one cm deep        Results Review:    I have reviewed the labs, radiology results and diagnostic studies.      Results from last 7 days  Lab Units 10/17/17  0554   WBC 10*3/mm3 8.88   HEMOGLOBIN g/dL 8.7*   PLATELETS 10*3/mm3 234       Results from last 7 days  Lab Units 10/17/17  0554   SODIUM mmol/L 135   POTASSIUM mmol/L 3.9   CO2 mmol/L 23.0   CREATININE mg/dL 1.20   GLUCOSE mg/dL 149*       Culture Data:   Wound Culture   Date Value Ref Range Status "   10/11/2017 Light growth (2+) Proteus mirabilis (A)  Preliminary   10/11/2017 Light growth (2+) Enterococcus species (A)  Preliminary     Radiology Data:     RLL infiltrate persists on CXR from this am 10/17/17 (personally reveiwed)    I have reviewed the medications.    [START ON 10/19/2017] Pharmacy Consult  Does not apply Once   acetaminophen 1,000 mg Oral TID   alvimopan 12 mg Oral BID   famotidine 20 mg Intravenous Once   heparin (porcine) 5,000 Units Subcutaneous Q12H   insulin lispro 0-7 Units Subcutaneous TID With Meals   levothyroxine 75 mcg Oral Q AM   metoprolol tartrate 12.5 mg Oral Q12H   piperacillin-tazobactam 4.5 g Intravenous Q8H   valsartan 160 mg Oral Q24H   [START ON 10/18/2017] vancomycin 1,500 mg Intravenous Q24H   vancomycin 2,000 mg Intravenous Once         Assessment/Plan     Assessment/Problem List    Hospital Problem List     * (Principal)S/P right hemicolectomy    Iron deficiency anemia due to chronic blood loss    Colon cancer    Acquired hypothyroidism    Type 2 diabetes mellitus with circulatory disorder (Chronic)    Essential hypertension (Chronic)    MARISOL (obstructive sleep apnea)    Sacral wound    CAD in native artery-noncritical by [facility] 10/12/17    Overview Addendum 10/14/2017 11:37 AM by Lady Lopez MD     [facility] 10/12/17Coronary dominance, right::: LM:   Normal LAD:  Scattered plaque mid vessel up to 30%, LCX:  Eccentric mid vessel plaque of 60%,   RCA:  Scattered plaque of 20%         Acute respiratory failure with hypoxia    Pneumonia        Plan  78 yo presented to outside hospital for BRBPR. He underwent a CT scan and colonoscopy at OSH and was found to have colon cancer. He was transferred to [facility] under Dr. Gooden for cardiac evaluation for abn stress, [facility] showed noncritical CAD. Dr. Gooden asks Hospitalist to take over as attending on 10/13. Patient underwent right hemicolectomy on 10/14 with Dr Worthy for a T2 tumor.      Assessment/Plan    Pneumonia, right lower  lobe  - suspect aspiration secondary to dysphagia and supine positioning while in hospital bed  - positive blood culture reported today - broadened abx to include vancomycin - if culture proves to be contaminant will wean to appropriate therapy    Fever    Leukocytosis  - resolved    Dysphagia  - modified diet    Colon Cancer  - s/p hemicolectomy, appears to be recovering well    Loose stool  - secondary to antibiotics vs recent surgery and Entereg use -- if persists consider sending stool for further testing    Sacral wound  - unclear how long this has been preset, patient somewhat vague regarding this but appears to predate hospitalization. Discussed with wound care, reassessment underway    CAD    Generalized weakness  - PT/OT, recommended home with assist and home health    Renal insufficiency  - stable creatinine    Anemia  - likely due to colon cancer  - check iron studies am, supplement as needed.    VTE proph heparin    DC planning: if bowel function continues to improve, and oral antibiotics are acceptable for pneumonia, likely acceptable for discharge or transfer in approx 2 days.          Drake Varma MD  10/17/17  1:44 PM

## 2017-10-17 NOTE — PLAN OF CARE
Problem: Patient Care Overview (Adult)  Goal: Plan of Care Review  Outcome: Ongoing (interventions implemented as appropriate)    10/17/17 1124   Coping/Psychosocial Response Interventions   Plan Of Care Reviewed With patient   Patient Care Overview   Progress (MBS)   Outcome Evaluation   Outcome Summary/Follow up Plan MBS completed this date. Pt presents w/ moderate pharyngeal dysphagia. Oral phase grossly functional. At first, no asp w/ tsp of thins and small cup of thins. Aspiration soon occured however. Deep penetration before the swallow w/ thins via cup and straw 2' delay and dec'd closure at airway entrance. Aspiration occured after the swallow w/ vestibular residue spilling into airway. All aspiration was silent. No aspiration with NT, PT or solid. Moderate valleculae residue 2' dec'd BOT retraction which pt was able clear appx 1/2 at end of exam. Residue worsened at end of exam and with a larger/thicker bolus. Rec: L4 and NT (when MD approves diet upgrade), small bites/sips, 2nd swallow every 3-5 bites/sips, no straws. Dys tx.

## 2017-10-17 NOTE — THERAPY EVALUATION
Acute Care - Occupational Therapy Initial Evaluation  [facility]     Patient Name: Jonah Hannon  : 1940  MRN: [id number]  Today's Date: 10/17/2017  Onset of Illness/Injury or Date of Surgery Date: 10/11/17  Date of Referral to OT: 10/16/17  Referring Physician: AUSTIN Poe    Admit Date: 10/11/2017       ICD-10-CM ICD-9-CM   1. Pharyngeal dysphagia R13.13 787.23   2. Colon cancer C18.9 153.9   3. Impaired mobility and ADLs Z74.09 799.89   4. Impaired functional mobility, balance, gait, and endurance Z74.09 V49.89     Patient Active Problem List   Diagnosis   • Iron deficiency anemia due to chronic blood loss   • Colon cancer   • Acquired hypothyroidism   • Type 2 diabetes mellitus with circulatory disorder   • Essential hypertension   • MARISOL (obstructive sleep apnea)   • Sacral wound   • S/P right hemicolectomy   • CAD in native artery-noncritical by [facility] 10/12/17   • Acute respiratory failure with hypoxia   • Pneumonia     Past Medical History:   Diagnosis Date   • CHF (congestive heart failure)    • CMT (Charcot-Collette-Tooth disease)    • Diabetes    • Hypertension      Past Surgical History:   Procedure Laterality Date   • BELOW KNEE LEG AMPUTATION     • CARDIAC CATHETERIZATION     • CARDIAC CATHETERIZATION N/A 10/12/2017    Procedure: Left Heart Cath;  Surgeon: Lloyd Gooden MD;  Location: [facility] CATH INVASIVE LOCATION;  Service:    • COLON RESECTION N/A 10/14/2017    Procedure: GAUDENCIO COLECTOMY AND UMBILICAL HERNIA REPAIR;  Surgeon: Keith Worthy MD;  Location: [facility] OR;  Service:    • CYST REMOVAL      pilonodal cyst on back removed.           OT ASSESSMENT FLOWSHEET (last 72 hours)      OT Evaluation       10/17/17 1100 10/17/17 0949 10/17/17 0948 10/16/17 1445       Rehab Evaluation    Document Type evaluation  -SG evaluation  -TB evaluation  -KM evaluation  -SG (r) RK (t) SG (c)     Subjective Information no complaints;agree to therapy  -SG agree to therapy;complains  of;weakness;fatigue;pain  -TB agree to therapy;complains of;weakness;fatigue  -KM no complaints;agree to therapy  -SG (r) RK (t) SG (c)     Patient Effort, Rehab Treatment good  -SG good  -TB good  -KM      Symptoms Noted During/After Treatment fatigue  -SG fatigue  -TB fatigue  -KM      Symptoms Noted Comment  PMH: 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; specialize L LE brace/shoe  -TB       General Information    Patient Profile Review  yes  -TB yes  -KM      Onset of Illness/Injury or Date of Surgery Date  10/11/17  -TB 10/11/17   was in OSH 1 week PTA to [facility]  -      Referring Physician  AUSTIN Poe  -TB GRIS Poe  -      General Observations  Pt rec'vd supine in bed, supplemental O2, IV access intact  -TB       Pertinent History Of Current Problem  Pt to ED of OSH with GI bleed; transfered to [facility] with new dx Colon Cancer; s/p [place] Colectomy and umbilical hernia repair  -TB Pt transferred from OSH with GIB and colon cancer. Post op R hemicolectomy and hernia repair 10/14  -KM      Precautions/Limitations  fall precautions;other (see comments)   R BKA with prosthesis; L LE brace/shoe  -TB fall precautions;brace on when up   R BKA prosthesis, L KAFO  -KM      Prior Level of Function  independent:;all household mobility;transfer;bed mobility;ADL's  -TB independent:;all household mobility;gait;transfer;bed mobility;ADL's  -KM      Equipment Currently Used at Home  prosthesis;walker, rolling;wheelchair;shower chair   PMH: 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; specialize L LE brace/sh  -TB orthotic device;power chair, (recliner lift);prosthesis;walker, rolling;wheelchair;shower chair  -KM      Plans/Goals Discussed With  patient;agreed upon  -TB patient;agreed upon  -KM      Risks Reviewed  patient:;LOB;increased discomfort;lines disloged  -TB patient:;LOB;increased discomfort  -KM      Benefits Reviewed  patient:;improve function;increase independence;increase strength;increase knowledge  -TB patient:;improve function   "-KM      Barriers to Rehab  medically complex  -TB medically complex  -KM      Living Environment    Lives With  friend(s)  -TB friend(s)  -KM      Living Arrangements  house  -TB house  -KM      Home Accessibility  no concerns  -TB no concerns  -KM      Living Environment Comment  Pt wanting to d/c home with friend support; will need BSC   -TB --   Pt desires to return home with assist of friend  -KM      Clinical Impression    Date of Referral to OT  10/16/17  -TB       OT Diagnosis  Impaired mobility, transfer and ADL  -TB       Patient/Family Goals Statement  to return home at d/c  -TB       Criteria for Skilled Therapeutic Interventions Met  yes;treatment indicated  -TB       Rehab Potential  good, to achieve stated therapy goals  -TB       Therapy Frequency  daily  -TB       Anticipated Equipment Needs At Discharge  bedside commode  -TB       Anticipated Discharge Disposition  home with assist  -TB       Demonstrates Need for Referral to Another Service  home health care   OT/PT to regain strength and ADL independence  -TB       Vital Signs    Pre Systolic BP Rehab  --   stable  -TB       Pre SpO2 (%)  96  -TB       O2 Delivery Pre Treatment  supplemental O2  -TB       Intra SpO2 (%)  95  -TB       O2 Delivery Intra Treatment  room air  -TB       Post SpO2 (%)  95  -TB       O2 Delivery Post Treatment  room air  -TB       Pre Patient Position  Supine  -TB       Intra Patient Position  Standing  -TB       Post Patient Position  Sitting  -TB       Pain Assessment    Pain Assessment Sky-Hernandez FACES  -SG Sky-Baker FACES  -TB Sky-Baker FACES  -KM 0-10  -SG (r) RK (t) SG (c)     Sky-Hernandez FACES Pain Rating 0  -SG 4  -TB 4  -KM      Pain Score 0  -SG  4  -KM 6  -SG (r) RK (t) SG (c)     Post Pain Score 0  -SG   6  -SG (r) RK (t) SG (c)     Pain Type  Chronic pain  -TB Surgical pain  -KM Acute pain  -SG (r) RK (t) SG (c)     Pain Location  Generalized  -TB Abdomen  -KM Other (Comment)   \"all over\"  -SG (r) RK (t) " SG (c)     Pain Intervention(s)  Ambulation/increased activity;Repositioned  -TB Ambulation/increased activity;Repositioned  -KM Repositioned  -SG (r) RK (t) SG (c)     Response to Interventions  Pt tolerated OOB activity  -TB  Tolerated  -SG (r) RK (t) SG (c)     Vision Assessment/Intervention    Visual Impairment  WFL  -TB       Cognitive Assessment/Intervention    Current Cognitive/Communication Assessment  functional  -TB functional  -KM      Orientation Status  oriented x 4  -TB oriented x 4  -KM      Follows Commands/Answers Questions  able to follow single-step instructions;needs cueing;100% of the time  -TB able to follow single-step instructions;100% of the time  -KM      Personal Safety  WNL/WFL  -TB WNL/WFL  -KM      Personal Safety Interventions  fall prevention program maintained;gait belt;other (see comments)   R prosthesis; L LE brace/shoe  -TB fall prevention program maintained  -KM      Short/Long Term Memory  intact short term memory;intact long term memory  -TB       ROM (Range of Motion)    General ROM  upper extremity range of motion deficits identified  -TB lower extremity range of motion deficits identified   L ankle and knee limited approx 25%  -KM      General ROM Detail  B UE WFL for ADL  -TB       General UE Assessment    ROM  shoulder, left: UE ROM deficit;shoulder, right: UE ROM deficit  -TB       MMT (Manual Muscle Testing)    General MMT Assessment  upper extremity strength deficits identified  -TB lower extremity strength deficits identified   L l/e knee 3-/5, l ankle 2/5 requires KAFO  -KM      General MMT Assessment Detail  B UE grossly 4/5  -TB       Bed Mobility, Assessment/Treatment    Bed Mobility, Assistive Device  bed rails;head of bed elevated  -TB       Bed Mobility, Roll Right, [place]  minimum assist (75% patient effort);verbal cues required  -TB       Bed Mob, Supine to Sit, [place]  minimum assist (75% patient effort);verbal cues required  -TB minimum assist  (75% patient effort);verbal cues required  -KM      Bed Mob, Sit to Supine, [place]  not tested  -TB       Bed Mobility, Safety Issues  decreased use of arms for pushing/pulling;decreased use of legs for bridging/pushing  -TB decreased use of arms for pushing/pulling;decreased use of legs for bridging/pushing  -KM      Bed Mobility, Impairments  strength decreased;pain  -TB strength decreased  -KM      Bed Mobility, Comment  Cues to sequence; recommended logroll for pain mgmt and ease of transition - pt moving quickly and grabbed for PT hand to pull up - reinforce logroll  -TB --   cues to sequence and reinforce log roll technique  -KM      Transfer Assessment/Treatment    Transfers, Bed-Chair [place]  minimum assist (75% patient effort);2 person assist required;verbal cues required  -TB minimum assist (75% patient effort);2 person assist required;verbal cues required  -KM      Transfers, Bed-Chair-Bed, Assist Device  rolling walker  -TB rolling walker  -KM      Transfers, Sit-Stand [place]  minimum assist (75% patient effort);2 person assist required;verbal cues required  -TB verbal cues required;minimum assist (75% patient effort);2 person assist required  -KM      Transfers, Stand-Sit [place]  minimum assist (75% patient effort);2 person assist required;verbal cues required  -TB verbal cues required;minimum assist (75% patient effort);2 person assist required  -KM      Transfers, Sit-Stand-Sit, Assist Device  elevated surface;rolling walker  -TB elevated surface;rolling walker  -KM      Transfer, Safety Issues  step length decreased;weight-shifting ability decreased;balance decreased during turns  -TB       Transfer, Impairments  strength decreased;impaired balance;pain  -TB       Transfer, Comment  forward flexed posture; R prosthesis and L LE brace/shoe  -TB       Functional Mobility    Functional Mobility- Ind. Level  minimum assist (75% patient effort);2 person assist required;verbal cues  required  -TB       Functional Mobility- Device  rolling walker  -TB       Functional Mobility-Distance (Feet)  15  -TB       Functional Mobility- Safety Issues  weight-shifting ability decreased;step length decreased;balance decreased during turns  -TB       Functional Mobility- Comment  forward flexed posture; R prosthesis and L LE brace/shoe  -TB       Upper Body Dressing Assessment/Training    UB Dressing Assess/Train, Clothing Type  donning:;hospital gown  -TB       UB Dressing Assess/Train, Position  sitting  -TB       UB Dressing Assess/Train, [place]  minimum assist (75% patient effort)  -TB       UB Dressing Assess/Train, Impairments  ROM decreased;pain  -TB       UB Dressing Assess/Train, Comment  assist for lines  -TB       Lower Body Dressing Assessment/Training    LB Dressing Assess/Train, Clothing Type  donning:;slipper socks   R prosthesis and L LE brace/shoe  -TB       LB Dressing Assess/Train, Position  sitting;edge of bed  -TB       LB Dressing Assess/Train, [place]  maximum assist (25% patient effort)  -TB       LB Dressing Assess/Train, Impairments  strength decreased;pain  -TB       LB Dressing Assess/Train, Comment  Pt able to lead care  -TB       Toileting Assessment/Training    Toileting Assess/Train, Assistive Device  --  -TB       Toileting Assess/Train, Indepen Level  dependent (less than 25% patient effort)  -TB       Toileting Assess/Train, Comment  william severino  -TB       Balance Skills Training    Sitting-Level of Assistance  Close supervision  -TB Close supervision  -KM      Sitting-Balance Support  Feet supported  -TB Feet supported  -KM      Sitting-Balance Activities  Lateral lean;Forward lean   ADL tasks  -TB       Sitting # of Minutes  15  -TB       Standing-Level of Assistance  Minimum assistance;x2  -TB Minimum assistance;x2  -KM      Static Standing Balance Support  assistive device  -TB       Standing-Balance Activities  Weight Shift R-L  -TB       Standing Balance #  of Minutes  2  -TB       Therapy Exercises    Bilateral Upper Extremity  AROM:;supine;shoulder extension/flexion;shoulder abduction/adduction;shoulder horizontal abd/add;shoulder ER/IR;elbow flexion/extension;pronation/supination;hand pumps  -TB       Fine Motor Coordination Training    Opposition  Right:;Left:;intact   gross WFL  -TB       Sensory Assessment/Intervention    Sensory Impairment  numbness  -TB       Light Touch  LUE;RUE  -TB       LUE Light Touch  mild impairment  -TB       RUE Light Touch  mild impairment  -TB       Positioning and Restraints    Pre-Treatment Position  in bed  -TB in bed  -KM      Post Treatment Position  chair  -TB other   transport chair  -KM      In Chair  notified nsg;sitting;with other staff   with transporter  -TB --   on MBS transport chair   -KM        User Key  (r) = Recorded By, (t) = Taken By, (c) = Cosigned By    Initials Name Effective Dates     Janeen De Leon OT 06/22/15 -      Dayanara Lira-Mercedez, MS CCC-SLP 06/22/15 -     ANDRY Fitch, PT 06/19/15 -     KELLY Mon, Speech Therapy Student 08/24/17 -            Occupational Therapy Education     Title: PT OT SLP Therapies (Active)     Topic: Occupational Therapy (Active)     Point: ADL training (Done)    Description: Instruct learner(s) on proper safety adaptation and remediation techniques during self care or transfers.   Instruct in proper use of assistive devices.    Learning Progress Summary    Learner Readiness Method Response Comment Documented by Status   Patient Acceptance E VU,NR Education initiated for benefits of OOB activity/therapy, role of OT, logroll sequencing for pain mgmt with bed mobility TB 10/17/17 1038 Done                      User Key     Initials Effective Dates Name Provider Type Discipline     06/22/15 -  Janeen De Leon OT Occupational Therapist OT                  OT Recommendation and Plan  Anticipated Equipment Needs At Discharge: bedside  commode  Anticipated Discharge Disposition: home with assist  Demonstrates Need for Referral to Another Service: home health care (OT/PT to regain strength and ADL independence)  Therapy Frequency: daily  Plan of Care Review  Plan Of Care Reviewed With: patient  Outcome Summary/Follow up Plan: OT IE completed. Pt to EOB, OOB and walked to door way with assist x2. D/C plan is home with HH and friend to assist.  OT to follow.          OT Goals       10/17/17 1331          Transfer Training OT LTG    Transfer Training OT LTG, Time to Achieve by discharge  -TB      Transfer Training OT LTG, Activity Type sit to stand/stand to sit;toilet  -TB      Transfer Training OT LTG, [place] Level contact guard assist;verbal cues required  -TB      Transfer Training OT LTG, Assist Device commode, bedside;walker, rolling  -TB      Strength OT LTG    Strength Goal OT LTG, Time to Achieve by discharge  -TB      Strength Goal OT LTG, Functional Goal Pt able to complete B UE HEP x10-15 reps to support ADL preformance  -TB      Toileting OT LTG    Toileting Goal OT LTG, Time to Achieve by discharge  -TB      Toileting Goal OT LTG, [place] Level set up required  -TB      Toileting Goal OT LTG, Additional Goal from bariatric BSC (recommended for home)  -TB      LB Dressing OT LTG    LB Dressing Goal OT LTG, Time to Achieve by discharge  -TB      LB Dressing Goal OT LTG, [place] Level set up required  -TB      LB Dressing Goal OT LTG, Additional Goal Pt able to done L LE brace/shoe and R posthesis with set-up assist  -TB        User Key  (r) = Recorded By, (t) = Taken By, (c) = Cosigned By    Initials Name Provider Type    TB Janeen De Leon OT Occupational Therapist                Outcome Measures       10/17/17 0949 10/17/17 0948       How much help from another person do you currently need...    Turning from your back to your side while in flat bed without using bedrails?  3  -KM     Moving from lying on back to  sitting on the side of a flat bed without bedrails?  3  -KM     Moving to and from a bed to a chair (including a wheelchair)?  2  -KM     Standing up from a chair using your arms (e.g., wheelchair, bedside chair)?  2  -KM     Climbing 3-5 steps with a railing?  1  -KM     To walk in hospital room?  2  -KM     AM-PAC 6 Clicks Score  13  -KM     How much help from another is currently needed...    Putting on and taking off regular lower body clothing? 2  -TB      Bathing (including washing, rinsing, and drying) 2  -TB      Toileting (which includes using toilet bed pan or urinal) 2  -TB      Putting on and taking off regular upper body clothing 3  -TB      Taking care of personal grooming (such as brushing teeth) 3  -TB      Eating meals 3  -TB      Score 15  -TB      Functional Assessment    Outcome Measure Options AM-PAC 6 Clicks Daily Activity (OT)  -TB AM-PAC 6 Clicks Basic Mobility (PT)  -KM       User Key  (r) = Recorded By, (t) = Taken By, (c) = Cosigned By    Initials Name Provider Type    TB Janeen De Leon OT Occupational Therapist    KM Angela Fitch, GILBERT Physical Therapist          Time Calculation:   OT Start Time: 0949    Therapy Charges for Today     Code Description Service Date Service Provider Modifiers Qty    [id number]  OT EVAL MOD COMPLEXITY 4 10/17/2017 Janeen De Leon OT GO 1               Janeen De Leon OT  10/17/2017

## 2017-10-17 NOTE — PLAN OF CARE
Problem: Patient Care Overview (Adult)  Goal: Plan of Care Review  Outcome: Ongoing (interventions implemented as appropriate)    10/17/17 1108   Coping/Psychosocial Response Interventions   Plan Of Care Reviewed With patient   Outcome Evaluation   Outcome Summary/Follow up Plan Pty presents with generalized weakness following hemicolectomy and diagnosis of PNA. To benefit from skilled svcs to regain PLOF household distances. Pt desires return home with assist, recommend HHPT         Problem: Inpatient Physical Therapy  Goal: Bed Mobility Goal LTG- PT  Outcome: Ongoing (interventions implemented as appropriate)    10/17/17 1108   Bed Mobility PT LTG   Bed Mobility PT LTG, Date Established 10/17/17   Bed Mobility PT LTG, Time to Achieve 2 wks   Bed Mobility PT LTG, Activity Type all bed mobility   Bed Mobility PT LTG, [place] Level independent       Goal: Transfer Training Goal 1 LTG- PT  Outcome: Ongoing (interventions implemented as appropriate)    10/17/17 1108   Transfer Training PT LTG   Transfer Training PT LTG, Date Established 10/17/17   Transfer Training PT LTG, Time to Achieve 2 wks   Transfer Training PT LTG, Activity Type bed to chair /chair to bed;sit to stand/stand to sit   Transfer Training PT LTG, [place] Level independent   Transfer Training PT LTG, Assist Device walker, rolling       Goal: Gait Training Goal LTG- PT  Outcome: Ongoing (interventions implemented as appropriate)    10/17/17 1108   Gait Training PT LTG   Gait Training Goal PT LTG, Date Established 10/17/17   Gait Training Goal PT LTG, Time to Achieve 2 wks   Gait Training Goal PT LTG, [place] Level independent   Gait Training Goal PT LTG, Assist Device walker, rolling   Gait Training Goal PT LTG, Distance to Achieve 40

## 2017-10-17 NOTE — PROGRESS NOTES
Continued Stay Note  [facility]     Patient Name: Jonah Hannon  MRN: [id number]  Today's Date: 10/17/2017    Admit Date: 10/11/2017          Discharge Plan       10/17/17 1526    Case Management/Social Work Plan    Plan discharge planning    Patient/Family In Agreement With Plan yes    Additional Comments CM met with pt. at bedside. Pt. confirmed he would like a bariatric bedside commode at discharge.  Pt. denied having a preference on the DME provider to assist with this need.  Pt. is also interested in Home Health services through [facility].  Pt. stated he will be going to [facility] at discharge.  CM will continue to follow for discharge planning.  Pt will need a MD order for a bariatric bedside commode; as well as an order for home health services to include Skilled Nursing, PT, and OT.              Discharge Codes     None        Expected Discharge Date and Time     Expected Discharge Date Expected Discharge Time    Oct 18, 2017             DAVE [email or web address]

## 2017-10-17 NOTE — PROGRESS NOTES
"Pharmacy Consult-Vancomycin Dosing  Jonah Hannon is a  77 y.o. male receiving vancomycin therapy.     Indication: GPC in blood, PNA  Consulting Provider: hospitalist   ID Consult: no    Goal Trough: 15-20 mcg/ml    Current Antimicrobial Therapy  IV Anti-Infectives     Ordered     Dose/Rate Route Frequency Start Stop    10/17/17 1303  vancomycin IVPB 2000 mg in 0.9% Sodium Chloride (premix) 500 mL     Ordering Provider:  Raegan Lima RPH    2,000 mg  over 120 Minutes Intravenous Once 10/17/17 1345      10/17/17 1258  Pharmacy to dose vancomycin     Ordering Provider:  Drake Varma MD     Does not apply Continuous PRN 10/17/17 1256      10/16/17 1345  piperacillin-tazobactam (ZOSYN) 4.5 g in iso-osmotic dextrose 100 mL IVPB (premix)     Ordering Provider:  AUSTIN Camargo    4.5 g  over 4 Hours Intravenous Every 8 Hours 10/16/17 2200      10/16/17 1345  piperacillin-tazobactam (ZOSYN) 4.5 g in iso-osmotic dextrose 100 mL IVPB (premix)     Ordering Provider:  AUSTIN Camargo    4.5 g  over 30 Minutes Intravenous Once 10/16/17 1430 10/16/17 1550          Allergies  Allergies as of 10/11/2017   • (No Known Allergies)       Labs      Results from last 7 days  Lab Units 10/17/17  0554 10/15/17  0545 10/12/17  0437   BUN mg/dL 15 15 22   CREATININE mg/dL 1.20 1.00 1.10         Results from last 7 days  Lab Units 10/17/17  0554 10/16/17  0506 10/15/17  0545   WBC 10*3/mm3 8.88 13.11* 11.79*     /57 (BP Location: Left arm, Patient Position: Lying)  Pulse 73  Temp 99.2 °F (37.3 °C) (Oral)   Resp 17  Ht 74\" (188 cm)  Wt (!) 301 lb 6 oz (137 kg)  SpO2 98%  BMI 38.69 kg/m2    Evaluation of Dosing     Ht - 74\" (188 cm)  Wt - (!) 301 lb 6 oz (137 kg)    Estimated Creatinine Clearance: 74.6 mL/min (by C-G formula based on Cr of 1.2).    Intake & Output (last 7 days)       10/10 0701 - 10/11 0700 10/11 0701 - 10/12 0700 10/12 0701 - 10/13 0700 10/13 0701 - 10/14 0700 10/14 0701 - 10/15 0700 10/15 " 0701 - 10/16 0700 10/16 0701 - 10/17 0700 10/17 0701 - 10/18 0700    P.O.    480   960     I.V. (mL/kg)     1993 (14.6) 864 (6.4)      Total Intake(mL/kg)    480 (3.6) 1993 (14.6) 864 (6.4) 960 (7)     Urine (mL/kg/hr)   1500 (0.5) 1000 (0.3) 1300 (0.4) 2300 (0.7) 1050 (0.3)     Stool   0 (0) 0 (0)  0 (0)      Blood     50 (0)       Total Output     1500 1000 1350 2300 1050      Net     -1500 -520 +643 -1436 -90                  Unmeasured Urine Occurrence  2 x 1 x         Unmeasured Stool Occurrence   1 x 1 x  1 x 1 x           Microbiology and Radiology  Microbiology Results (last 10 days)     Procedure Component Value - Date/Time    Blood Culture - Blood, [[id number]]  (Normal) Collected:  10/16/17 1337    Lab Status:  Preliminary result Specimen:  Blood from Arm, Right Updated:  10/17/17 0216     Blood Culture No growth at less than 24 hours    Blood Culture - Blood, [[id number]]  (Abnormal) Collected:  10/16/17 1337    Lab Status:  Preliminary result Specimen:  Blood from Arm, Right Updated:  10/17/17 1214     Blood Culture Abnormal Stain (A)     Gram Stain Result Aerobic Bottle Gram positive cocci in groups    Urine Culture - Urine, Urine, Catheter [[id number]]  (Normal) Collected:  10/14/17 1909    Lab Status:  Final result Specimen:  Urine from Urine, Catheter Updated:  10/16/17 0851     Urine Culture No growth at 2 days    Wound Culture - Wound, Coccyx [[id number]]  (Abnormal)  (Susceptibility) Collected:  10/11/17 2203    Lab Status:  Final result Specimen:  Wound from Coccyx Updated:  10/15/17 1351     Wound Culture --      Light growth (2+) Proteus mirabilis (A)      Light growth (2+) Enterococcus faecalis (A)      Scant growth (1+) Streptococcus, Alpha Hemolytic, Not S. pneumoniae or Group D (A)      Light growth (2+) Streptococcus, Beta Hemolytic, Group C (A)      This organism is considered to be universally susceptible to penicillin.  No further antibiotic testing will be performed.        STREP GROUPING  C     Gram Stain Result No WBCs seen      Few (2+) Gram negative bacilli      Occasional Gram positive cocci in pairs              Evaluation of Level      Assessment/Plan:  Patient being initiated on vancomycin for PNA, possible GPC bacteremia.     Will load patient with vancomycin 2000 mg IV x 1 dose, then begin vancomycin 1500 mg IV q24h.  Will check trough prior to 3rd total dose of vancomycin on 10/19 @ 1130.    Pharmacy will continue to follow.    Thank you for this consult,  Raegan Lima, PharmD  10/17/17  1:06 PM

## 2017-10-17 NOTE — PLAN OF CARE
Problem: Patient Care Overview (Adult)  Goal: Plan of Care Review  Outcome: Ongoing (interventions implemented as appropriate)    10/17/17 6564   Coping/Psychosocial Response Interventions   Plan Of Care Reviewed With patient   Patient Care Overview   Progress no change   Outcome Evaluation   Outcome Summary/Follow up Plan WOC nurse follow-up for for coccyx wound reevaluation and treatment. Wound bed is moist and mild slough noted. Cleaned wound and packed with moisten Hydrofera blue. Dressing changes to be done every other day and PRN with soiling. WOC nurse will continue to follow. Please contact WOC nurse if needs arise. Thanks

## 2017-10-17 NOTE — PLAN OF CARE
Problem: Patient Care Overview (Adult)  Goal: Plan of Care Review  Outcome: Ongoing (interventions implemented as appropriate)    10/17/17 1331   Coping/Psychosocial Response Interventions   Plan Of Care Reviewed With patient   Outcome Evaluation   Outcome Summary/Follow up Plan OT IE completed. Pt to EOB, OOB and walked to door way with assist x2. D/C plan is home with HH and friend to assist. OT to follow.         Problem: Inpatient Occupational Therapy  Goal: Transfer Training Goal 1 LTG- OT  Outcome: Ongoing (interventions implemented as appropriate)    10/17/17 1331   Transfer Training OT LTG   Transfer Training OT LTG, Time to Achieve by discharge   Transfer Training OT LTG, Activity Type sit to stand/stand to sit;toilet   Transfer Training OT LTG, Drew Level contact guard assist;verbal cues required   Transfer Training OT LTG, Assist Device commode, bedside;walker, rolling       Goal: Strength Goal LTG- OT  Outcome: Ongoing (interventions implemented as appropriate)    10/17/17 1331   Strength OT LTG   Strength Goal OT LTG, Time to Achieve by discharge   Strength Goal OT LTG, Functional Goal Pt able to complete B UE HEP x10-15 reps to support ADL preformance       Goal: Toileting Goal LTG- OT  Outcome: Ongoing (interventions implemented as appropriate)    10/17/17 1331   Toileting OT LTG   Toileting Goal OT LTG, Time to Achieve by discharge   Toileting Goal OT LTG, Drew Level set up required   Toileting Goal OT LTG, Additional Goal from bariatric BSC (recommended for home)       Goal: LB Dressing Goal LTG- OT  Outcome: Ongoing (interventions implemented as appropriate)    10/17/17 1331   LB Dressing OT LTG   LB Dressing Goal OT LTG, Time to Achieve by discharge   LB Dressing Goal OT LTG, Drew Level set up required   LB Dressing Goal OT LTG, Additional Goal Pt able to done L LE brace/shoe and R posthesis with set-up assist

## 2017-10-17 NOTE — PROGRESS NOTES
"Colon and Rectal [[facility]]    POD # 3    /57 (BP Location: Left arm, Patient Position: Lying)  Pulse 73  Temp 99.2 °F (37.3 °C) (Oral)   Resp 17  Ht 74\" (188 cm)  Wt (!) 301 lb 6 oz (137 kg)  SpO2 98%  BMI 38.69 kg/m2    Lab Results (last 24 hours)     Procedure Component Value Units Date/Time    POC Glucose Fingerstick [[id number]]  (Normal) Collected:  10/16/17 2035    Specimen:  Blood Updated:  10/16/17 2043     Glucose 111 mg/dL     Narrative:       Meter: [id number] : [id number] Hanna Enriquez    CBC & Differential [[id number]] Collected:  10/17/17 0554    Specimen:  Blood Updated:  10/17/17 0618    Narrative:       The following orders were created for panel order CBC & Differential.  Procedure                               Abnormality         Status                     ---------                               -----------         ------                     CBC Auto Differential[[id number]]        Abnormal            Final result                 Please view results for these tests on the individual orders.    CBC Auto Differential [[id number]]  (Abnormal) Collected:  10/17/17 0554    Specimen:  Blood Updated:  10/17/17 0618     WBC 8.88 10*3/mm3      RBC 3.08 (L) 10*6/mm3      Hemoglobin 8.7 (L) g/dL      Hematocrit 28.4 (L) %      MCV 92.2 fL      MCH 28.2 pg      MCHC 30.6 (L) g/dL      RDW 15.7 (H) %      RDW-SD 52.5 fl      MPV 9.7 fL      Platelets 234 10*3/mm3      Neutrophil % 79.2 (H) %      Lymphocyte % 10.2 (L) %      Monocyte % 10.0 %      Eosinophil % 0.3 %      Basophil % 0.2 %      Immature Grans % 0.1 %      Neutrophils, Absolute 7.02 10*3/mm3      Lymphocytes, Absolute 0.91 10*3/mm3      Monocytes, Absolute 0.89 10*3/mm3      Eosinophils, Absolute 0.03 10*3/mm3      Basophils, Absolute 0.02 10*3/mm3      Immature Grans, Absolute 0.01 10*3/mm3     Basic Metabolic Panel [[id number]]  (Abnormal) Collected:  10/17/17 0554    Specimen:  Blood Updated:  10/17/17 0638     Glucose 149 (H) mg/dL  "     BUN 15 mg/dL      Creatinine 1.20 mg/dL      Sodium 135 mmol/L      Potassium 3.9 mmol/L      Chloride 99 mmol/L      CO2 23.0 mmol/L      Calcium 8.7 mg/dL      eGFR Non African Amer 59 (L) mL/min/1.73      BUN/Creatinine Ratio 12.5     Anion Gap 13.0 (H) mmol/L     Narrative:       [employer] Guidelines    Stage     Description        GFR  1         Normal or High     90+  2         Mild decrease      60-89  3         Moderate decrease  30-59  4         Severe decrease    15-29  5         Kidney failure     <15    POC Glucose Fingerstick [[id number]]  (Abnormal) Collected:  10/17/17 0816    Specimen:  Blood Updated:  10/17/17 0818     Glucose 154 (H) mg/dL     Narrative:       Meter: [id number] : [id number] Edilma Roca    Blood Culture - Blood, [[id number]]  (Abnormal) Collected:  10/16/17 1337    Specimen:  Blood from Arm, Right Updated:  10/17/17 1214     Blood Culture Abnormal Stain (A)     Gram Stain Result Aerobic Bottle Gram positive cocci in groups    POC Glucose Fingerstick [[id number]]  (Abnormal) Collected:  10/17/17 1229    Specimen:  Blood Updated:  10/17/17 1231     Glucose 210 (H) mg/dL     Narrative:       Meter: [id number] : [id number] Edilma Roca    Blood Culture - Blood, [[id number]] Collected:  10/17/17 1326    Specimen:  Blood from Arm, Right Updated:  10/17/17 1341    Blood Culture - Blood, [[id number]] Collected:  10/17/17 1322    Specimen:  Blood from Arm, Left Updated:  10/17/17 1341    Blood Culture ID, PCR - Blood, [[id number]]  (Abnormal) Collected:  10/16/17 1337    Specimen:  Blood from Arm, Right Updated:  10/17/17 1355     BCID, PCR Staphylococcus spp, not aureus. Identification by BCID PCR. (C)    Blood Culture - Blood, [[id number]]  (Normal) Collected:  10/16/17 1337    Specimen:  Blood from Arm, Right Updated:  10/17/17 1416     Blood Culture No growth at 24 hours    Tissue Pathology Exam - Tissue, Large Intestine, Right / Ascending Colon [[id number]]  Collected:  10/14/17 0952    Specimen:  Tissue from Large Intestine, Right / Ascending Colon Updated:  10/17/17 1543     Case Report --     Surgical Pathology Report                         Case: [id number]                                  Authorizing Provider:  Keith Worthy MD        Collected:           10/14/2017 09:52 AM          Ordering Location:     [facility]   Received:            10/16/2017 07:12 AM                                 OR                                                                           Pathologist:           Jun Gusman MD                                                            Specimen:    Large Intestine, Right / Ascending Colon                                                    Clinical Information --     The working history is right colon cancer.        Final Diagnosis --     RIGHT/ASCENDING COLON, HEMICOLECTOMY:  Infiltrating moderately differentiated adenocarcinoma with partial mucinous differentiation.  Gross tumor size 5.9x3.6x1.8 cm.  Tumor invades into muscularis propria.  Margins free of neoplasm.  Fifteen lymph nodes negative for metastatic carcinoma.  See template      COLON AND RECTUM TEMPLATE:  TYPE OF SPECIMEN/PROCEDURE: Right hemicolectomy    SPECIMEN INTEGRITY:  Intact  TUMOR SITE:  Right colon  TUMOR SIZE (greatest dimension):  5.9 cm greatest dimension  MACROSCOPIC TUMOR PERFORATION: No   TUMOR CONFIGURATION:  Polypoid  HISTOLOGIC TYPE:  Adenocarcinoma   HISTOLOGIC GRADE:  Low grade   MICROSCOPIC DEPTH OF TUMOR INVASION/EXTENSION:  Muscularis propria   PERITONEAL INVOLVEMENT:  No   LYMPHVASCULAR INVASION:  No   PERINEURAL INVASION: No   SURGICAL MARGINS: Negative for neoplasm  STATUS AND DISTANCE FROM PROXIMAL MUCOSAL MARGIN:  Negative, 7.5 cm  STATUS AND DISTANCE FROM DISTAL MUCOSAL MARGIN: Negative, 6.3 cm  STATUS AND DISTANCE FROM MESENTERIC MARGIN: Negative, 8.5 cm  STATUS AND DISTANCE FROM RADIAL MARGIN: N/A   TUMOR DEPOSITS  (DISCONTINUOUS EXTRAMURAL EXTENSION):  No   NUMBER OF LYMPH NODES INVOLVED BY METASTATIC NEOPLASM: 0  NUMBER OF REGIONAL LYMPH NODES EXAMINED: 15  EXTRANODAL EXTENSION:  N/A   TREATMENT EFFECT:  No   ADDITIONAL PATHOLOGIC FINDINGS:  Pseudo-lipomatous hypertrophy of ileocecal valve; fibrous obliteration of appendix   MSI TESTING (under age 60):  Available upon request  OTHER ANCILLARY STUDIES (BRAF, KRAS, ETC.):  Available upon request  AJCC PATHOLOGIC STAGE:  (COMPLETED BY PATHOLOGIST, BASED ONLY ON TISSUE FINDINGS, MORE EXTENSIVE DISEASE MAY NOT BE KNOWN TO THE PATHOLOGIST)  pT=  2  pN=  0   AJCC PATHOLOGIC STAGE:  I      [place] [place]ross Description --     Received in formalin labeled right/ascending colon is a right colectomy specimen consisting of a 4 cm long by 3.5 cm in circumference portion of terminal ileum, ileocecal valve, cecum, appendix, and a 10.5 cm long by 8.5 cm circumference portion of ascending colon. This specimen is received with the margin stapled end to end. The serosa is tan/gray and smooth. There is a 5.9x3.6x1.8 cm firm tan/brown polypoid mass that is 7.5 cm from the proximal margin, 6.3 cm from the distal margin, and 8.5 cm from the mesenteric margin. The mass is suspicious for invasion of the muscularis, but does not grossly appear to invade the underlying fat. The mass occupies approximately 70% of the luminal circumference. The remainder of the ileal and colonic mucosa is tan with a normal folding pattern. No other masses or gross lesions are identified. The bowel wall thickness averages 0.2 cm. The attached appendix is 6.5 cm long by 0.5 cm in diameter. The serosa is tan glistening and finely vascular with mild adhesions. Sectioning reveals grossly unremarkable mucosa. The lumen is void of contents. The wall thickness averages 0.2 cm. Sixteen lymph node candidates are identified in the mesenteric fat ranging from 0.2x0.2x0.2 cm to 1.0x0.5x0.5 cm. Representative sections are  submitted as follows: 1A - proximal margin en face; 1B-1C - distal margin en face; 1D - mesenteric margin en face; 1E-1H - tumor (1E deepest gross invasion); 1I - ileocecal valve; 1J - appendix; 1K - six lymph node candidates; 1L - three lymph node candidates; 1M - three bisected lymph node candidates differentially inked; 1N - four bisected lymph node candidates differentially inked. LED/[facility] [place]        Microscopic Description --     Sections from proximal, distal, and mesenteric margins are free of neoplasm.  The mesenteric margin includes a single lymph node which is negative for metastatic carcinoma.  Sections of the entirely submitted neoplasm show an invasive neoplasm composed of complex glandular structures lined by cells with enlarged vesicular nucleoli with nucleoli and displaying mitotic activity.  There are pools of mucin.  The tumor invades into the muscular layer, but does not penetrate through it.  Neither angiolymphatic nor perineural invasion by neoplasm are identified.  The mucinous component comprises approximately 40% of the tumor.  Areas of Chron's-like reaction are seen (reaction to tumor only).  The ileocecal valve shows pseudolipomatous hypertrophy.  Sections of the appendix show fibrous obliteration of the distal lumen.  There are some serosal adhesions.  Sections through 14 additional lymph nodes show no evidence of metastatic carcinoma.        Embedded Images --        Symptomatically, pneumonia improved.  Diarrhea as expected.  Abdomen benign  Pathology stage I.  No chemotherapy needed.  Home when okay with medicine but probably will be a couple days because of his body habitus and ambulation issues.         Order Name Source Comment Collection Info Order Time   TISSUE PATHOLOGY EXAM Large Intestine, Right / Ascending Colon  Collected By: Keith Worthy MD 10/14/2017  9:57 AM   .    Keith Worthy MD  10/17/17  4:58 PM

## 2017-10-18 LAB
ANION GAP SERPL CALCULATED.3IONS-SCNC: 6 MMOL/L (ref 3–11)
BASOPHILS # BLD AUTO: 0.01 10*3/MM3 (ref 0–0.2)
BASOPHILS NFR BLD AUTO: 0.1 % (ref 0–1)
BUN BLD-MCNC: 14 MG/DL (ref 9–23)
BUN/CREAT SERPL: 14 (ref 7–25)
CALCIUM SPEC-SCNC: 8.6 MG/DL (ref 8.7–10.4)
CHLORIDE SERPL-SCNC: 99 MMOL/L (ref 99–109)
CO2 SERPL-SCNC: 28 MMOL/L (ref 20–31)
CREAT BLD-MCNC: 1 MG/DL (ref 0.6–1.3)
DEPRECATED RDW RBC AUTO: 49.9 FL (ref 37–54)
EOSINOPHIL # BLD AUTO: 0.13 10*3/MM3 (ref 0–0.3)
EOSINOPHIL NFR BLD AUTO: 1.8 % (ref 0–3)
ERYTHROCYTE [DISTWIDTH] IN BLOOD BY AUTOMATED COUNT: 15.4 % (ref 11.3–14.5)
FERRITIN SERPL-MCNC: 125 NG/ML (ref 22–322)
FOLATE SERPL-MCNC: 16.62 NG/ML (ref 3.2–20)
GFR SERPL CREATININE-BSD FRML MDRD: 72 ML/MIN/1.73
GLUCOSE BLD-MCNC: 138 MG/DL (ref 70–100)
GLUCOSE BLDC GLUCOMTR-MCNC: 161 MG/DL (ref 70–130)
GLUCOSE BLDC GLUCOMTR-MCNC: 177 MG/DL (ref 70–130)
GLUCOSE BLDC GLUCOMTR-MCNC: 178 MG/DL (ref 70–130)
GLUCOSE BLDC GLUCOMTR-MCNC: 197 MG/DL (ref 70–130)
HCT VFR BLD AUTO: 27.7 % (ref 38.9–50.9)
HGB BLD-MCNC: 8.8 G/DL (ref 13.1–17.5)
IMM GRANULOCYTES # BLD: 0.01 10*3/MM3 (ref 0–0.03)
IMM GRANULOCYTES NFR BLD: 0.1 % (ref 0–0.6)
IRON 24H UR-MRATE: 8 MCG/DL (ref 50–175)
IRON SATN MFR SERPL: 2 % (ref 20–50)
LYMPHOCYTES # BLD AUTO: 0.87 10*3/MM3 (ref 0.6–4.8)
LYMPHOCYTES NFR BLD AUTO: 12.1 % (ref 24–44)
MCH RBC QN AUTO: 28.6 PG (ref 27–31)
MCHC RBC AUTO-ENTMCNC: 31.8 G/DL (ref 32–36)
MCV RBC AUTO: 89.9 FL (ref 80–99)
MONOCYTES # BLD AUTO: 0.89 10*3/MM3 (ref 0–1)
MONOCYTES NFR BLD AUTO: 12.4 % (ref 0–12)
NEUTROPHILS # BLD AUTO: 5.29 10*3/MM3 (ref 1.5–8.3)
NEUTROPHILS NFR BLD AUTO: 73.5 % (ref 41–71)
PLATELET # BLD AUTO: 244 10*3/MM3 (ref 150–450)
PMV BLD AUTO: 10.1 FL (ref 6–12)
POTASSIUM BLD-SCNC: 3.3 MMOL/L (ref 3.5–5.5)
RBC # BLD AUTO: 3.08 10*6/MM3 (ref 4.2–5.76)
RETICS/RBC NFR AUTO: 1.57 % (ref 0.5–1.5)
SODIUM BLD-SCNC: 133 MMOL/L (ref 132–146)
TIBC SERPL-MCNC: 365 MCG/DL (ref 250–450)
VIT B12 BLD-MCNC: 1949 PG/ML (ref 211–911)
WBC NRBC COR # BLD: 7.2 10*3/MM3 (ref 3.5–10.8)

## 2017-10-18 PROCEDURE — 25010000002 PIPERACILLIN SOD-TAZOBACTAM PER 1 G: Performed by: NURSE PRACTITIONER

## 2017-10-18 PROCEDURE — 85025 COMPLETE CBC W/AUTO DIFF WBC: CPT | Performed by: INTERNAL MEDICINE

## 2017-10-18 PROCEDURE — 87449 NOS EACH ORGANISM AG IA: CPT | Performed by: NURSE PRACTITIONER

## 2017-10-18 PROCEDURE — 82728 ASSAY OF FERRITIN: CPT | Performed by: INTERNAL MEDICINE

## 2017-10-18 PROCEDURE — 83550 IRON BINDING TEST: CPT | Performed by: INTERNAL MEDICINE

## 2017-10-18 PROCEDURE — 25010000002 VANCOMYCIN 10 G RECONSTITUTED SOLUTION

## 2017-10-18 PROCEDURE — 82746 ASSAY OF FOLIC ACID SERUM: CPT | Performed by: INTERNAL MEDICINE

## 2017-10-18 PROCEDURE — 82962 GLUCOSE BLOOD TEST: CPT

## 2017-10-18 PROCEDURE — 97116 GAIT TRAINING THERAPY: CPT

## 2017-10-18 PROCEDURE — 87899 AGENT NOS ASSAY W/OPTIC: CPT | Performed by: NURSE PRACTITIONER

## 2017-10-18 PROCEDURE — 87493 C DIFF AMPLIFIED PROBE: CPT | Performed by: COLON & RECTAL SURGERY

## 2017-10-18 PROCEDURE — 82607 VITAMIN B-12: CPT | Performed by: INTERNAL MEDICINE

## 2017-10-18 PROCEDURE — 83540 ASSAY OF IRON: CPT | Performed by: INTERNAL MEDICINE

## 2017-10-18 PROCEDURE — 25010000002 HEPARIN (PORCINE) PER 1000 UNITS: Performed by: NURSE PRACTITIONER

## 2017-10-18 PROCEDURE — 97530 THERAPEUTIC ACTIVITIES: CPT | Performed by: OCCUPATIONAL THERAPIST

## 2017-10-18 PROCEDURE — 80048 BASIC METABOLIC PNL TOTAL CA: CPT | Performed by: INTERNAL MEDICINE

## 2017-10-18 PROCEDURE — 99232 SBSQ HOSP IP/OBS MODERATE 35: CPT | Performed by: NURSE PRACTITIONER

## 2017-10-18 PROCEDURE — 85045 AUTOMATED RETICULOCYTE COUNT: CPT | Performed by: INTERNAL MEDICINE

## 2017-10-18 RX ORDER — POTASSIUM CHLORIDE 1.5 G/1.77G
40 POWDER, FOR SOLUTION ORAL AS NEEDED
Status: DISCONTINUED | OUTPATIENT
Start: 2017-10-18 | End: 2017-10-20 | Stop reason: HOSPADM

## 2017-10-18 RX ORDER — FERROUS SULFATE 325(65) MG
325 TABLET ORAL 2 TIMES DAILY WITH MEALS
Status: DISCONTINUED | OUTPATIENT
Start: 2017-10-18 | End: 2017-10-20 | Stop reason: HOSPADM

## 2017-10-18 RX ORDER — ASCORBIC ACID 500 MG
500 TABLET ORAL 2 TIMES DAILY
Status: DISCONTINUED | OUTPATIENT
Start: 2017-10-18 | End: 2017-10-20 | Stop reason: HOSPADM

## 2017-10-18 RX ORDER — POTASSIUM CHLORIDE 750 MG/1
40 CAPSULE, EXTENDED RELEASE ORAL AS NEEDED
Status: DISCONTINUED | OUTPATIENT
Start: 2017-10-18 | End: 2017-10-20 | Stop reason: HOSPADM

## 2017-10-18 RX ORDER — CALCIUM CARBONATE 200(500)MG
1 TABLET,CHEWABLE ORAL DAILY PRN
Status: DISCONTINUED | OUTPATIENT
Start: 2017-10-18 | End: 2017-10-20 | Stop reason: HOSPADM

## 2017-10-18 RX ADMIN — TAZOBACTAM SODIUM AND PIPERACILLIN SODIUM 4.5 G: 500; 4 INJECTION, SOLUTION INTRAVENOUS at 05:13

## 2017-10-18 RX ADMIN — METOPROLOL TARTRATE 12.5 MG: 25 TABLET, FILM COATED ORAL at 21:32

## 2017-10-18 RX ADMIN — INSULIN LISPRO 2 UNITS: 100 INJECTION, SOLUTION INTRAVENOUS; SUBCUTANEOUS at 13:04

## 2017-10-18 RX ADMIN — TAZOBACTAM SODIUM AND PIPERACILLIN SODIUM 4.5 G: 500; 4 INJECTION, SOLUTION INTRAVENOUS at 21:56

## 2017-10-18 RX ADMIN — VANCOMYCIN HYDROCHLORIDE 1500 MG: 10 INJECTION, POWDER, LYOPHILIZED, FOR SOLUTION INTRAVENOUS at 11:07

## 2017-10-18 RX ADMIN — ACETAMINOPHEN 1000 MG: 500 TABLET ORAL at 16:25

## 2017-10-18 RX ADMIN — Medication 325 MG: at 17:23

## 2017-10-18 RX ADMIN — POTASSIUM CHLORIDE 40 MEQ: 750 CAPSULE, EXTENDED RELEASE ORAL at 11:11

## 2017-10-18 RX ADMIN — TAZOBACTAM SODIUM AND PIPERACILLIN SODIUM 4.5 G: 500; 4 INJECTION, SOLUTION INTRAVENOUS at 14:37

## 2017-10-18 RX ADMIN — HEPARIN SODIUM 5000 UNITS: 5000 INJECTION, SOLUTION INTRAVENOUS; SUBCUTANEOUS at 21:32

## 2017-10-18 RX ADMIN — LEVOTHYROXINE SODIUM 75 MCG: 75 TABLET ORAL at 06:28

## 2017-10-18 RX ADMIN — OXYCODONE HYDROCHLORIDE AND ACETAMINOPHEN 500 MG: 500 TABLET ORAL at 17:23

## 2017-10-18 RX ADMIN — HEPARIN SODIUM 5000 UNITS: 5000 INJECTION, SOLUTION INTRAVENOUS; SUBCUTANEOUS at 08:18

## 2017-10-18 RX ADMIN — METOPROLOL TARTRATE 12.5 MG: 25 TABLET, FILM COATED ORAL at 08:17

## 2017-10-18 RX ADMIN — INSULIN LISPRO 2 UNITS: 100 INJECTION, SOLUTION INTRAVENOUS; SUBCUTANEOUS at 08:18

## 2017-10-18 RX ADMIN — POTASSIUM CHLORIDE 40 MEQ: 750 CAPSULE, EXTENDED RELEASE ORAL at 16:25

## 2017-10-18 RX ADMIN — INSULIN LISPRO 2 UNITS: 100 INJECTION, SOLUTION INTRAVENOUS; SUBCUTANEOUS at 17:26

## 2017-10-18 RX ADMIN — ACETAMINOPHEN 1000 MG: 500 TABLET ORAL at 21:32

## 2017-10-18 RX ADMIN — VALSARTAN 160 MG: 160 TABLET, FILM COATED ORAL at 08:18

## 2017-10-18 RX ADMIN — ACETAMINOPHEN 1000 MG: 500 TABLET ORAL at 08:17

## 2017-10-18 NOTE — THERAPY TREATMENT NOTE
Acute Care - Physical Therapy Treatment Note  [facility]     Patient Name: Jonah Hannon  : 1940  MRN: [id number]  Today's Date: 10/18/2017  Onset of Illness/Injury or Date of Surgery Date: 10/11/17  Date of Referral to PT: 10/16/17  Referring Physician: AUSTIN Poe    Admit Date: 10/11/2017    Visit Dx:    ICD-10-CM ICD-9-CM   1. Pharyngeal dysphagia R13.13 787.23   2. Colon cancer C18.9 153.9   3. Impaired mobility and ADLs Z74.09 799.89   4. Impaired functional mobility, balance, gait, and endurance Z74.09 V49.89     Patient Active Problem List   Diagnosis   • Iron deficiency anemia due to chronic blood loss   • Colon cancer   • Acquired hypothyroidism   • Type 2 diabetes mellitus with circulatory disorder   • Essential hypertension   • MARISOL (obstructive sleep apnea)   • Sacral wound   • S/P right hemicolectomy   • CAD in native artery-noncritical by [facility] 10/12/17   • Acute respiratory failure with hypoxia   • Pneumonia               Adult Rehabilitation Note       10/18/17 0829          Rehab Assessment/Intervention    Discipline physical therapy assistant  -AS      Document Type therapy note (daily note)  -AS      Subjective Information agree to therapy;complains of;weakness  -AS      Patient Effort, Rehab Treatment good  -AS      Symptoms Noted During/After Treatment fatigue  -AS      Recorded by [AS] Zoe Yanes PTA      Pain Assessment    Pain Assessment No/denies pain  -AS      Pain Score 0  -AS      Post Pain Score 0  -AS      Recorded by [AS] Zoe Yanes PTA      Cognitive Assessment/Intervention    Current Cognitive/Communication Assessment functional  -AS      Orientation Status oriented x 4  -AS      Follows Commands/Answers Questions 100% of the time;able to follow single-step instructions  -AS      Personal Safety WNL/WFL  -AS      Personal Safety Interventions fall prevention program maintained;gait belt;nonskid shoes/slippers when out of bed;other (see comments)   KING  prosthesis, L LE brace/shoe  -AS      Recorded by [AS] Zoe Yanes PTA      Bed Mobility, Assessment/Treatment    Bed Mob, Supine to Sit, Wishon supervision required  -AS      Bed Mob, Sit to Supine, Wishon supervision required  -AS      Bed Mobility, Impairments strength decreased  -AS      Bed Mobility, Comment good technique  -AS      Recorded by [AS] Zoe Yanes PTA      Transfer Assessment/Treatment    Transfers, Sit-Stand Wishon verbal cues required;minimum assist (75% patient effort);2 person assist required  -AS      Transfers, Stand-Sit [place] verbal cues required;minimum assist (75% patient effort);2 person assist required  -AS      Transfers, Sit-Stand-Sit, Assist Device rolling walker  -AS      Transfer, Safety Issues weight-shifting ability decreased  -AS      Transfer, Impairments strength decreased;impaired balance  -AS      Transfer, Comment verbal cues to keep walker close and for posture. Limited by weakness.  -AS      Recorded by [AS] Zoe Yanes PTA      Gait Assessment/Treatment    Gait, Wishon Level verbal cues required;minimum assist (75% patient effort);2 person assist required  -AS      Gait, Assistive Device rolling walker  -AS      Gait, Distance (Feet) 32  -AS      Gait, Gait Deviations huang decreased;forward flexed posture;step length decreased  -AS      Gait, Safety Issues step length decreased;weight-shifting ability decreased  -AS      Gait, Impairments strength decreased;impaired balance  -AS      Gait, Comment verbal cues to stay inside walker and for posture.  -AS      Recorded by [AS] Zoe Yanes PTA      Positioning and Restraints    Pre-Treatment Position in bed  -AS      Post Treatment Position bed  -AS      In Bed supine;call light within reach;encouraged to call for assist;exit alarm on  -AS      Recorded by [AS] Zoe Yanes PTA        User Key  (r) = Recorded By, (t) = Taken By, (c) = Cosigned By     Initials Name Effective Dates    AS Zoe Yanes PTA 06/22/15 -                 IP PT Goals       10/18/17 0946 10/17/17 1108       Bed Mobility PT LTG    Bed Mobility PT LTG, Date Established  10/17/17  -KM     Bed Mobility PT LTG, Time to Achieve  2 wks  -KM     Bed Mobility PT LTG, Activity Type  all bed mobility  -KM     Bed Mobility PT LTG, [place] Level  independent  -KM     Bed Mobility PT LTG, Date Goal Reviewed 10/18/17  -AS      Bed Mobility PT LTG, Outcome goal ongoing  -AS      Transfer Training PT LTG    Transfer Training PT LTG, Date Established  10/17/17  -KM     Transfer Training PT LTG, Time to Achieve  2 wks  -KM     Transfer Training PT LTG, Activity Type  bed to chair /chair to bed;sit to stand/stand to sit  -KM     Transfer Training PT LTG, [place] Level  independent  -KM     Transfer Training PT LTG, Assist Device  walker, rolling  -KM     Transfer Training PT  LTG, Date Goal Reviewed 10/18/17  -AS      Transfer Training PT LTG, Outcome goal ongoing  -AS      Gait Training PT LTG    Gait Training Goal PT LTG, Date Established  10/17/17  -KM     Gait Training Goal PT LTG, Time to Achieve  2 wks  -KM     Gait Training Goal PT LTG, [place] Level  independent  -KM     Gait Training Goal PT LTG, Assist Device  walker, rolling  -KM     Gait Training Goal PT LTG, Distance to Achieve  40  -KM     Gait Training Goal PT LTG, Date Goal Reviewed 10/18/17  -AS      Gait Training Goal PT LTG, Outcome goal ongoing  -AS        User Key  (r) = Recorded By, (t) = Taken By, (c) = Cosigned By    Initials Name Provider Type    ANDRY Fitch, PT Physical Therapist    AS Zoe Yanes, FLYNN Physical Therapy Assistant          Physical Therapy Education     Title: PT OT SLP Therapies (Active)     Topic: Physical Therapy (Active)     Point: Mobility training (Active)    Learning Progress Summary    Learner Readiness Method Response Comment Documented by Status   Patient Acceptance E  NR  AS 10/18/17 0946 Active    Acceptance E VU discussed plan of care and d/c planning KM 10/17/17 1114 Done               Point: Home exercise program (Active)    Learning Progress Summary    Learner Readiness Method Response Comment Documented by Status   Patient Acceptance E NR  AS 10/18/17 0946 Active    Acceptance E VU discussed plan of care and d/c planning KM 10/17/17 1114 Done               Point: Body mechanics (Active)    Learning Progress Summary    Learner Readiness Method Response Comment Documented by Status   Patient Acceptance E NR  AS 10/18/17 0946 Active    Acceptance E VU discussed plan of care and d/c planning KM 10/17/17 1114 Done               Point: Precautions (Active)    Learning Progress Summary    Learner Readiness Method Response Comment Documented by Status   Patient Acceptance E NR  AS 10/18/17 0946 Active    Acceptance E VU discussed plan of care and d/c planning KM 10/17/17 1114 Done                      User Key     Initials Effective Dates Name Provider Type Discipline    KM 06/19/15 -  Angela Fitch, PT Physical Therapist PT    AS 06/22/15 -  Zoe Yanes, FLYNN Physical Therapy Assistant PT                    PT Recommendation and Plan  Anticipated Discharge Disposition: home with assist, home with home health  PT Frequency: daily  Plan of Care Review  Plan Of Care Reviewed With: patient  Progress: progress toward functional goals as expected  Outcome Summary/Follow up Plan: patient needs assist to jarred prosthesis and left leg brace, verbal cues for safe use of walker and posture.          Outcome Measures       10/18/17 0829 10/17/17 0949 10/17/17 0948    How much help from another person do you currently need...    Turning from your back to your side while in flat bed without using bedrails? 3  -AS  3  -KM    Moving from lying on back to sitting on the side of a flat bed without bedrails? 3  -AS  3  -KM    Moving to and from a bed to a chair (including a wheelchair)?  3  -AS  2  -KM    Standing up from a chair using your arms (e.g., wheelchair, bedside chair)? 3  -AS  2  -KM    Climbing 3-5 steps with a railing? 1  -AS  1  -KM    To walk in hospital room? 3  -AS  2  -KM    AM-PAC 6 Clicks Score 16  -AS  13  -KM    How much help from another is currently needed...    Putting on and taking off regular lower body clothing?  2  -TB     Bathing (including washing, rinsing, and drying)  2  -TB     Toileting (which includes using toilet bed pan or urinal)  2  -TB     Putting on and taking off regular upper body clothing  3  -TB     Taking care of personal grooming (such as brushing teeth)  3  -TB     Eating meals  3  -TB     Score  15  -TB     Functional Assessment    Outcome Measure Options AM-PAC 6 Clicks Basic Mobility (PT)  -AS AM-PAC 6 Clicks Daily Activity (OT)  -TB AM-PAC 6 Clicks Basic Mobility (PT)  -KM      User Key  (r) = Recorded By, (t) = Taken By, (c) = Cosigned By    Initials Name Provider Type    TB Janeen De Leon, OT Occupational Therapist    KM Angela Fitch, PT Physical Therapist    AS Zoe Yanes PTA Physical Therapy Assistant           Time Calculation:         PT Charges       10/18/17 0949          Time Calculation    Start Time 0829  -AS      PT Received On 10/18/17  -AS      PT Goal Re-Cert Due Date 10/18/17  -AS      Time Calculation- PT    Total Timed Code Minutes- PT 20 minute(s)  -AS        User Key  (r) = Recorded By, (t) = Taken By, (c) = Cosigned By    Initials Name Provider Type    AS Zoe Yanes PTA Physical Therapy Assistant          Therapy Charges for Today     Code Description Service Date Service Provider Modifiers Qty    [id number] HC GAIT TRAINING EA 15 MIN 10/18/2017 Zoe Yanes PTA GP 1          PT G-Codes  Outcome Measure Options: AM-PAC 6 Clicks Basic Mobility (PT)    Zoe Yanes PTA  10/18/2017

## 2017-10-18 NOTE — PROGRESS NOTES
Improved breathing without the severe cough anymore.  Stage I cancer no chemotherapy needed.  By mouth improved.  Abdomen benign.  3 loose stools.  We'll make sure this is not C. difficile.  Home any time if negative.

## 2017-10-18 NOTE — PLAN OF CARE
Problem: Patient Care Overview (Adult)  Goal: Plan of Care Review  Outcome: Ongoing (interventions implemented as appropriate)    10/18/17 0946   Coping/Psychosocial Response Interventions   Plan Of Care Reviewed With patient   Patient Care Overview   Progress progress toward functional goals as expected   Outcome Evaluation   Outcome Summary/Follow up Plan patient needs assist to jarred prosthesis and left leg brace, verbal cues for safe use of walker and posture.         Problem: Inpatient Physical Therapy  Goal: Bed Mobility Goal LTG- PT  Outcome: Ongoing (interventions implemented as appropriate)  Goal: Transfer Training Goal 1 LTG- PT  Outcome: Ongoing (interventions implemented as appropriate)    10/17/17 1108 10/18/17 0946   Transfer Training PT LTG   Transfer Training PT LTG, Date Established 10/17/17 --    Transfer Training PT LTG, Time to Achieve 2 wks --    Transfer Training PT LTG, Activity Type bed to chair /chair to bed;sit to stand/stand to sit --    Transfer Training PT LTG, [place] Level independent --    Transfer Training PT LTG, Assist Device walker, rolling --    Transfer Training PT LTG, Date Goal Reviewed --  10/18/17   Transfer Training PT LTG, Outcome --  goal ongoing       Goal: Gait Training Goal LTG- PT  Outcome: Ongoing (interventions implemented as appropriate)    10/17/17 1108 10/18/17 0946   Gait Training PT LTG   Gait Training Goal PT LTG, Date Established 10/17/17 --    Gait Training Goal PT LTG, Time to Achieve 2 wks --    Gait Training Goal PT LTG, [place] Level independent --    Gait Training Goal PT LTG, Assist Device walker, rolling --    Gait Training Goal PT LTG, Distance to Achieve 40 --    Gait Training Goal PT LTG, Date Goal Reviewed --  10/18/17   Gait Training Goal PT LTG, Outcome --  goal ongoing

## 2017-10-18 NOTE — PROGRESS NOTES
Adult Nutrition  Assessment/PES    Patient Name:  Jonah Hannon  YOB: 1940  MRN: [id number]  Admit Date:  10/11/2017    Assessment Date:  10/18/2017    Comments:            Reason for Assessment       10/18/17 0756    Reason for Assessment    Reason For Assessment/Visit follow up protocol    Time Spent (min) 5                              Problem/Interventions:        Problem 1       10/18/17 0756    Nutrition Diagnoses Problem 1    Problem 1 Nutrition Appropriate for Condition at this Time    Signs/Symptoms (evidenced by) PO Intake                          Education/Evaluation       10/18/17 0756    Monitor/Evaluation    Monitor Per protocol        Electronically signed by:  Catherine Culp RD  10/18/17 7:56 AM

## 2017-10-18 NOTE — SIGNIFICANT NOTE
10/18/17 1553   SLP Deferred Reason   SLP Deferred Reason Routine  (Attempted to see pt for tx, pt sleeping in room. ST provided pink asp precaution sign and reminded RN of no straws restriction (straws in pt's room). SLP to initiate tx tomorrow. )

## 2017-10-18 NOTE — THERAPY TREATMENT NOTE
Acute Care - Occupational Therapy Treatment Note  [facility]     Patient Name: Jonah Hannon  : 1940  MRN: [id number]  Today's Date: 10/18/2017  Onset of Illness/Injury or Date of Surgery Date: 10/11/17  Date of Referral to OT: 10/16/17  Referring Physician: AUSTIN Poe      Admit Date: 10/11/2017    Visit Dx:     ICD-10-CM ICD-9-CM   1. Pharyngeal dysphagia R13.13 787.23   2. Colon cancer C18.9 153.9   3. Impaired mobility and ADLs Z74.09 799.89   4. Impaired functional mobility, balance, gait, and endurance Z74.09 V49.89     Patient Active Problem List   Diagnosis   • Iron deficiency anemia due to chronic blood loss   • Colon cancer   • Acquired hypothyroidism   • Type 2 diabetes mellitus with circulatory disorder   • Essential hypertension   • MARISOL (obstructive sleep apnea)   • Sacral wound   • S/P right hemicolectomy   • CAD in native artery-noncritical by [facility] 10/12/17   • Acute respiratory failure with hypoxia   • Pneumonia             Adult Rehabilitation Note       10/18/17 0839 10/18/17 0829       Rehab Assessment/Intervention    Discipline occupational therapist  -AR physical therapy assistant  -AS     Document Type therapy note (daily note)  -AR therapy note (daily note)  -AS     Subjective Information agree to therapy;complains of;weakness;pain  -AR agree to therapy;complains of;weakness  -AS     Patient Effort, Rehab Treatment good  -AR good  -AS     Symptoms Noted During/After Treatment fatigue  -AR fatigue  -AS     Precautions/Limitations fall precautions;other (see comments)   R BKA with prosthesis, LLE hinged brace w/shoe  -AR      Equipment Issued to Patient --   pt has reacher, declined other AE items  -AR      Recorded by [AR] Rosario Jessica, OT [AS] Zoe Yanes, FLYNN     Pain Assessment    Pain Assessment 0-10  -AR No/denies pain  -AS     Pain Score 7  -AR 0  -AS     Post Pain Score 7  -AR 0  -AS     Pain Type Chronic pain  -AR      Pain Location Generalized  -AR      Pain  Intervention(s) Repositioned;Ambulation/increased activity  -AR      Response to Interventions tolerated  -AR      Recorded by [AR] Rosario Jessica OT [AS] Zoe Yanes PTA     Cognitive Assessment/Intervention    Current Cognitive/Communication Assessment functional  -AR functional  -AS     Orientation Status oriented x 4  -AR oriented x 4  -AS     Follows Commands/Answers Questions 100% of the time;able to follow multi-step instructions  -% of the time;able to follow single-step instructions  -AS     Personal Safety WNL/WFL  -AR WNL/WFL  -AS     Personal Safety Interventions fall prevention program maintained  -AR fall prevention program maintained;gait belt;nonskid shoes/slippers when out of bed;other (see comments)   R prosthesis, L LE brace/shoe  -AS     Recorded by [AR] Rosario Jessica OT [AS] Zoe Yanes PTA     Bed Mobility, Assessment/Treatment    Bed Mobility, Assistive Device bed rails;head of bed elevated  -AR      Bed Mobility, Scoot/Bridge, [place] supervision required  -AR      Bed Mob, Supine to Sit, [place] supervision required  -AR supervision required  -AS     Bed Mob, Sit to Supine, [place] supervision required  -AR supervision required  -AS     Bed Mobility, Impairments strength decreased  -AR strength decreased  -AS     Bed Mobility, Comment  good technique  -AS     Recorded by [AR] Rosario Jessica OT [AS] Zoe Yanes PTA     Transfer Assessment/Treatment    Transfers, Sit-Stand [place] verbal cues required;minimum assist (75% patient effort);2 person assist required  -AR verbal cues required;minimum assist (75% patient effort);2 person assist required  -AS     Transfers, Stand-Sit [place] verbal cues required;minimum assist (75% patient effort);2 person assist required  -AR verbal cues required;minimum assist (75% patient effort);2 person assist required  -AS     Transfers, Sit-Stand-Sit, Assist Device rolling walker  -AR  rolling walker  -AS     Transfer, Safety Issues  weight-shifting ability decreased  -AS     Transfer, Impairments strength decreased;impaired balance  -AR strength decreased;impaired balance  -AS     Transfer, Comment cues for hand palcement and bed approach  -AR verbal cues to keep walker close and for posture. Limited by weakness.  -AS     Recorded by [AR] Rosario Jessica OT [AS] Zoe Yanes PTA     Gait Assessment/Treatment    Gait, [place] Level  verbal cues required;minimum assist (75% patient effort);2 person assist required  -AS     Gait, Assistive Device  rolling walker  -AS     Gait, Distance (Feet)  32  -AS     Gait, Gait Deviations  huang decreased;forward flexed posture;step length decreased  -AS     Gait, Safety Issues  step length decreased;weight-shifting ability decreased  -AS     Gait, Impairments  strength decreased;impaired balance  -AS     Gait, Comment  verbal cues to stay inside walker and for posture.  -AS     Recorded by  [AS] Zoe Yanes PTA     Upper Body Dressing Assessment/Training    UB Dressing Assess/Train, Clothing Type doffing:;donning:;hospital gown  -AR      UB Dressing Assess/Train, Position sitting  -AR      UB Dressing Assess/Train, [place] minimum assist (75% patient effort);verbal cues required  -AR      Recorded by [AR] Rosraio Jessica OT      Lower Body Dressing Assessment/Training    LB Dressing Assess/Train, Clothing Type donning:;doffing:   RLE prosthesis, LLE hinged brace  -AR      LB Dressing Assess/Train, Assist Device --   pt declined LH shoe horn   -AR      LB Dressing Assess/Train, Position edge of bed  -AR      LB Dressing Assess/Train, [place] maximum assist (25% patient effort);minimum assist (75% patient effort)   min assist RLE prosthesis, LLE hinged brace  -AR      LB Dressing Assess/Train, Impairments impaired balance;strength decreased;motor control impaired  -AR      LB Dressing Assess/Train, Comment Pt declined AE,  he required min assist for prosthesis, extensive assist to don LLE hinged brace and able to tell OT how to don/doff  -AR      Recorded by [AR] Rosario Jessica OT      Positioning and Restraints    Pre-Treatment Position in bed  -AR in bed  -AS     Post Treatment Position bed  -AR bed  -AS     In Bed supine;call light within reach;encouraged to call for assist;exit alarm on  -AR supine;call light within reach;encouraged to call for assist;exit alarm on  -AS     Recorded by [AR] Rosario Jessica, OT [AS] Zoe Yanes PTA       User Key  (r) = Recorded By, (t) = Taken By, (c) = Cosigned By    Initials Name Effective Dates    AR Rosario Jessica OT 06/22/15 -     AS Zoe Yanes, FLYNN 06/22/15 -                 OT Goals       10/18/17 1049 10/17/17 1331       Transfer Training OT LTG    Transfer Training OT LTG, Time to Achieve  by discharge  -TB     Transfer Training OT LTG, Activity Type  sit to stand/stand to sit;toilet  -TB     Transfer Training OT LTG, [place] Level  contact guard assist;verbal cues required  -TB     Transfer Training OT LTG, Assist Device  commode, bedside;walker, rolling  -TB     Transfer Training OT LTG, Outcome goal ongoing  -AR      Strength OT LTG    Strength Goal OT LTG, Time to Achieve  by discharge  -TB     Strength Goal OT LTG, Functional Goal  Pt able to complete B UE HEP x10-15 reps to support ADL preformance  -TB     Strength Goal OT LTG, Outcome goal ongoing  -AR      Toileting OT LTG    Toileting Goal OT LTG, Time to Achieve  by discharge  -TB     Toileting Goal OT LTG, [place] Level  set up required  -TB     Toileting Goal OT LTG, Additional Goal  from bariatric BSC (recommended for home)  -TB     Toileting Goal OT LTG, Outcome goal ongoing  -AR      LB Dressing OT LTG    LB Dressing Goal OT LTG, Time to Achieve  by discharge  -TB     LB Dressing Goal OT LTG, [place] Level  set up required  -TB     LB Dressing Goal OT LTG, Additional Goal  Pt able  to done L LE brace/shoe and R posthesis with set-up assist  -TB     LB Dressing Goal OT LTG, Outcome goal ongoing  -AR        User Key  (r) = Recorded By, (t) = Taken By, (c) = Cosigned By    Initials Name Provider Type    TB Janeen De Leon, OT Occupational Therapist    AR Rosario Jessica, OT Occupational Therapist          Occupational Therapy Education     Title: PT OT SLP Therapies (Active)     Topic: Occupational Therapy (Done)     Point: ADL training (Done)    Description: Instruct learner(s) on proper safety adaptation and remediation techniques during self care or transfers.   Instruct in proper use of assistive devices.    Learning Progress Summary    Learner Readiness Method Response Comment Documented by Status   Patient Eager E,HARSHIL,D VU,NR reviewed ADL retraining, transfer traaining, bed mobility AR 10/18/17 1048 Done    Acceptance E VU,NR Education initiated for benefits of OOB activity/therapy, role of OT, logroll sequencing for pain mgmt with bed mobility TB 10/17/17 1038 Done               Point: Home exercise program (Done)    Description: Instruct learner(s) on appropriate technique for monitoring, assisting and/or progressing therapeutic exercises/activities.    Learning Progress Summary    Learner Readiness Method Response Comment Documented by Status   Patient Eager E,HARSHIL,SAMIA VU,NR reviewed ADL retraining, transfer traaining, bed mobility AR 10/18/17 1048 Done                      User Key     Initials Effective Dates Name Provider Type Discipline    TB 06/22/15 -  Janeen De Leon, OT Occupational Therapist OT    AR 06/22/15 -  Rosario Jessica, OT Occupational Therapist OT                  OT Recommendation and Plan  Anticipated Equipment Needs At Discharge: bedside commode  Anticipated Discharge Disposition: home with assist  Demonstrates Need for Referral to Another Service: home health care (OT/PT to regain strength and ADL independence)  Therapy Frequency: daily  Plan of Care  Review  Plan Of Care Reviewed With: patient  Progress: progress toward functional goals as expected  Outcome Summary/Follow up Plan: Pt continues require assist with ADLS and mobility, recommend SNF-level rehab in preaparation for safe DC home.         Outcome Measures       10/18/17 0839 10/18/17 0829 10/17/17 0949    How much help from another person do you currently need...    Turning from your back to your side while in flat bed without using bedrails?  3  -AS     Moving from lying on back to sitting on the side of a flat bed without bedrails?  3  -AS     Moving to and from a bed to a chair (including a wheelchair)?  3  -AS     Standing up from a chair using your arms (e.g., wheelchair, bedside chair)?  3  -AS     Climbing 3-5 steps with a railing?  1  -AS     To walk in hospital room?  3  -AS     AM-PAC 6 Clicks Score  16  -AS     How much help from another is currently needed...    Putting on and taking off regular lower body clothing? 2  -AR  2  -TB    Bathing (including washing, rinsing, and drying) 2  -AR  2  -TB    Toileting (which includes using toilet bed pan or urinal) 2  -AR  2  -TB    Putting on and taking off regular upper body clothing 3  -AR  3  -TB    Taking care of personal grooming (such as brushing teeth) 3  -AR  3  -TB    Eating meals 3  -AR  3  -TB    Score 15  -AR  15  -TB    Functional Assessment    Outcome Measure Options AM-PAC 6 Clicks Daily Activity (OT)  -AR AM-PAC 6 Clicks Basic Mobility (PT)  -AS AM-PAC 6 Clicks Daily Activity (OT)  -TB      10/17/17 0948          How much help from another person do you currently need...    Turning from your back to your side while in flat bed without using bedrails? 3  -KM      Moving from lying on back to sitting on the side of a flat bed without bedrails? 3  -KM      Moving to and from a bed to a chair (including a wheelchair)? 2  -KM      Standing up from a chair using your arms (e.g., wheelchair, bedside chair)? 2  -KM      Climbing 3-5 steps  with a railing? 1  -KM      To walk in hospital room? 2  -KM      AM-PAC 6 Clicks Score 13  -KM      Functional Assessment    Outcome Measure Options AM-PAC 6 Clicks Basic Mobility (PT)  -KM        User Key  (r) = Recorded By, (t) = Taken By, (c) = Cosigned By    Initials Name Provider Type    TB Janeen De Leon, OT Occupational Therapist    KM Angela Fitch, PT Physical Therapist    AR Rosario Jessica, OT Occupational Therapist    AS Zoe Yanes, PTA Physical Therapy Assistant           Time Calculation:         Time Calculation- OT       10/18/17 1051          Time Calculation- OT    OT Start Time 0839  -AR      Total Timed Code Minutes- OT 14 minute(s)  -AR      OT Received On 10/18/17  -AR      OT Goal Re-Cert Due Date 10/27/17  -AR        User Key  (r) = Recorded By, (t) = Taken By, (c) = Cosigned By    Initials Name Provider Type    AR Rosario Jessica, OT Occupational Therapist           Therapy Charges for Today     Code Description Service Date Service Provider Modifiers Qty    [id number]  OT THERAPEUTIC ACT EA 15 MIN 10/18/2017 Rosario Jessica OT GO 1               Rosario Jsesica OT  10/18/2017

## 2017-10-18 NOTE — PROGRESS NOTES
[facility] Medicine Services  INPATIENT PROGRESS NOTE    Date of Admission: 10/11/2017  Length of Stay: 7  Primary Care Physician: Wiliam Chu MD    Subjective     Chief Complaint: f/u abd pain    HPI:  Breathing feels some better, has not required O2. Still has cough, hurts all over      Review Of Systems:   Review of Systems    Gen- No fevers, chills  CV- No chest pain, palpitations  Resp- as above  GI- No N/V/D, + abd pain        Objective      Temp:  [98.3 °F (36.8 °C)-100 °F (37.8 °C)] 98.3 °F (36.8 °C)  Heart Rate:  [66-84] 84  Resp:  [16-17] 17  BP: ()/(56-62) 122/62     Physical Exam  Constitutional -no acute distress, non toxic, in bed  HEENT-NCAT, mucous membranes moist  CV-RRR, S1 S2 normal, no m/r/g  Resp-mild RLL rhales, otherwise CTA, loose cough  Abd-soft, non-tender, non-distended, normo active bowel sounds; obese  Ext-No lower extremity, right RKA  Neuro-alert and oriented x 3, speech clear, moves all extremities   Psych-normal affect   Skin- No rash on exposed UE or LE bilaterally;  Abdominal incision noted, covered with coverderm, dressing noted, stained        Results Review:    I have reviewed the labs, radiology results and diagnostic studies.      Results from last 7 days  Lab Units 10/18/17  0508   WBC 10*3/mm3 7.20   HEMOGLOBIN g/dL 8.8*   PLATELETS 10*3/mm3 244       Results from last 7 days  Lab Units 10/18/17  0508   SODIUM mmol/L 133   POTASSIUM mmol/L 3.3*   CO2 mmol/L 28.0   CREATININE mg/dL 1.00   GLUCOSE mg/dL 138*       Culture Data:   Wound Culture   Date Value Ref Range Status   10/11/2017 Light growth (2+) Proteus mirabilis (A)  Preliminary   10/11/2017 Light growth (2+) Enterococcus species (A)  Preliminary     Radiology Data:     RLL infiltrate persists on CXR from this am 10/17/17 (personally reveiwed)    I have reviewed the medications.    [START ON 10/19/2017] Pharmacy Consult  Does not apply Once   acetaminophen 1,000 mg Oral TID    famotidine 20 mg Intravenous Once   ferrous sulfate 325 mg Oral BID With Meals   heparin (porcine) 5,000 Units Subcutaneous Q12H   insulin lispro 0-7 Units Subcutaneous TID With Meals   levothyroxine 75 mcg Oral Q AM   metoprolol tartrate 12.5 mg Oral Q12H   piperacillin-tazobactam 4.5 g Intravenous Q8H   valsartan 160 mg Oral Q24H   vancomycin 1,500 mg Intravenous Q24H   vitamin C 500 mg Oral BID         Assessment/Plan     Assessment/Problem List    Hospital Problem List     * (Principal)S/P right hemicolectomy    Iron deficiency anemia due to chronic blood loss    Colon cancer    Acquired hypothyroidism    Type 2 diabetes mellitus with circulatory disorder (Chronic)    Essential hypertension (Chronic)    MARISOL (obstructive sleep apnea)    Sacral wound    CAD in native artery-noncritical by [facility] 10/12/17    Overview Addendum 10/14/2017 11:37 AM by Lady Lopez MD     [facility] 10/12/17Coronary dominance, right::: LM:   Normal LAD:  Scattered plaque mid vessel up to 30%, LCX:  Eccentric mid vessel plaque of 60%,   RCA:  Scattered plaque of 20%         Acute respiratory failure with hypoxia    Pneumonia        Plan  78 yo presented to outside hospital for BRBPR. He underwent a CT scan and colonoscopy at OSH and was found to have colon cancer. He was transferred to [facility] under Dr. Gooden for cardiac evaluation for abn stress, [facility] showed noncritical CAD. Dr. Gooden asks Hospitalist to take over as attending on 10/13. Patient underwent right hemicolectomy on 10/14 with Dr Worthy for a T2 tumor. On 10/16 was found to have pneumonia and started on Zosyn and doxy. 10/17 blood culture positive for staph neg, started on Vanc, BCID PCR came back not staph sp NOT aureus, likely containment, MBS revealed silent aspiration      Assessment/Plan    Pneumonia, right lower lobe  - continue zosyn and vanc, if still showing signs of improving DC vanc and restart doxy  -consider transition to oral anbx tomorrow  -CXR and labs in  AM    Leukocytosis  - resolved    Dysphagia  -L4 dysphagia, nectar thick diet    Colon Cancer  - s/p hemicolectomy, appears to be recovering well    Loose stool  - secondary to antibiotics vs recent surgery and Entereg use -- if persists consider sending stool for further testing    Sacral wound  - unclear how long this has been preset, patient somewhat vague regarding this but appears to predate hospitalization. Wound care has seen and recommends QOD dressing changes with packing and hydrofera blue    CAD    Generalized weakness  - PT/OT, recommended home with assist and home health    Renal insufficiency  - stable creatinine    Anemia  - Iron counts low, likely due to chronic blood loss from colon CA  - start oral FeSO4 supplement    DC thomas    VTE proph heparin    DC planning: DC home in a few days, concern he may continue to aspirate at home if he is non complaint, hopefully his dysphagia will improve soon          Ana Maria Poe, APRN  10/18/17  2:56 PM

## 2017-10-18 NOTE — PLAN OF CARE
Problem: Patient Care Overview (Adult)  Goal: Plan of Care Review  Outcome: Ongoing (interventions implemented as appropriate)    10/17/17 2004 10/18/17 0454   Coping/Psychosocial Response Interventions   Plan Of Care Reviewed With patient --    Patient Care Overview   Progress --  progress towards functional goals is fair   Outcome Evaluation   Outcome Summary/Follow up Plan --  VSS, new IV started, pt denies pain or discomfort. Has rested well during shift. No further issues or concerns.         Problem: Health Knowledge, Opportunity for Enhanced (Adult,NICU,[place],Obstetrics,Pediatric)  Goal: Knowledgeable about Health Subject/Topic  Outcome: Ongoing (interventions implemented as appropriate)    Problem: Activity Intolerance (Adult)  Goal: Activity Tolerance  Outcome: Ongoing (interventions implemented as appropriate)  Goal: Effective Energy Conservation Techniques  Outcome: Ongoing (interventions implemented as appropriate)    Problem: Skin Integrity Impairment, Risk/Actual (Adult)  Goal: Identify Related Risk Factors and Signs and Symptoms  Outcome: Outcome(s) achieved Date Met:  10/18/17  Goal: Skin Integrity/Wound Healing  Outcome: Ongoing (interventions implemented as appropriate)    Problem: Pressure Ulcer Risk (Micha Scale) (Adult,Obstetrics,Pediatric)  Goal: Identify Related Risk Factors and Signs and Symptoms  Outcome: Ongoing (interventions implemented as appropriate)  Goal: Skin Integrity  Outcome: Ongoing (interventions implemented as appropriate)

## 2017-10-18 NOTE — PLAN OF CARE
Problem: Patient Care Overview (Adult)  Goal: Plan of Care Review  Outcome: Ongoing (interventions implemented as appropriate)    10/18/17 1049   Coping/Psychosocial Response Interventions   Plan Of Care Reviewed With patient   Patient Care Overview   Progress progress toward functional goals as expected   Outcome Evaluation   Outcome Summary/Follow up Plan Pt continues require assist with ADLS and mobility, recommend SNF-level rehab in preaparation for safe DC home.          Problem: Inpatient Occupational Therapy  Goal: Transfer Training Goal 1 LTG- OT  Outcome: Ongoing (interventions implemented as appropriate)    10/17/17 1331 10/18/17 1049   Transfer Training OT LTG   Transfer Training OT LTG, Time to Achieve by discharge --    Transfer Training OT LTG, Activity Type sit to stand/stand to sit;toilet --    Transfer Training OT LTG, [place] Level contact guard assist;verbal cues required --    Transfer Training OT LTG, Assist Device commode, bedside;walker, rolling --    Transfer Training OT LTG, Outcome --  goal ongoing       Goal: Strength Goal LTG- OT  Outcome: Ongoing (interventions implemented as appropriate)    10/17/17 1331 10/18/17 1049   Strength OT LTG   Strength Goal OT LTG, Time to Achieve by discharge --    Strength Goal OT LTG, Functional Goal Pt able to complete B UE HEP x10-15 reps to support ADL preformance --    Strength Goal OT LTG, Outcome --  goal ongoing       Goal: Toileting Goal LTG- OT  Outcome: Ongoing (interventions implemented as appropriate)    10/17/17 1331 10/18/17 1049   Toileting OT LTG   Toileting Goal OT LTG, Time to Achieve by discharge --    Toileting Goal OT LTG, [place] Level set up required --    Toileting Goal OT LTG, Additional Goal from bariatric BSC (recommended for home) --    Toileting Goal OT LTG, Outcome --  goal ongoing       Goal: LB Dressing Goal LTG- OT  Outcome: Ongoing (interventions implemented as appropriate)    10/17/17 1331 10/18/17 1049   LB  Dressing OT LTG   LB Dressing Goal OT LTG, Time to Achieve by discharge --    LB Dressing Goal OT LTG, [place] Level set up required --    LB Dressing Goal OT LTG, Additional Goal Pt able to done L LE brace/shoe and R posthesis with set-up assist --    LB Dressing Goal OT LTG, Outcome --  goal ongoing

## 2017-10-19 ENCOUNTER — APPOINTMENT (OUTPATIENT)
Dept: GENERAL RADIOLOGY | Facility: HOSPITAL | Age: 77
End: 2017-10-19

## 2017-10-19 PROBLEM — R13.10 DYSPHAGIA: Status: ACTIVE | Noted: 2017-10-19

## 2017-10-19 PROBLEM — N28.9 RENAL INSUFFICIENCY: Status: ACTIVE | Noted: 2017-10-19

## 2017-10-19 PROBLEM — I48.91 ATRIAL FIBRILLATION (HCC): Status: ACTIVE | Noted: 2017-10-19

## 2017-10-19 PROBLEM — D64.9 ANEMIA: Status: ACTIVE | Noted: 2017-10-19

## 2017-10-19 LAB
C DIFF TOX GENS STL QL NAA+PROBE: NOT DETECTED
GLUCOSE BLDC GLUCOMTR-MCNC: 152 MG/DL (ref 70–130)
GLUCOSE BLDC GLUCOMTR-MCNC: 173 MG/DL (ref 70–130)
GLUCOSE BLDC GLUCOMTR-MCNC: 186 MG/DL (ref 70–130)
GLUCOSE BLDC GLUCOMTR-MCNC: 187 MG/DL (ref 70–130)
INR PPP: 1.04
POTASSIUM BLD-SCNC: 4.1 MMOL/L (ref 3.5–5.5)
PROTHROMBIN TIME: 11.3 SECONDS (ref 9.6–11.5)

## 2017-10-19 PROCEDURE — 87070 CULTURE OTHR SPECIMN AEROBIC: CPT | Performed by: NURSE PRACTITIONER

## 2017-10-19 PROCEDURE — 25010000002 PIPERACILLIN SOD-TAZOBACTAM PER 1 G: Performed by: NURSE PRACTITIONER

## 2017-10-19 PROCEDURE — 97530 THERAPEUTIC ACTIVITIES: CPT | Performed by: OCCUPATIONAL THERAPIST

## 2017-10-19 PROCEDURE — 99232 SBSQ HOSP IP/OBS MODERATE 35: CPT | Performed by: NURSE PRACTITIONER

## 2017-10-19 PROCEDURE — 92526 ORAL FUNCTION THERAPY: CPT

## 2017-10-19 PROCEDURE — 84132 ASSAY OF SERUM POTASSIUM: CPT | Performed by: INTERNAL MEDICINE

## 2017-10-19 PROCEDURE — 87205 SMEAR GRAM STAIN: CPT | Performed by: NURSE PRACTITIONER

## 2017-10-19 PROCEDURE — 71010 HC CHEST PA OR AP: CPT

## 2017-10-19 PROCEDURE — 25010000002 HEPARIN (PORCINE) PER 1000 UNITS: Performed by: NURSE PRACTITIONER

## 2017-10-19 PROCEDURE — 97116 GAIT TRAINING THERAPY: CPT

## 2017-10-19 PROCEDURE — 82962 GLUCOSE BLOOD TEST: CPT

## 2017-10-19 PROCEDURE — 85610 PROTHROMBIN TIME: CPT | Performed by: NURSE PRACTITIONER

## 2017-10-19 RX ORDER — LOPERAMIDE HYDROCHLORIDE 2 MG/1
2 CAPSULE ORAL 3 TIMES DAILY PRN
Status: DISCONTINUED | OUTPATIENT
Start: 2017-10-19 | End: 2017-10-20 | Stop reason: HOSPADM

## 2017-10-19 RX ORDER — L.ACID,PARA/B.BIFIDUM/S.THERM 8B CELL
1 CAPSULE ORAL DAILY
Status: DISCONTINUED | OUTPATIENT
Start: 2017-10-19 | End: 2017-10-20 | Stop reason: HOSPADM

## 2017-10-19 RX ORDER — AMOXICILLIN AND CLAVULANATE POTASSIUM 875; 125 MG/1; MG/1
1 TABLET, FILM COATED ORAL EVERY 12 HOURS SCHEDULED
Status: DISCONTINUED | OUTPATIENT
Start: 2017-10-19 | End: 2017-10-20 | Stop reason: HOSPADM

## 2017-10-19 RX ORDER — WARFARIN SODIUM 5 MG/1
10 TABLET ORAL
Status: DISCONTINUED | OUTPATIENT
Start: 2017-10-20 | End: 2017-10-20 | Stop reason: HOSPADM

## 2017-10-19 RX ORDER — WARFARIN SODIUM 7.5 MG/1
7.5 TABLET ORAL
Status: DISCONTINUED | OUTPATIENT
Start: 2017-10-19 | End: 2017-10-20 | Stop reason: HOSPADM

## 2017-10-19 RX ADMIN — ACETAMINOPHEN 1000 MG: 500 TABLET ORAL at 20:50

## 2017-10-19 RX ADMIN — INSULIN LISPRO 2 UNITS: 100 INJECTION, SOLUTION INTRAVENOUS; SUBCUTANEOUS at 11:58

## 2017-10-19 RX ADMIN — METOPROLOL TARTRATE 12.5 MG: 25 TABLET, FILM COATED ORAL at 09:05

## 2017-10-19 RX ADMIN — ACETAMINOPHEN 1000 MG: 500 TABLET ORAL at 09:04

## 2017-10-19 RX ADMIN — Medication 325 MG: at 17:52

## 2017-10-19 RX ADMIN — OXYCODONE HYDROCHLORIDE AND ACETAMINOPHEN 500 MG: 500 TABLET ORAL at 09:04

## 2017-10-19 RX ADMIN — WARFARIN SODIUM 7.5 MG: 7.5 TABLET ORAL at 17:52

## 2017-10-19 RX ADMIN — Medication 1 CAPSULE: at 13:11

## 2017-10-19 RX ADMIN — TAZOBACTAM SODIUM AND PIPERACILLIN SODIUM 4.5 G: 500; 4 INJECTION, SOLUTION INTRAVENOUS at 05:38

## 2017-10-19 RX ADMIN — OXYCODONE HYDROCHLORIDE AND ACETAMINOPHEN 500 MG: 500 TABLET ORAL at 17:53

## 2017-10-19 RX ADMIN — INSULIN LISPRO 2 UNITS: 100 INJECTION, SOLUTION INTRAVENOUS; SUBCUTANEOUS at 17:53

## 2017-10-19 RX ADMIN — METOPROLOL TARTRATE 12.5 MG: 25 TABLET, FILM COATED ORAL at 20:50

## 2017-10-19 RX ADMIN — INSULIN LISPRO 2 UNITS: 100 INJECTION, SOLUTION INTRAVENOUS; SUBCUTANEOUS at 09:04

## 2017-10-19 RX ADMIN — LEVOTHYROXINE SODIUM 75 MCG: 75 TABLET ORAL at 05:12

## 2017-10-19 RX ADMIN — Medication 325 MG: at 09:04

## 2017-10-19 RX ADMIN — HEPARIN SODIUM 5000 UNITS: 5000 INJECTION, SOLUTION INTRAVENOUS; SUBCUTANEOUS at 09:04

## 2017-10-19 RX ADMIN — VALSARTAN 160 MG: 160 TABLET, FILM COATED ORAL at 09:04

## 2017-10-19 RX ADMIN — AMOXICILLIN AND CLAVULANATE POTASSIUM 1 TABLET: 875; 125 TABLET, FILM COATED ORAL at 20:50

## 2017-10-19 RX ADMIN — AMOXICILLIN AND CLAVULANATE POTASSIUM 1 TABLET: 875; 125 TABLET, FILM COATED ORAL at 11:33

## 2017-10-19 RX ADMIN — ACETAMINOPHEN 1000 MG: 500 TABLET ORAL at 17:53

## 2017-10-19 NOTE — PLAN OF CARE
Problem: Patient Care Overview (Adult)  Goal: Plan of Care Review  Outcome: Ongoing (interventions implemented as appropriate)    10/19/17 1146   Coping/Psychosocial Response Interventions   Plan Of Care Reviewed With patient   Patient Care Overview   Progress progress toward functional goals as expected   Outcome Evaluation   Outcome Summary/Follow up Plan Dysphagia tx complete. Pt alert and cooperative, participated well in tx. Exercises were completed w/consistent cues at 60% accuracy, pt is motivated to return to regular and thins. Education provided on small bites and sips, reasoning for no straws, and importance of current Level 4 diet and nectar thick liquids. Pt verbalized understanding and agreed w/recs. REC cont dys tx.         Problem: Inpatient SLP  Goal: Dysphagia- Patient will improve swallowing skills to the level that a repeat evaluation is indicated  Outcome: Ongoing (interventions implemented as appropriate)    10/19/17 1146   Repeat Evaluation Needed   Swallow Skills to Level that Repeat Evaluation Indicated- SLP, Date Established 10/19/17   Swallow Skills to Level that Repeat Evaluation Indicated- SLP, Time to Achieve by discharge   Swallow Skills to Level that Repeat Evaluation Indicated- SLP, Date Goal Reviewed 10/19/17   Swallow Skills to Level that Repeat Evaluation Indicated- SLP, Outcome goal ongoing

## 2017-10-19 NOTE — PROGRESS NOTES
Continued Stay Note  [facility]     Patient Name: Jonah Hannon  MRN: [id number]  Today's Date: 10/19/2017    Admit Date: 10/11/2017          Discharge Plan       10/19/17 1537    Case Management/Social Work Plan    Plan discharge planning    Patient/Family In Agreement With Plan yes    Additional Comments CM received consult/order for home health services to include skilled nursing, PT, OT, and speech therapy services.  CM has contacted [facility] services and provided update of needs and potential discharge for Friday or Saturday.  An order is still needed for the requested Bariatric Bedside Commode.  CM will arrange as soon as this order is written.  CM continues to follow for discharge planning needs.              Discharge Codes     None        Expected Discharge Date and Time     Expected Discharge Date Expected Discharge Time    Oct 18, 2017             DAVE Gutierrez

## 2017-10-19 NOTE — THERAPY TREATMENT NOTE
Acute Care - Speech Language Pathology   Swallow Progress Note [facility]      Patient Name: Jonah Hannon  : 1940  MRN: [id number]  Today's Date: 10/19/2017  Onset of Illness/Injury or Date of Surgery Date: 10/11/17            Admit Date: 10/11/2017    Visit Dx:      ICD-10-CM ICD-9-CM   1. Pharyngeal dysphagia R13.13 787.23   2. Colon cancer C18.9 153.9   3. Impaired mobility and ADLs Z74.09 799.89   4. Impaired functional mobility, balance, gait, and endurance Z74.09 V49.89     Patient Active Problem List   Diagnosis   • Iron deficiency anemia due to chronic blood loss   • Colon cancer   • Acquired hypothyroidism   • Type 2 diabetes mellitus with circulatory disorder   • Essential hypertension   • MARISOL (obstructive sleep apnea)   • Sacral wound   • S/P right hemicolectomy   • CAD in native artery-noncritical by [facility] 10/12/17   • Acute respiratory failure with hypoxia   • Pneumonia             Adult Rehabilitation Note       10/19/17 1100 10/18/17 0839 10/18/17 0829    Rehab Assessment/Intervention    Discipline (P)  speech language pathologist  -RK occupational therapist  -AR physical therapy assistant  -AS    Document Type (P)  therapy note (daily note)  -RK therapy note (daily note)  -AR therapy note (daily note)  -AS    Subjective Information (P)  agree to therapy;no complaints  -RK agree to therapy;complains of;weakness;pain  -AR agree to therapy;complains of;weakness  -AS    Patient Effort, Rehab Treatment (P)  good  -RK good  -AR good  -AS    Symptoms Noted During/After Treatment  fatigue  -AR fatigue  -AS    Precautions/Limitations  fall precautions;other (see comments)   R BKA with prosthesis, LLE hinged brace w/shoe  -AR     Equipment Issued to Patient  --   pt has reacher, declined other AE items  -AR     Recorded by [RK] Olivia Mon, Speech Therapy Student [AR] Rosario Jessica OT [AS] Zoe Yanes PTA    Pain Assessment    Pain Assessment (P)  No/denies pain  -RK 0-10  -AR  No/denies pain  -AS    Pain Score  7  -AR 0  -AS    Post Pain Score  7  -AR 0  -AS    Pain Type  Chronic pain  -AR     Pain Location  Generalized  -AR     Pain Intervention(s)  Repositioned;Ambulation/increased activity  -AR     Response to Interventions  tolerated  -AR     Recorded by [RK] Olivia Mon, Speech Therapy Student [AR] Rosario Jessica, OT [AS] Zoe Yanes PTA    Cognitive Assessment/Intervention    Current Cognitive/Communication Assessment  functional  -AR functional  -AS    Orientation Status  oriented x 4  -AR oriented x 4  -AS    Follows Commands/Answers Questions  100% of the time;able to follow multi-step instructions  -% of the time;able to follow single-step instructions  -AS    Personal Safety  WNL/WFL  -AR WNL/WFL  -AS    Personal Safety Interventions  fall prevention program maintained  -AR fall prevention program maintained;gait belt;nonskid shoes/slippers when out of bed;other (see comments)   R prosthesis, L LE brace/shoe  -AS    Recorded by  [AR] Rosario Jessica, NANDO [AS] Zoe Yanes, PTA    Improve timing of pharyngeal response    To improve timing of pharyngeal response, patient will: (P)  Swallow in timely way using three second prep;Swallow in timely way using super-supraglottic swallow;Swallow in timely way using Mendelsohn maneuver;80%;with consistent cues  -RK      Status: Improve timing of pharyngeal response (P)  Progressing as expected  -RK      Timing of Pharyngeal Response Progress (P)  60%;with consistent cues;continue to adress  -RK      Recorded by [RK] Olivia Mon, Speech Therapy Student      Improve closure at entrance to airway    To improve closure at entrance to airway, patient will: (P)  Complete super-supraglottic swallow;80%;with consistent cues  -RK      Status: Improve closure at entrance to airway (P)  Progressing as expected  -RK      Closure at Entrance to Airway Progress (P)  60%;with consistent cues;continue to adress  -RK       Recorded by [RK] Olivia Mon, Speech Therapy Student      Improve tongue base & pharyngeal wall squeeze    To improve tongue base & pharyngeal wall squeeze, patient will: (P)  Complete effortful swallow;Complete tongue hold swallow;80%;with consistent cues  -RK      Status: Improve tongue base & pharyngeal wall squeeze (P)  Progressing as expected  -RK      Tongue Base/Pharyngeal Wall Squeeze Progress (P)  60%;with consistent cues;continue to adress  -RK      Recorded by [RK] Olivia Mon, Speech Therapy Student      Bed Mobility, Assessment/Treatment    Bed Mobility, Assistive Device  bed rails;head of bed elevated  -AR     Bed Mobility, Scoot/Bridge, [place]  supervision required  -AR     Bed Mob, Supine to Sit, [place]  supervision required  -AR supervision required  -AS    Bed Mob, Sit to Supine, [place]  supervision required  -AR supervision required  -AS    Bed Mobility, Impairments  strength decreased  -AR strength decreased  -AS    Bed Mobility, Comment   good technique  -AS    Recorded by  [AR] Rosario Jessica, OT [AS] Zoe Yanes, PTA    Transfer Assessment/Treatment    Transfers, Sit-Stand [place]  verbal cues required;minimum assist (75% patient effort);2 person assist required  -AR verbal cues required;minimum assist (75% patient effort);2 person assist required  -AS    Transfers, Stand-Sit [place]  verbal cues required;minimum assist (75% patient effort);2 person assist required  -AR verbal cues required;minimum assist (75% patient effort);2 person assist required  -AS    Transfers, Sit-Stand-Sit, Assist Device  rolling walker  -AR rolling walker  -AS    Transfer, Safety Issues   weight-shifting ability decreased  -AS    Transfer, Impairments  strength decreased;impaired balance  -AR strength decreased;impaired balance  -AS    Transfer, Comment  cues for hand palcement and bed approach  -AR verbal cues to keep walker close and for posture. Limited by  weakness.  -AS    Recorded by  [AR] Rosario Jessica OT [AS] Zoe Yanes PTA    Gait Assessment/Treatment    Gait, [place] Level   verbal cues required;minimum assist (75% patient effort);2 person assist required  -AS    Gait, Assistive Device   rolling walker  -AS    Gait, Distance (Feet)   32  -AS    Gait, Gait Deviations   huang decreased;forward flexed posture;step length decreased  -AS    Gait, Safety Issues   step length decreased;weight-shifting ability decreased  -AS    Gait, Impairments   strength decreased;impaired balance  -AS    Gait, Comment   verbal cues to stay inside walker and for posture.  -AS    Recorded by   [AS] Zoe Yanes PTA    Upper Body Dressing Assessment/Training    UB Dressing Assess/Train, Clothing Type  doffing:;donning:;hospital gown  -AR     UB Dressing Assess/Train, Position  sitting  -AR     UB Dressing Assess/Train, [place]  minimum assist (75% patient effort);verbal cues required  -AR     Recorded by  [AR] Rosario Jessica OT     Lower Body Dressing Assessment/Training    LB Dressing Assess/Train, Clothing Type  donning:;doffing:   RLE prosthesis, LLE hinged brace  -AR     LB Dressing Assess/Train, Assist Device  --   pt declined LH shoe horn   -AR     LB Dressing Assess/Train, Position  edge of bed  -AR     LB Dressing Assess/Train, [place]  maximum assist (25% patient effort);minimum assist (75% patient effort)   min assist RLE prosthesis, LLE hinged brace  -AR     LB Dressing Assess/Train, Impairments  impaired balance;strength decreased;motor control impaired  -AR     LB Dressing Assess/Train, Comment  Pt declined AE, he required min assist for prosthesis, extensive assist to don LLE hinged brace and able to tell OT how to don/doff  -AR     Recorded by  [AR] Rosario Jessica OT     Positioning and Restraints    Pre-Treatment Position  in bed  -AR in bed  -AS    Post Treatment Position  bed  -AR bed  -AS    In Bed  supine;call light  within reach;encouraged to call for assist;exit alarm on  -AR supine;call light within reach;encouraged to call for assist;exit alarm on  -AS    Recorded by  [AR] Rosario Jessica, OT [AS] Zoe Yanes PTA      User Key  (r) = Recorded By, (t) = Taken By, (c) = Cosigned By    Initials Name Effective Dates    AR Rosario Jessica OT 06/22/15 -     AS Zoe Yanes PTA 06/22/15 -     KELLY Mon Speech Therapy Student 08/24/17 -                   IP SLP Goals       10/19/17 1146          Repeat Evaluation Needed    Swallow Skills to Level that Repeat Evaluation Indicated- SLP, Date Established (P)  10/19/17  -RK      Swallow Skills to Level that Repeat Evaluation Indicated- SLP, Time to Achieve (P)  by discharge  -RK      Swallow Skills to Level that Repeat Evaluation Indicated- SLP, Date Goal Reviewed (P)  10/19/17  -RK      Swallow Skills to Level that Repeat Evaluation Indicated- SLP, Outcome (P)  goal ongoing  -RK        User Key  (r) = Recorded By, (t) = Taken By, (c) = Cosigned By    Initials Name Provider Type    KELLY Mon, Speech Therapy Student Speech Therapy Student          EDUCATION  The patient has been educated in the following areas:   Dysphagia (Swallowing Impairment) Modified Diet Instruction.    SLP Recommendation and Plan                                           Plan of Care Review  Plan Of Care Reviewed With: (P) patient  Progress: (P) progress toward functional goals as expected  Outcome Summary/Follow up Plan: (P) Dysphagia tx complete. Pt alert and cooperative, participated well in tx. Exercises were completed w/constent cues at 60% accuracy, pt is motivated to return to regular and thins. Education provided on small bites and sips, reasoning for no straws, and importance of current Level 4 diet and nectar thick liquids.  REC cont dys tx.           Time Calculation:         Time Calculation- SLP       10/19/17 1155          Time Calculation- SLP    SLP Start  Time (P)  1100  -RK      SLP Received On (P)  10/19/17  -KELLY        User Key  (r) = Recorded By, (t) = Taken By, (c) = Cosigned By    Initials Name Provider Type    KELLY Mon Speech Therapy Student Speech Therapy Student          Therapy Charges for Today     Code Description Service Date Service Provider Modifiers Qty    [id number] HC ST TREATMENT SWALLOW 4 10/19/2017 Olivia Mon Speech Therapy Student GN 1                 Olivia Mon Speech Therapy Student  10/19/2017

## 2017-10-19 NOTE — CONSULTS
Jonah Hannon  1940  [id number]    Date of Consult: 10/19/2017    Date of Admission: 10/11/2017      Requesting Provider: Wiliam Chu MD  Evaluating Physician: Butch Ward MD    CC: Cough and SOA      Reason for Consultation: RLL pneumonia with dysphagia  History of present illness:    Patient is a 77 y.o.  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  Patient presented to [facility] secondary to bright red blood per rectum.     CT scan was performed showing a mass and colonoscopy confirmed colon cancer.  He was transferred to [facility] on 10/11/2017 or colorectal surgery evaluation and Dr. Worthy performed a hemicolectomy on 10/13/2017. He has postoperative RLL infiltrate on CT scan with notable Stage IV dysphagia (Speech therapy to evaluate today) and treated with vancomycin and Zosyn with normalization of his white blood cell count and normal renal function.  He has normal stool output that are loose and numbering between 3 and 5 and a 24-hour shift.  Patient has a history of pilonidal cyst approximately 20 years ago requiring surgical intervention.  Since that time, he has a fluctuating coccygeal ulceration which is currently open and incised.  Cultures of this wound were performed showing Proteus mirabilis, strep species, enterococcus.  Patient is scheduled for potential discharge home today and there is concern for his underlying right lower lobe pneumonia in the setting of dysphagia in addition to his chronic open wound with polymicrobial infection and infectious disease was asked to see the patient for antibiotic recommendations and follow-up evaluation.    Past Medical History:   Diagnosis Date   • CHF (congestive heart failure)    • CMT  (Charcot-Collette-Tooth disease)    • Diabetes    • Hypertension        Past Surgical History:   Procedure Laterality Date   • BELOW KNEE LEG AMPUTATION     • CARDIAC CATHETERIZATION     • CARDIAC CATHETERIZATION N/A 10/12/2017    Procedure: Left Heart Cath;  Surgeon: Lloyd Gooden MD;  Location:  [facility];  Service:    • COLON RESECTION N/A 10/14/2017    Procedure: GAUDENCIO COLECTOMY AND UMBILICAL HERNIA REPAIR;  Surgeon: Keith Worthy MD;  Location:  [facility];  Service:    • CYST REMOVAL      pilonodal cyst on back removed.        Social History Narrative   No current tobacco alcohol or illicit drug use    Family history:  Parents with hypertension and coronary artery disease    No Known Allergies    Medication:  Current Facility-Administered Medications   Medication Dose Route Frequency Provider Last Rate Last Dose   • ! vancomycin trough 10/19 @ 1130 - hold 1200 dose for level > 20 mcg/ml   Does not apply Once Raegan [facility]       • acetaminophen (TYLENOL) tablet 1,000 mg  1,000 mg Oral TID Keith Worthy MD   1,000 mg at 10/18/17 2132   • acetaminophen (TYLENOL) tablet 650 mg  650 mg Oral Q4H PRN Lloyd Gooden MD       • ALPRAZolam (XANAX) tablet 0.25 mg  0.25 mg Oral TID PRN Lloyd Gooden MD       • aluminum-magnesium hydroxide-simethicone (MAALOX MAX) 400-400-40 MG/5ML suspension 30 mL  30 mL Oral Q6H PRN Keith Worthy MD   30 mL at 10/16/17 1354   • calcium carbonate (TUMS) chewable tablet 500 mg (200 mg elemental)  1 tablet Oral Daily PRN AUTSIN Cobb       • dextrose (D50W) solution 25 g  25 g Intravenous Q15 Min PRN Lady Lopez MD       • dextrose (GLUTOSE) oral gel 15 g  15 g Oral Q15 Min PRN Lady Lopez MD       • diazePAM (VALIUM) tablet 5 mg  5 mg Oral Q6H PRN Keith Worthy MD       • diphenhydrAMINE (BENADRYL) capsule 50 mg  50 mg Oral Nightly PRN Lloyd Gooden MD       • famotidine (PEPCID) injection 20 mg  20 mg  Intravenous Once Santiago Lugo MD       • ferrous sulfate tablet 325 mg  325 mg Oral BID With Meals Ana Maria Poe APRN   325 mg at 10/18/17 1723   • glucagon (GLUCAGEN) injection 1 mg  1 mg Subcutaneous Q15 Min PRN Lady Lopez MD       • heparin (porcine) 5000 UNIT/ML injection 5,000 Units  5,000 Units Subcutaneous Q12H Ana Maria Poe APRN   5,000 Units at 10/18/17 2132   • HYDROcodone-acetaminophen (NORCO) 7.5-325 MG per tablet 1 tablet  1 tablet Oral Q4H PRN Keith Worthy MD   1 tablet at 10/14/17 1713   • influenza vac split quad (FLUZONE,FLUARIX,AFLURIA) injection 0.5 mL  0.5 mL Intramuscular During Hospitalization Lloyd Gooden MD       • insulin lispro (humaLOG) injection 0-7 Units  0-7 Units Subcutaneous TID With Meals Lady Lopez MD   2 Units at 10/18/17 1726   • levothyroxine (SYNTHROID, LEVOTHROID) tablet 75 mcg  75 mcg Oral Q AM Lloyd Gooden MD   75 mcg at 10/19/17 0512   • lidocaine PF 1% (XYLOCAINE) injection 0.5 mL  0.5 mL Injection Once PRN Santiago Lugo MD       • metoprolol tartrate (LOPRESSOR) half tablet 12.5 mg  12.5 mg Oral Q12H Lady Lopez MD   12.5 mg at 10/18/17 2132   • naloxone (NARCAN) injection 0.4 mg  0.4 mg Intravenous Q5 Min PRN Keith Worthy MD       • ondansetron (ZOFRAN) injection 4 mg  4 mg Intravenous Q6H PRN Keith Worthy MD       • Pharmacy to dose vancomycin   Does not apply Continuous PRN Drake Varma MD       • piperacillin-tazobactam (ZOSYN) 4.5 g in iso-osmotic dextrose 100 mL IVPB (premix)  4.5 g Intravenous Q8H Ana Maria Poe APRN   4.5 g at 10/19/17 0538   • potassium chloride (KLOR-CON) packet 40 mEq  40 mEq Oral PRN Drake Varma MD       • potassium chloride (MICRO-K) CR capsule 40 mEq  40 mEq Oral PRN Drake Varma MD   40 mEq at 10/18/17 1625   • sodium chloride 0.9 % flush 1-10 mL  1-10 mL Intravenous PRN Santiago Lugo MD       • temazepam (RESTORIL) capsule 7.5 mg  7.5 mg Oral Nightly PRN Lloyd Ayoub  "MD Giacomo       • valsartan (DIOVAN) tablet 160 mg  160 mg Oral Q24H Lloyd Gooden MD   160 mg at 10/18/17 0818   • vancomycin IVPB 1500 mg in 0.9% NaCl (Premix) 500 mL  1,500 mg Intravenous Q24H aRegan [place] RP   1,500 mg at 10/18/17 1107   • vitamin C (ASCORBIC ACID) tablet 500 mg  500 mg Oral BID AUSTIN Camargo   500 mg at 10/18/17 1723       Antibiotics:  Vanc and Zosyn       Review of Systems  Full 12 point review of systems reviewed and negative except for    Physical Exam:   Vital Signs   /85 (BP Location: Left arm, Patient Position: Lying)  Pulse 82  Temp 97.2 °F (36.2 °C) (Oral)   Resp 20  Ht 74\" (188 cm)  Wt (!) 300 lb 14.4 oz (136 kg)  SpO2 94%  BMI 38.63 kg/m2  Temp (24hrs), Av.9 °F (36.6 °C), Min:97.2 °F (36.2 °C), Max:98.5 °F (36.9 °C)      GENERAL: Awake and alert, in no acute distress. Good historian.  Morbidly obese sitting in the bed.  No apparent distress.  HEENT: Normocephalic, atraumatic.  PERRL. EOMI. No conjunctival injection. No icterus. Oropharynx clear without evidence of thrush or exudate. No evidence of peridontal disease.   Dry mucous membranes.  NECK: Supple without nuchal rigidity  LYMPH: No cervical, axillary or inguinal lymphadenopathy. No neck masses  HEART: RRR;  systolic ejection murmur grade 2/6 present.  No rubs or gallops.  LUNGS:  Diminished breath sounds throughout secondary to body habitus but otherwiseClear to auscultation bilaterally without wheezing, rales, rhonchi. Normal respiratory effort. no use of supplemental oxygen  ABDOMEN:  Morbidly obese with surgical abdominal incision with staples intact and no evidence of dehiscence.  Normal bowel sounds.  No rebound or guarding.   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  Has prosthesis for this to assist with mobility.  Patient has LLE Charcot " arthropathy with severely deformed left foot   SKIN:   Sacral region with a nickel-sized open wound healthy granulation tissue present without wound drainage.  Wet-to-dry packing in place.  NEURO: Oriented to PPT. No focal deficits.   PSYCHIATRIC: Normal insight and judgement. Cooperative with PE    Laboratory Data      Results from last 7 days  Lab Units 10/18/17  0508 10/17/17  0554 10/16/17  0506   WBC 10*3/mm3 7.20 8.88 13.11*   HEMOGLOBIN g/dL 8.8* 8.7* 9.8*   HEMATOCRIT % 27.7* 28.4* 31.0*   PLATELETS 10*3/mm3 244 234 253       Results from last 7 days  Lab Units 10/19/17  0442 10/18/17  0508   SODIUM mmol/L  --  133   POTASSIUM mmol/L 4.1 3.3*   CHLORIDE mmol/L  --  99   CO2 mmol/L  --  28.0   BUN mg/dL  --  14   CREATININE mg/dL  --  1.00   GLUCOSE mg/dL  --  138*   CALCIUM mg/dL  --  8.6*                   Estimated Creatinine Clearance: 89.6 mL/min (by C-G formula based on Cr of 1).      Microbiology:  CDT PCR: negative  Blood culture CNS 1/2 sets with repeat culture negative  Wound culture 10/11: Proteus, Enterococcus, Strep sp    Radiology:  Imaging Results (last 24 hours)     Procedure Component Value Units Date/Time    XR Chest 1 View [[id number]] Collected:  10/19/17 0845     Updated:  10/19/17 0845    Narrative:       EXAMINATION: XR CHEST 1 VW-10/19/2017:      INDICATION: Pneumonia; R13.13-Dysphagia, pharyngeal phase;  C18.9-Malignant neoplasm of colon, unspecified; Z74.09-Other reduced  mobility; Z74.09-Other reduced mobility.      COMPARISON: 10/17/2017.     FINDINGS: The heart is within the limits of normal. There is airspace  disease in the right lower lobe with minimal disease of the left lung  base laterally. There is no pneumothorax and there has been no  significant change.           Impression:       There has been no change since the previous examination of  10/17/2017.     D:  10/19/2017  E:  10/19/2017                     PROBLEM LIST:   Right lower lobe pneumonia c/w aspiration  pneumonia  Sacral wound With polymicrobial infection including strep species, enterococcus, Proteus mirabilis  Colon cancer and s/p hemicolectomyWith anastomosis procedure performed   Reportedly Level IV Dysphagia- scheduled for repeat swallow evaluation today  Hypokalemia  Anemia  Type 2 diabetes mellitus, hypertension, Charcot arthropathy    ASSESSMENT:  Patient is a 77-year-old male transferred to [facility] secondary to new diagnosis of colon cancer with colorectal surgical consultation and required hemicolectomy.  Patient had postoperative right lower lobe pneumonia formation was initiated on antibiotics with vancomycin and Zosyn with improvement in his white blood cell count respiratory effort.  He has stage IV dysphasia and is scheduled for repeat swallow evaluation today.  He is currently  Wishing for no feeding interventions but wants to see how his swallow study goes today to assist with decision making.  Patient also has a chronic sacral decubitus ulcer with polymicrobial infection on culture this admission.    According to chart review, patient is wanting to return home with family friend assistance and is refusing rehabilitation placement at this time.  Recommend de-escalation of his antibiotics from vancomycin and Zosyn to oral antibiotic therapy using Augmentin 875/125 mg by mouth twice a day ×10 days.  Augmentin will cover intra-abdominal source of infection/aspiration pneumonia/polymicrobial sacral wound infection.  He also has a risk of development of C. difficile colitis and will leave the prescription for daily probiotic for 30 days.  Patient has follow-up appointment with us on Monday, October 30 for wound review and repeat chest x-ray.      PLAN:  Recommend  discontinuation of vancomycin and Zosyn    start Augmentin 875/125 by mouth twice a day ×10 days cover wound and possible aspiration  Daily probiotic ×30 days  Wound care per home health at the time of discharge  Follow-up  appointment with Dr. Ward on 10/30/2017 for repeat wound evaluation and chest x-ray    Um:  Discharge planning on oral antibiotics      Dr. Butch Ward saw the patient, performed the physical exam, reviewed the laboratory data and guided with the formulation of the above problem list, assessment and treatment plan.     Butch Ward MD  10/19/2017

## 2017-10-19 NOTE — PROGRESS NOTES
[facility] Medicine Services  INPATIENT PROGRESS NOTE    Date of Admission: 10/11/2017  Length of Stay: 8  Primary Care Physician: Wiliam Chu MD    Subjective     Chief Complaint: f/u abd pain    HPI:  SOA is better, still coughing but not as much, hasn't walked yet today, yesterday walked to the door, does not want rehab at [facility], abdominal pain well controlled      Review Of Systems:   Review of Systems    Gen- No fevers, chills  CV- No chest pain, palpitations  Resp- as above  GI- No N/V/D,         Objective      Temp:  [97.2 °F (36.2 °C)-98.5 °F (36.9 °C)] 97.2 °F (36.2 °C)  Heart Rate:  [78-91] 88  Resp:  [16-20] 20  BP: (118-140)/(60-85) 139/85     Physical Exam  Constitutional -no acute distress, non toxic, in bed  HEENT-NCAT, mucous membranes moist  CV-RRR, S1 S2 normal, no m/r/g  Resp-faint RLL rhales, otherwise CTA, loose cough, tan sputum  Abd-soft, non-tender, non-distended, normo active bowel sounds; obese  Ext-No lower extremity, right RKA  Neuro-alert and oriented x 3, speech clear, moves all extremities   Psych-normal affect   Skin-  Abdominal incision noted, covered with coverderm, dressing noted, stained        Results Review:    I have reviewed the labs, radiology results and diagnostic studies.      Results from last 7 days  Lab Units 10/18/17  0508   WBC 10*3/mm3 7.20   HEMOGLOBIN g/dL 8.8*   PLATELETS 10*3/mm3 244       Results from last 7 days  Lab Units 10/19/17  0442 10/18/17  0508   SODIUM mmol/L  --  133   POTASSIUM mmol/L 4.1 3.3*   CO2 mmol/L  --  28.0   CREATININE mg/dL  --  1.00   GLUCOSE mg/dL  --  138*       Culture Data:   Wound Culture   Date Value Ref Range Status   10/11/2017 Light growth (2+) Proteus mirabilis (A)  Preliminary   10/11/2017 Light growth (2+) Enterococcus species (A)  Preliminary     Radiology Data:     RLL infiltrate persists on CXR from this am 10/17/17 (personally reveiwed)    I have reviewed the medications.    acetaminophen 1,000  mg Oral TID   amoxicillin-clavulanate 1 tablet Oral Q12H   ferrous sulfate 325 mg Oral BID With Meals   heparin (porcine) 5,000 Units Subcutaneous Q12H   insulin lispro 0-7 Units Subcutaneous TID With Meals   lactobacillus acidophilus 1 capsule Oral Daily   levothyroxine 75 mcg Oral Q AM   metoprolol tartrate 12.5 mg Oral Q12H   valsartan 160 mg Oral Q24H   vitamin C 500 mg Oral BID         Assessment/Plan     Assessment/Problem List    Hospital Problem List     * (Principal)S/P right hemicolectomy    Iron deficiency anemia due to chronic blood loss    Colon cancer    Acquired hypothyroidism    Type 2 diabetes mellitus with circulatory disorder (Chronic)    Essential hypertension (Chronic)    MARISOL (obstructive sleep apnea)    Sacral wound    CAD in native artery-noncritical by [facility] 10/12/17    Overview Addendum 10/14/2017 11:37 AM by Lady Lopez MD     [facility] 10/12/17Coronary dominance, right::: LM:   Normal LAD:  Scattered plaque mid vessel up to 30%, LCX:  Eccentric mid vessel plaque of 60%,   RCA:  Scattered plaque of 20%         Acute respiratory failure with hypoxia    Pneumonia    Atrial fibrillation    Dysphagia        Plan  78 yo presented to outside hospital for BRBPR. He underwent a CT scan and colonoscopy at OSH and was found to have colon cancer. He was transferred to [facility] under Dr. Gooden for cardiac evaluation for abn stress, [facility] showed noncritical CAD. Dr. Gooden asks Hospitalist to take over as attending on 10/13. Patient underwent right hemicolectomy on 10/14 with Dr Worthy for a T2 tumor. On 10/16 was found to have pneumonia and started on Zosyn and doxy. 10/17 blood culture positive for staph neg, started on Vanc, BCID PCR came back not staph sp NOT aureus, likely containment, MBS revealed silent aspiration. 10/19 ID consulted for anbx recommendations and to assist with follow up.     Assessment/Plan    Pneumonia, right lower lobe  -stop vanc and zosyn, start Augmentin, appreciate ID  assitance  -f/u with Dr. Ward on 10/30, CXR at that time    Dysphagia  -L4 dysphagia, nectar thick diet  -will need speech therapy at discharge    Colon Cancer  - s/p hemicolectomy, appears to be recovering well, stage 1 cancer no chemo needed per Dr. Worthy    Loose stool  -c diff negative  -probiotic    Sacral wound  - unclear how long this has been preset, patient somewhat vague regarding this but appears to predate hospitalization. Wound care has seen and recommends QOD dressing changes with packing and hydrofera blue  -augmentin to cover wound infection, f/u with ID on 10/30  -home health skilled nursing for dressing changes    Generalized weakness  - PT/OT, recommended home with assist and home health  - will need home PT/OT    Anemia  - Iron counts low, likely due to chronic blood loss from colon CA  - start oral FeSO4 supplement    A fib  -spoke with Dr. Worthy will resume Coumadin    DVT: Coumadin  Dispo: Home in 1-2 days, will need home health skilled nursing, PT/OT, speech      AUSTIN Camargo  10/19/17  2:33 PM

## 2017-10-19 NOTE — PLAN OF CARE
Problem: Patient Care Overview (Adult)  Goal: Plan of Care Review  Outcome: Ongoing (interventions implemented as appropriate)    10/19/17 1639   Coping/Psychosocial Response Interventions   Plan Of Care Reviewed With patient   Patient Care Overview   Progress improving   Outcome Evaluation   Outcome Summary/Follow up Plan pt independent in supine to sit and nearly ind sit to stand, Doing well, Ambulation limited by chronic pain and weak LE's. Recommend rehab at disch         Problem: Inpatient Physical Therapy  Goal: Bed Mobility Goal LTG- PT  Outcome: Ongoing (interventions implemented as appropriate)    10/17/17 1108 10/18/17 0946 10/19/17 1639   Bed Mobility PT LTG   Bed Mobility PT LTG, Date Established 10/17/17 --  --    Bed Mobility PT LTG, Time to Achieve 2 wks --  --    Bed Mobility PT LTG, Activity Type all bed mobility --  --    Bed Mobility PT LTG, [place] Level independent --  --    Bed Mobility PT LTG, Date Goal Reviewed --  10/18/17 --    Bed Mobility PT LTG, Outcome --  --  goal partially met       Goal: Transfer Training Goal 1 LTG- PT  Outcome: Ongoing (interventions implemented as appropriate)    10/17/17 1108 10/18/17 0946 10/19/17 1639   Transfer Training PT LTG   Transfer Training PT LTG, Date Established 10/17/17 --  --    Transfer Training PT LTG, Time to Achieve 2 wks --  --    Transfer Training PT LTG, Activity Type bed to chair /chair to bed;sit to stand/stand to sit --  --    Transfer Training PT LTG, [place] Level independent --  --    Transfer Training PT LTG, Assist Device walker, rolling --  --    Transfer Training PT LTG, Date Goal Reviewed --  10/18/17 --    Transfer Training PT LTG, Outcome --  --  goal ongoing       Goal: Gait Training Goal LTG- PT  Outcome: Ongoing (interventions implemented as appropriate)    10/17/17 1108 10/18/17 0946 10/19/17 1639   Gait Training PT LTG   Gait Training Goal PT LTG, Date Established 10/17/17 --  --    Gait Training Goal PT LTG, Time  to Achieve 2 wks --  --    Gait Training Goal PT LTG, [place] Level independent --  --    Gait Training Goal PT LTG, Assist Device walker, rolling --  --    Gait Training Goal PT LTG, Distance to Achieve 40 --  --    Gait Training Goal PT LTG, Date Goal Reviewed --  10/18/17 --    Gait Training Goal PT LTG, Outcome --  --  goal ongoing

## 2017-10-19 NOTE — PLAN OF CARE
Problem: Patient Care Overview (Adult)  Goal: Plan of Care Review  Outcome: Ongoing (interventions implemented as appropriate)    10/19/17 1657   Coping/Psychosocial Response Interventions   Plan Of Care Reviewed With patient   Patient Care Overview   Progress improving   Outcome Evaluation   Outcome Summary/Follow up Plan Pt issued BUSTANLEY therabanramona HEP, demonstrates improved indepencence with bed mobility and transfer training. [facility] IP rehab as pt lives alone, however he desires home. He reports he is mostly w/c bound at home and request HHPT/OT and bariatric BSC.          Problem: Inpatient Occupational Therapy  Goal: Transfer Training Goal 1 LTG- OT  Outcome: Ongoing (interventions implemented as appropriate)    10/17/17 1331 10/19/17 1657   Transfer Training OT LTG   Transfer Training OT LTG, Time to Achieve by discharge --    Transfer Training OT LTG, Activity Type sit to stand/stand to sit;toilet --    Transfer Training OT LTG, [place] Level contact guard assist;verbal cues required --    Transfer Training OT LTG, Assist Device commode, bedside;walker, rolling --    Transfer Training OT LTG, Outcome --  goal met       Goal: Strength Goal LTG- OT  Outcome: Outcome(s) achieved Date Met:  10/19/17    10/17/17 1331 10/19/17 1657   Strength OT LTG   Strength Goal OT LTG, Time to Achieve by discharge --    Strength Goal OT LTG, Functional Goal Pt able to complete B UE HEP x10-15 reps to support ADL preformance --    Strength Goal OT LTG, Outcome --  goal met       Goal: Toileting Goal LTG- OT  Outcome: Ongoing (interventions implemented as appropriate)    10/17/17 1331 10/19/17 1657   Toileting OT LTG   Toileting Goal OT LTG, Time to Achieve by discharge --    Toileting Goal OT LTG, [place] Level set up required --    Toileting Goal OT LTG, Additional Goal from bariatric BSC (recommended for home) --    Toileting Goal OT LTG, Outcome --  goal ongoing       Goal: LB Dressing Goal LTG- OT  Outcome: Ongoing  (interventions implemented as appropriate)    10/17/17 1331 10/19/17 1657   LB Dressing OT LTG   LB Dressing Goal OT LTG, Time to Achieve by discharge --    LB Dressing Goal OT LTG, [place] Level set up required --    LB Dressing Goal OT LTG, Additional Goal Pt able to done L LE brace/shoe and R posthesis with set-up assist --    LB Dressing Goal OT LTG, Outcome --  goal ongoing

## 2017-10-19 NOTE — THERAPY TREATMENT NOTE
Acute Care - Occupational Therapy Treatment Note  [facility]     Patient Name: Jonah Hannon  : 1940  MRN: [id number]  Today's Date: 10/19/2017  Onset of Illness/Injury or Date of Surgery Date: 10/11/17  Date of Referral to OT: 10/16/17  Referring Physician: AUSTIN Poe      Admit Date: 10/11/2017    Visit Dx:     ICD-10-CM ICD-9-CM   1. Pharyngeal dysphagia R13.13 787.23   2. Colon cancer C18.9 153.9   3. Impaired mobility and ADLs Z74.09 799.89   4. Impaired functional mobility, balance, gait, and endurance Z74.09 V49.89     Patient Active Problem List   Diagnosis   • Iron deficiency anemia due to chronic blood loss   • Colon cancer   • Acquired hypothyroidism   • Type 2 diabetes mellitus with circulatory disorder   • Essential hypertension   • MARISOL (obstructive sleep apnea)   • Sacral wound   • S/P right hemicolectomy   • CAD in native artery-noncritical by [facility] 10/12/17   • Acute respiratory failure with hypoxia   • Pneumonia   • Atrial fibrillation   • Dysphagia   • Anemia   • Renal insufficiency             Adult Rehabilitation Note       10/19/17 1551 10/19/17 1550 10/19/17 1100    Rehab Assessment/Intervention    Discipline occupational therapist  -AR physical therapist  -MM speech language pathologist  -VANESSA,RK,RD2    Document Type therapy note (daily note)  -AR therapy note (daily note)  -MM therapy note (daily note)  -VANESSARK,RD2    Subjective Information no complaints;agree to therapy  -AR no complaints;agree to therapy  -MM agree to therapy;no complaints  -VANESSA,RK,RD2    Patient Effort, Rehab Treatment good  -AR good  -MM good  -VANESSARK,RD2    Symptoms Noted During/After Treatment none  -AR none  -MM     Precautions/Limitations fall precautions;brace on when up   hinged brace to LLE, prosthesis RLE, abdominal incision  -AR fall precautions;brace on when up  -MM     Precautions/Limitations, Vision  WFL  -MM     Precautions/Limitations, Hearing  WFL  -MM     Specific Treatment Considerations  brace and  shoe on LLE and prosthesis on R  -MM     Patient Response to Treatment  good  -MM     Recorded by [AR] Rosario Jessica, OT [MM] Radha Hodgson, PT [RD,RK,RD2] Emily Sullivan MS CCC-SLP (r) Olivia Mon, Speech Therapy Student (t) Emily Sullivan, MS CCC-SLP (c)    Vital Signs    Pre Systolic BP Rehab  116  -MM     Pre Treatment Diastolic BP  72  -MM     Pretreatment Heart Rate (beats/min)  85  -MM     O2 Delivery Pre Treatment  room air  -MM     Pre Patient Position  Supine  -MM     Intra Patient Position  Standing  -MM     Post Patient Position  Sitting  -MM     Recorded by  [MM] Radha Hodgson, PT     Pain Assessment    Pain Assessment Sky-Hernandez FACES  -AR Sky-Baker FACES  -MM No/denies pain  -RD,RK,RD2    Sky-Hernandez FACES Pain Rating 4  -AR 4  -MM     Pain Type Chronic pain  -AR Chronic pain  -MM     Pain Location Generalized  -AR Generalized  -MM     Pain Intervention(s) Repositioned;Ambulation/increased activity  -AR Repositioned  -MM     Response to Interventions tolerated  -AR      Recorded by [AR] Rosario Jessica, OT [MM] Radha Hodgson, PT [RD,RK,RD2] mEily Sullivan MS CCC-SLP (r) Olivia Mon, Speech Therapy Student (t) Emily Sullivan MS CCC-SLP (c)    Cognitive Assessment/Intervention    Current Cognitive/Communication Assessment functional  -AR functional  -MM     Orientation Status oriented x 4  -AR oriented x 4  -MM     Follows Commands/Answers Questions 100% of the time;able to follow multi-step instructions  -% of the time  -MM     Personal Safety WNL/WFL  -AR WNL/WFL  -MM     Personal Safety Interventions fall prevention program maintained  -AR fall prevention program maintained;gait belt;nonskid shoes/slippers when out of bed  -MM     Recorded by [AR] Rosario Jessica, OT [MM] Radha Hodgson, PT     Improve timing of pharyngeal response    To improve timing of pharyngeal response, patient will:   Swallow in timely way using three second prep;Swallow  in timely way using super-supraglottic swallow;Swallow in timely way using Mendelsohn maneuver;80%;with consistent cues  -RD,RK,RD2    Status: Improve timing of pharyngeal response   Progressing as expected  -RD,RK,RD2    Timing of Pharyngeal Response Progress   60%;with consistent cues;continue to adress  -RD,RK,RD2    Recorded by   [RD,RK,RD2] Emily Sullivan MS CCC-SLP (r) Olivia Mon, Speech Therapy Student (t) Emily Sullivan MS CCC-SLP (c)    Improve closure at entrance to airway    To improve closure at entrance to airway, patient will:   Complete super-supraglottic swallow;80%;with consistent cues  -RD,RK,RD2    Status: Improve closure at entrance to airway   Progressing as expected  -RD,RK,RD2    Closure at Entrance to Airway Progress   60%;with consistent cues;continue to adress  -RD,RK,RD2    Recorded by   [RD,RK,RD2] Emily Sullivan MS CCC-SLP (r) Olivia Mon, Speech Therapy Student (t) Emily Sullivan MS CCC-SLP (c)    Improve tongue base & pharyngeal wall squeeze    To improve tongue base & pharyngeal wall squeeze, patient will:   Complete effortful swallow;Complete tongue hold swallow;80%;with consistent cues  -RD,RK,RD2    Status: Improve tongue base & pharyngeal wall squeeze   Progressing as expected  -RD,RK,RD2    Tongue Base/Pharyngeal Wall Squeeze Progress   60%;with consistent cues;continue to adress  -RD,RK,RD2    Recorded by   [RD,RK,RD2] Emily Sullivan MS CCC-SLP (r) Olivia Mon Speech Therapy Student (t) Emily Sullivan MS CCC-SLP (c)    ROM (Range of Motion)    General ROM  no range of motion deficits identified  -MM     Recorded by  [MM] Radha Hodgson PT     MMT (Manual Muscle Testing)    General MMT Assessment  no strength deficits identified  -MM     Recorded by  [MM] Radha Hodgson PT     Bed Mobility, Assessment/Treatment    Bed Mobility, Assistive Device bed rails;head of bed elevated  -AR bed rails;head of bed elevated  -MM     Bed Mobility,  Scoot/Bridge, [place] conditional independence  -AR      Bed Mob, Supine to Sit, [place] conditional independence  -AR conditional independence  -MM     Bed Mob, Sit to Supine, [place] conditional independence  -AR      Recorded by [AR] Rosario Jessica, OT [MM] Radha Hodgson, PT     Transfer Assessment/Treatment    Transfers, Sit-Stand [place] contact guard assist;2 person assist required  -AR contact guard assist;2 person assist required  -MM     Transfers, Stand-Sit [place] contact guard assist;2 person assist required  -AR contact guard assist  -MM     Transfers, Sit-Stand-Sit, Assist Device elevated surface;standard walker  -AR      Transfer, Impairments strength decreased;impaired balance  -AR      Transfer, Comment Good safety awareness  -AR bed raised so hips were higher than knees  -MM     Recorded by [AR] Rosario Jessica, OT [MM] Radha Hodgson, PT     Gait Assessment/Treatment    Gait, [place] Level  contact guard assist;2 person assist required  -MM     Gait, Assistive Device  standard walker  -MM     Gait, Distance (Feet)  24  -MM     Gait, Gait Deviations  forward flexed posture;swing-to-stance ratio decreased;toe-to-floor clearance decreased  -MM     Gait, Safety Issues  balance decreased during turns  -MM     Gait, Comment  prosthesis and brace donned while pt sitting EOB  -MM     Recorded by  [MM] Radha Hodgson, PT     Functional Mobility    Functional Mobility- Ind. Level contact guard assist;2 person assist required  -AR      Functional Mobility- Device standard walker  -AR      Recorded by [AR] Rosario Jessica, OT      Lower Body Dressing Assessment/Training    LB Dressing Assess/Train, Clothing Type doffing:;donning:   L brace and R prosthesis  -AR      LB Dressing Assess/Train, Position edge of bed  -AR      LB Dressing Assess/Train, [place] moderate assist (50% patient effort)  -AR      LB Dressing Assess/Train, Comment pt reports he dons L  brace supine at home and requests assist this date for EC  -AR      Recorded by [AR] Rosario Jessica OT      Balance Skills Training    Sitting-Level of Assistance Independent  -AR Independent  -MM     Sitting-Balance Support Feet supported  -AR Feet unsupported  -MM     Sitting-Balance Activities  [place]unk control activities  -MM     Sitting # of Minutes  8  -MM     Standing-Level of Assistance Contact guard;x2  -AR Contact guard;x2  -MM     Static Standing Balance Support assistive device  -AR assistive device  -MM     Recorded by [AR] Rosario Jessica OT [MM] Radha Hodgson, PT     Therapy Exercises    Bilateral Upper Extremity --   blue theraband   -AR      BUE Resistance theraband  -AR      Recorded by [AR] Rosario Jessica OT      Positioning and Restraints    Pre-Treatment Position in bed  -AR in bed  -MM     Post Treatment Position bed  -AR bed  -MM     In Bed supine;call light within reach;encouraged to call for assist;exit alarm on;notified nsg  -AR sitting EOB;call light within reach;encouraged to call for assist;notified nsg  -MM     Recorded by [AR] Rosario Jessica OT [MM] Radha Hodgson, PT       10/18/17 0839 10/18/17 0829       Rehab Assessment/Intervention    Discipline occupational therapist  -AR physical therapy assistant  -AS     Document Type therapy note (daily note)  -AR therapy note (daily note)  -AS     Subjective Information agree to therapy;complains of;weakness;pain  -AR agree to therapy;complains of;weakness  -AS     Patient Effort, Rehab Treatment good  -AR good  -AS     Symptoms Noted During/After Treatment fatigue  -AR fatigue  -AS     Precautions/Limitations fall precautions;other (see comments)   R BKA with prosthesis, LLE hinged brace w/shoe  -AR      Equipment Issued to Patient --   pt has reacher, declined other AE items  -AR      Recorded by [AR] Rosario Jessica OT [AS] Zoe Yanes PTA     Pain Assessment    Pain Assessment 0-10  -AR No/denies pain   -AS     Pain Score 7  -AR 0  -AS     Post Pain Score 7  -AR 0  -AS     Pain Type Chronic pain  -AR      Pain Location Generalized  -AR      Pain Intervention(s) Repositioned;Ambulation/increased activity  -AR      Response to Interventions tolerated  -AR      Recorded by [AR] Rosario Jessica, NANDO [AS] Zoe Yanes PTA     Cognitive Assessment/Intervention    Current Cognitive/Communication Assessment functional  -AR functional  -AS     Orientation Status oriented x 4  -AR oriented x 4  -AS     Follows Commands/Answers Questions 100% of the time;able to follow multi-step instructions  -% of the time;able to follow single-step instructions  -AS     Personal Safety WNL/WFL  -AR WNL/WFL  -AS     Personal Safety Interventions fall prevention program maintained  -AR fall prevention program maintained;gait belt;nonskid shoes/slippers when out of bed;other (see comments)   R prosthesis, L LE brace/shoe  -AS     Recorded by [AR] Rosario Jessica, NANDO [AS] Zeo Yanes PTA     Bed Mobility, Assessment/Treatment    Bed Mobility, Assistive Device bed rails;head of bed elevated  -AR      Bed Mobility, Scoot/Bridge, [place] supervision required  -AR      Bed Mob, Supine to Sit, [place] supervision required  -AR supervision required  -AS     Bed Mob, Sit to Supine, [place] supervision required  -AR supervision required  -AS     Bed Mobility, Impairments strength decreased  -AR strength decreased  -AS     Bed Mobility, Comment  good technique  -AS     Recorded by [AR] Rosario Jessica OT [AS] Zoe Yanes PTA     Transfer Assessment/Treatment    Transfers, Sit-Stand [place] verbal cues required;minimum assist (75% patient effort);2 person assist required  -AR verbal cues required;minimum assist (75% patient effort);2 person assist required  -AS     Transfers, Stand-Sit [place] verbal cues required;minimum assist (75% patient effort);2 person assist required  -AR verbal cues  required;minimum assist (75% patient effort);2 person assist required  -AS     Transfers, Sit-Stand-Sit, Assist Device rolling walker  -AR rolling walker  -AS     Transfer, Safety Issues  weight-shifting ability decreased  -AS     Transfer, Impairments strength decreased;impaired balance  -AR strength decreased;impaired balance  -AS     Transfer, Comment cues for hand palcement and bed approach  -AR verbal cues to keep walker close and for posture. Limited by weakness.  -AS     Recorded by [AR] Rosario Jessica OT [AS] Zoe Yanes PTA     Gait Assessment/Treatment    Gait, [place] Level  verbal cues required;minimum assist (75% patient effort);2 person assist required  -AS     Gait, Assistive Device  rolling walker  -AS     Gait, Distance (Feet)  32  -AS     Gait, Gait Deviations  huang decreased;forward flexed posture;step length decreased  -AS     Gait, Safety Issues  step length decreased;weight-shifting ability decreased  -AS     Gait, Impairments  strength decreased;impaired balance  -AS     Gait, Comment  verbal cues to stay inside walker and for posture.  -AS     Recorded by  [AS] Zoe Yanes PTA     Upper Body Dressing Assessment/Training    UB Dressing Assess/Train, Clothing Type doffing:;donning:;hospital gown  -AR      UB Dressing Assess/Train, Position sitting  -AR      UB Dressing Assess/Train, [place] minimum assist (75% patient effort);verbal cues required  -AR      Recorded by [AR] Rosario Jessica OT      Lower Body Dressing Assessment/Training    LB Dressing Assess/Train, Clothing Type donning:;doffing:   RLE prosthesis, LLE hinged brace  -AR      LB Dressing Assess/Train, Assist Device --   pt declined LH shoe horn   -AR      LB Dressing Assess/Train, Position edge of bed  -AR      LB Dressing Assess/Train, [place] maximum assist (25% patient effort);minimum assist (75% patient effort)   min assist RLE prosthesis, LLE hinged brace  -AR      LB Dressing  Assess/Train, Impairments impaired balance;strength decreased;motor control impaired  -AR      LB Dressing Assess/Train, Comment Pt declined AE, he required min assist for prosthesis, extensive assist to don LLE hinged brace and able to tell OT how to don/doff  -AR      Recorded by [AR] Rosario Jessica, OT      Positioning and Restraints    Pre-Treatment Position in bed  -AR in bed  -AS     Post Treatment Position bed  -AR bed  -AS     In Bed supine;call light within reach;encouraged to call for assist;exit alarm on  -AR supine;call light within reach;encouraged to call for assist;exit alarm on  -AS     Recorded by [AR] Rosario Jessica, OT [AS] Zoe Yanes, PTA       User Key  (r) = Recorded By, (t) = Taken By, (c) = Cosigned By    Initials Name Effective Dates    MM Radha TABITHA Hodgson, PT 06/19/15 -     AR Rosario Jessica, OT 06/22/15 -     AS Zoe Yanes, PTA 06/22/15 -     VANESSA Sullivan MS CCC-SLP 09/27/17 -     KELLY Mon, Speech Therapy Student 08/24/17 -                 OT Goals       10/19/17 1657 10/18/17 1049 10/17/17 1331    Transfer Training OT LTG    Transfer Training OT LTG, Time to Achieve   by discharge  -TB    Transfer Training OT LTG, Activity Type   sit to stand/stand to sit;toilet  -TB    Transfer Training OT LTG, [place] Level   contact guard assist;verbal cues required  -TB    Transfer Training OT LTG, Assist Device   commode, bedside;walker, rolling  -TB    Transfer Training OT LTG, Outcome goal met  -AR goal ongoing  -AR     Strength OT LTG    Strength Goal OT LTG, Time to Achieve   by discharge  -TB    Strength Goal OT LTG, Functional Goal   Pt able to complete B UE HEP x10-15 reps to support ADL preformance  -TB    Strength Goal OT LTG, Outcome goal met  -AR goal ongoing  -AR     Toileting OT LTG    Toileting Goal OT LTG, Time to Achieve   by discharge  -TB    Toileting Goal OT LTG, [place] Level   set up required  -TB    Toileting Goal OT LTG,  Additional Goal   from bariatric BSC (recommended for home)  -TB    Toileting Goal OT LTG, Outcome goal ongoing  -AR goal ongoing  -AR     LB Dressing OT LTG    LB Dressing Goal OT LTG, Time to Achieve   by discharge  -TB    LB Dressing Goal OT LTG, [place] Level   set up required  -TB    LB Dressing Goal OT LTG, Additional Goal   Pt able to done L LE brace/shoe and R posthesis with set-up assist  -TB    LB Dressing Goal OT LTG, Outcome goal ongoing  -AR goal ongoing  -AR       User Key  (r) = Recorded By, (t) = Taken By, (c) = Cosigned By    Initials Name Provider Type    TB Janeen De Leon, OT Occupational Therapist    AR Rosario Jessica, OT Occupational Therapist          Occupational Therapy Education     Title: PT OT SLP Therapies (Done)     Topic: Occupational Therapy (Done)     Point: ADL training (Done)    Description: Instruct learner(s) on proper safety adaptation and remediation techniques during self care or transfers.   Instruct in proper use of assistive devices.    Learning Progress Summary    Learner Readiness Method Response Comment Documented by Status   Patient HARSHIL Whitehead D,H DICKSON SORTO Reviewed home safety, ADL retrianing, transfer training, BUE HEP AR 10/19/17 1657 Done    HARSHIL Whitehead D VU,NR reviewed ADL retraining, transfer traaining, bed mobility AR 10/18/17 1048 Done    Acceptance E VU,NR Education initiated for benefits of OOB activity/therapy, role of OT, logroll sequencing for pain mgmt with bed mobility TB 10/17/17 1038 Done               Point: Home exercise program (Done)    Description: Instruct learner(s) on appropriate technique for monitoring, assisting and/or progressing therapeutic exercises/activities.    Learning Progress Summary    Learner Readiness Method Response Comment Documented by Status   Patient HARSHIL Whitehead D,H MACARIO,DICSKON Reviewed home safety, ADL retrianing, transfer training, BUE HEP AR 10/19/17 1657 Done    HARSHIL Whitehead D VU,NR reviewed ADL retraining, transfer  traaining, bed mobility AR 10/18/17 1048 Done                      User Key     Initials Effective Dates Name Provider Type Discipline    TB 06/22/15 -  Janeen De Leon OT Occupational Therapist OT    AR 06/22/15 -  Rosraio Jessica OT Occupational Therapist OT                  OT Recommendation and Plan  Anticipated Equipment Needs At Discharge: bedside commode  Anticipated Discharge Disposition: home with assist  Demonstrates Need for Referral to Another Service: home health care (OT/PT to regain strength and ADL independence)  Therapy Frequency: daily  Plan of Care Review  Plan Of Care Reviewed With: patient  Progress: improving  Outcome Summary/Follow up Plan: Pt issued VISHNU pedro HEP, demonstrates improved indepencence with bed mobility and transfer training. [facility] rehab as pt lives alone, however he desires home. He reports he is mostly w/c bound at home and request HHPT/OT and bariatric BSC.         Outcome Measures       10/19/17 1551 10/19/17 1550 10/18/17 0839    How much help from another person do you currently need...    Turning from your back to your side while in flat bed without using bedrails?  4  -MM     Moving from lying on back to sitting on the side of a flat bed without bedrails?  4  -MM     Moving to and from a bed to a chair (including a wheelchair)?  3  -MM     Standing up from a chair using your arms (e.g., wheelchair, bedside chair)?  3  -MM     Climbing 3-5 steps with a railing?  1  -MM     To walk in hospital room?  3  -MM     AM-PAC 6 Clicks Score  18  -MM     How much help from another is currently needed...    Putting on and taking off regular lower body clothing? 2  -AR  2  -AR    Bathing (including washing, rinsing, and drying) 2  -AR  2  -AR    Toileting (which includes using toilet bed pan or urinal) 2  -AR  2  -AR    Putting on and taking off regular upper body clothing 3  -AR  3  -AR    Taking care of personal grooming (such as brushing teeth) 4  -AR  3  -AR     Eating meals 4  -AR  3  -AR    Score 17  -AR  15  -AR    Functional Assessment    Outcome Measure Options AM-PAC 6 Clicks Daily Activity (OT)  -AR AM-PAC 6 Clicks Basic Mobility (PT)  -MM AM-PAC 6 Clicks Daily Activity (OT)  -AR      10/18/17 0829 10/17/17 0949 10/17/17 0948    How much help from another person do you currently need...    Turning from your back to your side while in flat bed without using bedrails? 3  -AS  3  -KM    Moving from lying on back to sitting on the side of a flat bed without bedrails? 3  -AS  3  -KM    Moving to and from a bed to a chair (including a wheelchair)? 3  -AS  2  -KM    Standing up from a chair using your arms (e.g., wheelchair, bedside chair)? 3  -AS  2  -KM    Climbing 3-5 steps with a railing? 1  -AS  1  -KM    To walk in hospital room? 3  -AS  2  -KM    AM-PAC 6 Clicks Score 16  -AS  13  -KM    How much help from another is currently needed...    Putting on and taking off regular lower body clothing?  2  -TB     Bathing (including washing, rinsing, and drying)  2  -TB     Toileting (which includes using toilet bed pan or urinal)  2  -TB     Putting on and taking off regular upper body clothing  3  -TB     Taking care of personal grooming (such as brushing teeth)  3  -TB     Eating meals  3  -TB     Score  15  -TB     Functional Assessment    Outcome Measure Options AM-PAC 6 Clicks Basic Mobility (PT)  -AS AM-PAC 6 Clicks Daily Activity (OT)  -TB AM-PAC 6 Clicks Basic Mobility (PT)  -KM      User Key  (r) = Recorded By, (t) = Taken By, (c) = Cosigned By    Initials Name Provider Type    TB Janeen De Leon, OT Occupational Therapist    KM Angela Fitch, PT Physical Therapist    MM Radha Hodgson, PT Physical Therapist    AR Rosario Jessica, OT Occupational Therapist    AS Zoe Yanes, PTA Physical Therapy Assistant           Time Calculation:         Time Calculation- OT       10/19/17 1700          Time Calculation- OT    OT Start Time 1551  -AR       Total Timed Code Minutes- OT 24 minute(s)  -AR      OT Received On 10/19/17  -AR      OT Goal Re-Cert Due Date 10/27/17  -AR        User Key  (r) = Recorded By, (t) = Taken By, (c) = Cosigned By    Initials Name Provider Type    CRYSTAL Jessica OT Occupational Therapist           Therapy Charges for Today     Code Description Service Date Service Provider Modifiers Qty    38468877773 HC OT THERAPEUTIC ACT EA 15 MIN 10/18/2017 Rosario Jessica OT GO 1    56599370934 HC OT THERAPEUTIC ACT EA 15 MIN 10/19/2017 Rosario Jessica OT GO 2               Rosario Jessica OT  10/19/2017

## 2017-10-19 NOTE — PLAN OF CARE
Problem: Patient Care Overview (Adult)  Goal: Plan of Care Review  Outcome: Ongoing (interventions implemented as appropriate)    10/19/17 0409   Coping/Psychosocial Response Interventions   Plan Of Care Reviewed With patient   Patient Care Overview   Progress progress toward functional goals as expected   Outcome Evaluation   Outcome Summary/Follow up Plan VSS. pt slept well through out night. stool sample sent to lab, testing for c-diff. D/C home when medically ready.

## 2017-10-19 NOTE — THERAPY TREATMENT NOTE
Acute Care - Physical Therapy Treatment Note  [facility]     Patient Name: Jonah Hannon  : 1940  MRN: [id number]  Today's Date: 10/19/2017  Onset of Illness/Injury or Date of Surgery Date: 10/11/17  Date of Referral to PT: 10/16/17  Referring Physician: AUSTIN Poe    Admit Date: 10/11/2017    Visit Dx:    ICD-10-CM ICD-9-CM   1. Pharyngeal dysphagia R13.13 787.23   2. Colon cancer C18.9 153.9   3. Impaired mobility and ADLs Z74.09 799.89   4. Impaired functional mobility, balance, gait, and endurance Z74.09 V49.89     Patient Active Problem List   Diagnosis   • Iron deficiency anemia due to chronic blood loss   • Colon cancer   • Acquired hypothyroidism   • Type 2 diabetes mellitus with circulatory disorder   • Essential hypertension   • MARISOL (obstructive sleep apnea)   • Sacral wound   • S/P right hemicolectomy   • CAD in native artery-noncritical by [facility] 10/12/17   • Acute respiratory failure with hypoxia   • Pneumonia   • Atrial fibrillation   • Dysphagia   • Anemia   • Renal insufficiency               Adult Rehabilitation Note       10/19/17 1550 10/19/17 1100 10/18/17 0839    Rehab Assessment/Intervention    Discipline physical therapist  -MM speech language pathologist  -VANESSA,KELLY,RD2 occupational therapist  -AR    Document Type therapy note (daily note)  -MM therapy note (daily note)  -KELLY MENDEZ RD2 therapy note (daily note)  -AR    Subjective Information no complaints;agree to therapy  -MM agree to therapy;no complaints  -KELLY MENDEZRD2 agree to therapy;complains of;weakness;pain  -AR    Patient Effort, Rehab Treatment good  -MM good  -KELLY MENDEZ RD2 good  -AR    Symptoms Noted During/After Treatment none  -MM  fatigue  -AR    Precautions/Limitations fall precautions;brace on when up  -MM  fall precautions;other (see comments)   R BKA with prosthesis, LLE hinged brace w/shoe  -AR    Precautions/Limitations, Vision WFL  -MM      Precautions/Limitations, Hearing WFL  -MM      Specific Treatment Considerations brace  and shoe on LLE and prosthesis on R  -MM      Patient Response to Treatment good  -MM      Equipment Issued to Patient   --   pt has reacher, declined other AE items  -AR    Recorded by [MM] Radha Hodgson, PT [RD,RK,RD2] Emily Sullivan, MS CCC-SLP (r) Olivia Mon, Speech Therapy Student (t) Emily Sullivan, MS CCC-SLP (c) [AR] Rosario Jessica, OT    Vital Signs    Pre Systolic BP Rehab 116  -MM      Pre Treatment Diastolic BP 72  -MM      Pretreatment Heart Rate (beats/min) 85  -MM      O2 Delivery Pre Treatment room air  -MM      Pre Patient Position Supine  -MM      Intra Patient Position Standing  -MM      Post Patient Position Sitting  -MM      Recorded by [MM] Radha Hodgson, PT      Pain Assessment    Pain Assessment Sky-Hernandez FACES  -MM No/denies pain  -RD,RK,RD2 0-10  -AR    Sky-Hernandez FACES Pain Rating 4  -MM      Pain Score   7  -AR    Post Pain Score   7  -AR    Pain Type Chronic pain  -MM  Chronic pain  -AR    Pain Location Generalized  -MM  Generalized  -AR    Pain Intervention(s) Repositioned  -MM  Repositioned;Ambulation/increased activity  -AR    Response to Interventions   tolerated  -AR    Recorded by [MM] Radha Hodgson PT [RD,RK,RD2] Emily Sullivan, MS CCC-SLP (r) Olivia Mon, Speech Therapy Student (t) Emily Sullivan, MS CCC-SLP (c) [AR] Rosario Jessica, OT    Cognitive Assessment/Intervention    Current Cognitive/Communication Assessment functional  -MM  functional  -AR    Orientation Status oriented x 4  -MM  oriented x 4  -AR    Follows Commands/Answers Questions 100% of the time  -MM  100% of the time;able to follow multi-step instructions  -AR    Personal Safety WNL/WFL  -MM  WNL/WFL  -AR    Personal Safety Interventions fall prevention program maintained;gait belt;nonskid shoes/slippers when out of bed  -MM  fall prevention program maintained  -AR    Recorded by [MM] Radha Hodgson, PT  [AR] Rosario Jessica, OT    Improve timing of pharyngeal  response    To improve timing of pharyngeal response, patient will:  Swallow in timely way using three second prep;Swallow in timely way using super-supraglottic swallow;Swallow in timely way using Mendelsohn maneuver;80%;with consistent cues  -RD,RK,RD2     Status: Improve timing of pharyngeal response  Progressing as expected  -RD,RK,RD2     Timing of Pharyngeal Response Progress  60%;with consistent cues;continue to adress  -RD,RK,RD2     Recorded by  [RD,RK,RD2] Emily Sullivan MS CCC-SLP (r) Olivia Mon, Speech Therapy Student (t) Emily Sullivan MS CCC-SLP (c)     Improve closure at entrance to airway    To improve closure at entrance to airway, patient will:  Complete super-supraglottic swallow;80%;with consistent cues  -RD,RK,RD2     Status: Improve closure at entrance to airway  Progressing as expected  -RD,RK,RD2     Closure at Entrance to Airway Progress  60%;with consistent cues;continue to adress  -RD,RK,RD2     Recorded by  [RD,RK,RD2] Emily Sullivan MS CCC-SLP (r) Olivia Mon Speech Therapy Student (t) Emily Sullivan MS CCC-SLP (c)     Improve tongue base & pharyngeal wall squeeze    To improve tongue base & pharyngeal wall squeeze, patient will:  Complete effortful swallow;Complete tongue hold swallow;80%;with consistent cues  -RD,RK,RD2     Status: Improve tongue base & pharyngeal wall squeeze  Progressing as expected  -RD,RK,RD2     Tongue Base/Pharyngeal Wall Squeeze Progress  60%;with consistent cues;continue to adress  -RD,RK,RD2     Recorded by  [RD,RK,RD2] Emily Sullivan MS CCC-SLP (r) Olivia Mon Speech Therapy Student (t) Emily Sullivan MS CCC-SLP (c)     ROM (Range of Motion)    General ROM no range of motion deficits identified  -MM      Recorded by [MM] Radha Hodgson PT      MMT (Manual Muscle Testing)    General MMT Assessment no strength deficits identified  -MM      Recorded by [MM] Radha Hodgson PT      Bed Mobility, Assessment/Treatment     Bed Mobility, Assistive Device bed rails;head of bed elevated  -MM  bed rails;head of bed elevated  -AR    Bed Mobility, Scoot/Bridge, [place]   supervision required  -AR    Bed Mob, Supine to Sit, [place] conditional independence  -MM  supervision required  -AR    Bed Mob, Sit to Supine, [place]   supervision required  -AR    Bed Mobility, Impairments   strength decreased  -AR    Recorded by [MM] Radha Hodgson, PT  [AR] Rosario Jessica, OT    Transfer Assessment/Treatment    Transfers, Sit-Stand [place] contact guard assist;2 person assist required  -MM  verbal cues required;minimum assist (75% patient effort);2 person assist required  -AR    Transfers, Stand-Sit [place] contact guard assist  -MM  verbal cues required;minimum assist (75% patient effort);2 person assist required  -AR    Transfers, Sit-Stand-Sit, Assist Device   rolling walker  -AR    Transfer, Impairments   strength decreased;impaired balance  -AR    Transfer, Comment bed raised so hips were higher than knees  -MM  cues for hand palcement and bed approach  -AR    Recorded by [MM] Radha Hodgson, PT  [AR] Rosario Jessica, OT    Gait Assessment/Treatment    Gait, [place] Level contact guard assist;2 person assist required  -MM      Gait, Assistive Device standard walker  -MM      Gait, Distance (Feet) 24  -MM      Gait, Gait Deviations forward flexed posture;swing-to-stance ratio decreased;toe-to-floor clearance decreased  -MM      Gait, Safety Issues balance decreased during turns  -MM      Gait, Comment prosthesis and brace donned while pt sitting EOB  -MM      Recorded by [MM] Radha Hodgson, PT      Upper Body Dressing Assessment/Training    UB Dressing Assess/Train, Clothing Type   doffing:;donning:;hospital gown  -AR    UB Dressing Assess/Train, Position   sitting  -AR    UB Dressing Assess/Train, [place]   minimum assist (75% patient effort);verbal cues required  -AR    Recorded by   [AR]  Rosario Jessica, OT    Lower Body Dressing Assessment/Training    LB Dressing Assess/Train, Clothing Type   donning:;doffing:   RLE prosthesis, LLE hinged brace  -AR    LB Dressing Assess/Train, Assist Device   --   pt declined LH shoe horn   -AR    LB Dressing Assess/Train, Position   edge of bed  -AR    LB Dressing Assess/Train, [place]   maximum assist (25% patient effort);minimum assist (75% patient effort)   min assist RLE prosthesis, LLE hinged brace  -AR    LB Dressing Assess/Train, Impairments   impaired balance;strength decreased;motor control impaired  -AR    LB Dressing Assess/Train, Comment   Pt declined AE, he required min assist for prosthesis, extensive assist to don LLE hinged brace and able to tell OT how to don/doff  -AR    Recorded by   [AR] Rosario Jessica OT    Balance Skills Training    Sitting-Level of Assistance Independent  -MM      Sitting-Balance Support Feet unsupported  -MM      Sitting-Balance Activities Trunk control activities  -MM      Sitting # of Minutes 8  -MM      Standing-Level of Assistance Contact guard;x2  -MM      Static Standing Balance Support assistive device  -MM      Recorded by [MM] Radha Hodgson, PT      Positioning and Restraints    Pre-Treatment Position in bed  -MM  in bed  -AR    Post Treatment Position bed  -MM  bed  -AR    In Bed sitting EOB;call light within reach;encouraged to call for assist;notified nsg  -MM  supine;call light within reach;encouraged to call for assist;exit alarm on  -AR    Recorded by [MM] Radha Hodgson, PT  [AR] Rosario Jessica OT      10/18/17 0829          Rehab Assessment/Intervention    Discipline physical therapy assistant  -AS      Document Type therapy note (daily note)  -AS      Subjective Information agree to therapy;complains of;weakness  -AS      Patient Effort, Rehab Treatment good  -AS      Symptoms Noted During/After Treatment fatigue  -AS      Recorded by [AS] Zoe Yanes PTA      Pain  Assessment    Pain Assessment No/denies pain  -AS      Pain Score 0  -AS      Post Pain Score 0  -AS      Recorded by [AS] Zoe Yanes PTA      Cognitive Assessment/Intervention    Current Cognitive/Communication Assessment functional  -AS      Orientation Status oriented x 4  -AS      Follows Commands/Answers Questions 100% of the time;able to follow single-step instructions  -AS      Personal Safety WNL/WFL  -AS      Personal Safety Interventions fall prevention program maintained;gait belt;nonskid shoes/slippers when out of bed;other (see comments)   R prosthesis, L LE brace/shoe  -AS      Recorded by [AS] Zoe Yanes PTA      Bed Mobility, Assessment/Treatment    Bed Mob, Supine to Sit, [place] supervision required  -AS      Bed Mob, Sit to Supine, [place] supervision required  -AS      Bed Mobility, Impairments strength decreased  -AS      Bed Mobility, Comment good technique  -AS      Recorded by [AS] Zoe Yanes PTA      Transfer Assessment/Treatment    Transfers, Sit-Stand [place] verbal cues required;minimum assist (75% patient effort);2 person assist required  -AS      Transfers, Stand-Sit [place] verbal cues required;minimum assist (75% patient effort);2 person assist required  -AS      Transfers, Sit-Stand-Sit, Assist Device rolling walker  -AS      Transfer, Safety Issues weight-shifting ability decreased  -AS      Transfer, Impairments strength decreased;impaired balance  -AS      Transfer, Comment verbal cues to keep walker close and for posture. Limited by weakness.  -AS      Recorded by [AS] Zoe Yanes PTA      Gait Assessment/Treatment    Gait, [place] Level verbal cues required;minimum assist (75% patient effort);2 person assist required  -AS      Gait, Assistive Device rolling walker  -AS      Gait, Distance (Feet) 32  -AS      Gait, Gait Deviations huang decreased;forward flexed posture;step length decreased  -AS      Gait, Safety  Issues step length decreased;weight-shifting ability decreased  -AS      Gait, Impairments strength decreased;impaired balance  -AS      Gait, Comment verbal cues to stay inside walker and for posture.  -AS      Recorded by [AS] Zoe Yanes PTA      Positioning and Restraints    Pre-Treatment Position in bed  -AS      Post Treatment Position bed  -AS      In Bed supine;call light within reach;encouraged to call for assist;exit alarm on  -AS      Recorded by [AS] Zoe Yanes PTA        User Key  (r) = Recorded By, (t) = Taken By, (c) = Cosigned By    Initials Name Effective Dates    MM Radha Hodgson, PT 06/19/15 -     AR Rosario Jessica, OT 06/22/15 -     AS Zoe Yanes, PTA 06/22/15 -     VANESSA Sullivan, MS CCC-SLP 09/27/17 -     KELLY Mon, Speech Therapy Student 08/24/17 -                 IP PT Goals       10/19/17 1639 10/18/17 0946 10/17/17 1108    Bed Mobility PT LTG    Bed Mobility PT LTG, Date Established   10/17/17  -KM    Bed Mobility PT LTG, Time to Achieve   2 wks  -KM    Bed Mobility PT LTG, Activity Type   all bed mobility  -KM    Bed Mobility PT LTG, [place] Level   independent  -KM    Bed Mobility PT LTG, Date Goal Reviewed  10/18/17  -AS     Bed Mobility PT LTG, Outcome goal partially met  -MM goal ongoing  -AS     Transfer Training PT LTG    Transfer Training PT LTG, Date Established   10/17/17  -KM    Transfer Training PT LTG, Time to Achieve   2 wks  -KM    Transfer Training PT LTG, Activity Type   bed to chair /chair to bed;sit to stand/stand to sit  -KM    Transfer Training PT LTG, [place] Level   independent  -KM    Transfer Training PT LTG, Assist Device   walker, rolling  -KM    Transfer Training PT  LTG, Date Goal Reviewed  10/18/17  -AS     Transfer Training PT LTG, Outcome goal ongoing  -MM goal ongoing  -AS     Gait Training PT LTG    Gait Training Goal PT LTG, Date Established   10/17/17  -KM    Gait Training Goal PT LTG, Time to  Achieve   2 wks  -KM    Gait Training Goal PT LTG, [place] Level   independent  -KM    Gait Training Goal PT LTG, Assist Device   walker, rolling  -KM    Gait Training Goal PT LTG, Distance to Achieve   40  -KM    Gait Training Goal PT LTG, Date Goal Reviewed  10/18/17  -AS     Gait Training Goal PT LTG, Outcome goal ongoing  -MM goal ongoing  -AS       User Key  (r) = Recorded By, (t) = Taken By, (c) = Cosigned By    Initials Name Provider Type    KM Angela Fitch, PT Physical Therapist    MM Radha Hodgson, PT Physical Therapist    AS Zoe Yanes, PTA Physical Therapy Assistant          Physical Therapy Education     Title: PT OT SLP Therapies (Done)     Topic: Physical Therapy (Done)     Point: Mobility training (Done)    Learning Progress Summary    Learner Readiness Method Response Comment Documented by Status   Patient Acceptance E DU,VU  MM 10/19/17 1638 Done    Acceptance E NR  AS 10/18/17 0946 Active    Acceptance E VU discussed plan of care and d/c planning KM 10/17/17 1114 Done               Point: Home exercise program (Done)    Learning Progress Summary    Learner Readiness Method Response Comment Documented by Status   Patient Acceptance E DU,VU  MM 10/19/17 1638 Done    Acceptance E NR  AS 10/18/17 0946 Active    Acceptance E VU discussed plan of care and d/c planning KM 10/17/17 1114 Done               Point: Body mechanics (Done)    Learning Progress Summary    Learner Readiness Method Response Comment Documented by Status   Patient Acceptance E DU,VU  MM 10/19/17 1638 Done    Acceptance E NR  AS 10/18/17 0946 Active    Acceptance E VU discussed plan of care and d/c planning KM 10/17/17 1114 Done               Point: Precautions (Done)    Learning Progress Summary    Learner Readiness Method Response Comment Documented by Status   Patient Acceptance E DU,VU  MM 10/19/17 1638 Done    Acceptance E NR  AS 10/18/17 0946 Active    Acceptance E VU discussed plan of care and d/c  planning  10/17/17 1114 Done                      User Key     Initials Effective Dates Name Provider Type Discipline    KM 06/19/15 -  Angela Fitch, PT Physical Therapist PT    MM 06/19/15 -  Radha Hodgson, PT Physical Therapist PT    AS 06/22/15 -  Zoe Yanes, FLYNN Physical Therapy Assistant PT                    PT Recommendation and Plan  Anticipated Discharge Disposition: home with assist, home with home health  PT Frequency: daily  Plan of Care Review  Plan Of Care Reviewed With: patient  Progress: improving  Outcome Summary/Follow up Plan: pt independent in supine to sit and nearly ind sit to stand, Doing well, Ambulation limited by chronic pain and weak LE's. Recommend rehab at disch          Outcome Measures       10/19/17 1550 10/18/17 0839 10/18/17 0829    How much help from another person do you currently need...    Turning from your back to your side while in flat bed without using bedrails? 4  -MM  3  -AS    Moving from lying on back to sitting on the side of a flat bed without bedrails? 4  -MM  3  -AS    Moving to and from a bed to a chair (including a wheelchair)? 3  -MM  3  -AS    Standing up from a chair using your arms (e.g., wheelchair, bedside chair)? 3  -MM  3  -AS    Climbing 3-5 steps with a railing? 1  -MM  1  -AS    To walk in hospital room? 3  -MM  3  -AS    AM-PAC 6 Clicks Score 18  -MM  16  -AS    How much help from another is currently needed...    Putting on and taking off regular lower body clothing?  2  -AR     Bathing (including washing, rinsing, and drying)  2  -AR     Toileting (which includes using toilet bed pan or urinal)  2  -AR     Putting on and taking off regular upper body clothing  3  -AR     Taking care of personal grooming (such as brushing teeth)  3  -AR     Eating meals  3  -AR     Score  15  -AR     Functional Assessment    Outcome Measure Options AM-PAC 6 Clicks Basic Mobility (PT)  -MM AM-PAC 6 Clicks Daily Activity (OT)  -AR AM-PAC 6 Clicks  Basic Mobility (PT)  -AS      10/17/17 0949 10/17/17 0948       How much help from another person do you currently need...    Turning from your back to your side while in flat bed without using bedrails?  3  -KM     Moving from lying on back to sitting on the side of a flat bed without bedrails?  3  -KM     Moving to and from a bed to a chair (including a wheelchair)?  2  -KM     Standing up from a chair using your arms (e.g., wheelchair, bedside chair)?  2  -KM     Climbing 3-5 steps with a railing?  1  -KM     To walk in hospital room?  2  -KM     AM-PAC 6 Clicks Score  13  -KM     How much help from another is currently needed...    Putting on and taking off regular lower body clothing? 2  -TB      Bathing (including washing, rinsing, and drying) 2  -TB      Toileting (which includes using toilet bed pan or urinal) 2  -TB      Putting on and taking off regular upper body clothing 3  -TB      Taking care of personal grooming (such as brushing teeth) 3  -TB      Eating meals 3  -TB      Score 15  -TB      Functional Assessment    Outcome Measure Options AM-PAC 6 Clicks Daily Activity (OT)  -TB AM-PAC 6 Clicks Basic Mobility (PT)  -KM       User Key  (r) = Recorded By, (t) = Taken By, (c) = Cosigned By    Initials Name Provider Type    TB Janeen De Leon, OT Occupational Therapist    KM Angela Fitch, PT Physical Therapist    MM Rahda Hodgson, PT Physical Therapist    AR Rosario Jessica, OT Occupational Therapist    AS Zoe Yanes, PTA Physical Therapy Assistant           Time Calculation:         PT Charges       10/19/17 1643          Time Calculation    Start Time 1550  -MM      PT Received On 10/19/17  -MM      PT Goal Re-Cert Due Date 10/18/17  -MM      Time Calculation- PT    Total Timed Code Minutes- PT 20 minute(s)  -MM        User Key  (r) = Recorded By, (t) = Taken By, (c) = Cosigned By    Initials Name Provider Type    MARS Hodgson, PT Physical Therapist           Therapy Charges for Today     Code Description Service Date Service Provider Modifiers Qty    [id number] HC GAIT TRAINING EA 15 MIN 10/19/2017 Radha Hodgson, PT GP 1          PT G-Codes  Outcome Measure Options: AM-PAC 6 Clicks Basic Mobility (PT)    Radha Hodgson, PT  10/19/2017

## 2017-10-20 ENCOUNTER — APPOINTMENT (OUTPATIENT)
Dept: GENERAL RADIOLOGY | Facility: HOSPITAL | Age: 77
End: 2017-10-20

## 2017-10-20 VITALS
BODY MASS INDEX: 38.65 KG/M2 | DIASTOLIC BLOOD PRESSURE: 86 MMHG | TEMPERATURE: 98.2 F | WEIGHT: 301.2 LBS | SYSTOLIC BLOOD PRESSURE: 159 MMHG | HEIGHT: 74 IN | HEART RATE: 90 BPM | OXYGEN SATURATION: 94 % | RESPIRATION RATE: 18 BRPM

## 2017-10-20 PROBLEM — J96.01 ACUTE RESPIRATORY FAILURE WITH HYPOXIA (HCC): Status: RESOLVED | Noted: 2017-10-16 | Resolved: 2017-10-20

## 2017-10-20 LAB
ANION GAP SERPL CALCULATED.3IONS-SCNC: 9 MMOL/L (ref 3–11)
BACTERIA FLD CULT: NORMAL
BASOPHILS # BLD AUTO: 0.04 10*3/MM3 (ref 0–0.2)
BASOPHILS NFR BLD AUTO: 0.7 % (ref 0–1)
BUN BLD-MCNC: 12 MG/DL (ref 9–23)
BUN/CREAT SERPL: 13.3 (ref 7–25)
CALCIUM SPEC-SCNC: 9.1 MG/DL (ref 8.7–10.4)
CHLORIDE SERPL-SCNC: 103 MMOL/L (ref 99–109)
CO2 SERPL-SCNC: 25 MMOL/L (ref 20–31)
CREAT BLD-MCNC: 0.9 MG/DL (ref 0.6–1.3)
DEPRECATED RDW RBC AUTO: 52.9 FL (ref 37–54)
EOSINOPHIL # BLD AUTO: 0.34 10*3/MM3 (ref 0–0.3)
EOSINOPHIL NFR BLD AUTO: 5.6 % (ref 0–3)
ERYTHROCYTE [DISTWIDTH] IN BLOOD BY AUTOMATED COUNT: 16 % (ref 11.3–14.5)
GFR SERPL CREATININE-BSD FRML MDRD: 82 ML/MIN/1.73
GLUCOSE BLD-MCNC: 152 MG/DL (ref 70–100)
GLUCOSE BLDC GLUCOMTR-MCNC: 138 MG/DL (ref 70–130)
GLUCOSE BLDC GLUCOMTR-MCNC: 146 MG/DL (ref 70–130)
HCT VFR BLD AUTO: 30.2 % (ref 38.9–50.9)
HGB BLD-MCNC: 9.3 G/DL (ref 13.1–17.5)
IMM GRANULOCYTES # BLD: 0.01 10*3/MM3 (ref 0–0.03)
IMM GRANULOCYTES NFR BLD: 0.2 % (ref 0–0.6)
INR PPP: 1.03
LYMPHOCYTES # BLD AUTO: 1.18 10*3/MM3 (ref 0.6–4.8)
LYMPHOCYTES NFR BLD AUTO: 19.6 % (ref 24–44)
Lab: NORMAL
MCH RBC QN AUTO: 27.8 PG (ref 27–31)
MCHC RBC AUTO-ENTMCNC: 30.8 G/DL (ref 32–36)
MCV RBC AUTO: 90.4 FL (ref 80–99)
MONOCYTES # BLD AUTO: 0.67 10*3/MM3 (ref 0–1)
MONOCYTES NFR BLD AUTO: 11.1 % (ref 0–12)
NEUTROPHILS # BLD AUTO: 3.78 10*3/MM3 (ref 1.5–8.3)
NEUTROPHILS NFR BLD AUTO: 62.8 % (ref 41–71)
ORGANISM ID: NORMAL
PLATELET # BLD AUTO: 303 10*3/MM3 (ref 150–450)
PMV BLD AUTO: 10 FL (ref 6–12)
POTASSIUM BLD-SCNC: 3.6 MMOL/L (ref 3.5–5.5)
PROTHROMBIN TIME: 11.2 SECONDS (ref 9.6–11.5)
RBC # BLD AUTO: 3.34 10*6/MM3 (ref 4.2–5.76)
S PNEUM AG SPEC QL LA: NEGATIVE
SODIUM BLD-SCNC: 137 MMOL/L (ref 132–146)
SPECIMEN SOURCE: NORMAL
WBC NRBC COR # BLD: 6.02 10*3/MM3 (ref 3.5–10.8)

## 2017-10-20 PROCEDURE — G0008 ADMIN INFLUENZA VIRUS VAC: HCPCS | Performed by: INTERNAL MEDICINE

## 2017-10-20 PROCEDURE — 85025 COMPLETE CBC W/AUTO DIFF WBC: CPT | Performed by: NURSE PRACTITIONER

## 2017-10-20 PROCEDURE — 82962 GLUCOSE BLOOD TEST: CPT

## 2017-10-20 PROCEDURE — 25010000002 INFLUENZA VAC SPLIT QUAD 0.5 ML SUSPENSION PREFILLED SYRINGE: Performed by: INTERNAL MEDICINE

## 2017-10-20 PROCEDURE — 97535 SELF CARE MNGMENT TRAINING: CPT

## 2017-10-20 PROCEDURE — 92612 ENDOSCOPY SWALLOW (FEES) VID: CPT

## 2017-10-20 PROCEDURE — 99239 HOSP IP/OBS DSCHRG MGMT >30: CPT | Performed by: NURSE PRACTITIONER

## 2017-10-20 PROCEDURE — 90674 CCIIV4 VAC NO PRSV 0.5 ML IM: CPT | Performed by: INTERNAL MEDICINE

## 2017-10-20 PROCEDURE — 92526 ORAL FUNCTION THERAPY: CPT

## 2017-10-20 PROCEDURE — 80048 BASIC METABOLIC PNL TOTAL CA: CPT | Performed by: NURSE PRACTITIONER

## 2017-10-20 PROCEDURE — 85610 PROTHROMBIN TIME: CPT | Performed by: NURSE PRACTITIONER

## 2017-10-20 PROCEDURE — 71020 HC CHEST PA AND LATERAL: CPT

## 2017-10-20 RX ORDER — AMOXICILLIN AND CLAVULANATE POTASSIUM 875; 125 MG/1; MG/1
1 TABLET, FILM COATED ORAL EVERY 12 HOURS SCHEDULED
Qty: 20 TABLET | Refills: 0
Start: 2017-10-20 | End: 2017-10-30

## 2017-10-20 RX ORDER — L.ACID,PARA/B.BIFIDUM/S.THERM 8B CELL
1 CAPSULE ORAL DAILY
Qty: 30 CAPSULE | Refills: 0
Start: 2017-10-21 | End: 2017-11-20

## 2017-10-20 RX ORDER — HYDROCODONE BITARTRATE AND ACETAMINOPHEN 7.5; 325 MG/1; MG/1
1 TABLET ORAL EVERY 4 HOURS PRN
Qty: 12 TABLET | Refills: 0 | Status: SHIPPED | OUTPATIENT
Start: 2017-10-20

## 2017-10-20 RX ORDER — FERROUS SULFATE 325(65) MG
325 TABLET ORAL 2 TIMES DAILY WITH MEALS
Qty: 30 TABLET | Refills: 0 | Status: SHIPPED | OUTPATIENT
Start: 2017-10-20

## 2017-10-20 RX ORDER — CALCIUM CARBONATE 200(500)MG
1 TABLET,CHEWABLE ORAL DAILY PRN
Start: 2017-10-20

## 2017-10-20 RX ADMIN — LEVOTHYROXINE SODIUM 75 MCG: 75 TABLET ORAL at 05:36

## 2017-10-20 RX ADMIN — OXYCODONE HYDROCHLORIDE AND ACETAMINOPHEN 500 MG: 500 TABLET ORAL at 08:30

## 2017-10-20 RX ADMIN — INFLUENZA VIRUS VACCINE 0.5 ML: 15; 15; 15; 15 SUSPENSION INTRAMUSCULAR at 16:13

## 2017-10-20 RX ADMIN — Medication 1 CAPSULE: at 08:30

## 2017-10-20 RX ADMIN — VALSARTAN 160 MG: 160 TABLET, FILM COATED ORAL at 08:30

## 2017-10-20 RX ADMIN — METOPROLOL TARTRATE 12.5 MG: 25 TABLET, FILM COATED ORAL at 08:29

## 2017-10-20 RX ADMIN — Medication 325 MG: at 08:30

## 2017-10-20 RX ADMIN — POTASSIUM CHLORIDE 40 MEQ: 1.5 POWDER, FOR SOLUTION ORAL at 08:28

## 2017-10-20 RX ADMIN — AMOXICILLIN AND CLAVULANATE POTASSIUM 1 TABLET: 875; 125 TABLET, FILM COATED ORAL at 08:29

## 2017-10-20 RX ADMIN — ACETAMINOPHEN 1000 MG: 500 TABLET ORAL at 08:29

## 2017-10-20 NOTE — PROGRESS NOTES
"Pharmacy Consult  -  Warfarin    Jonah Hannon is a  77 y.o. male   Height - 74\" (188 cm)  Weight - (!) 301 lb 3.2 oz (137 kg)    Consulting Provider: Ranjeet Varma  Indication: - a.fib.  Goal INR: -  2-3  Home Regimen:   - 10mg m-w-f   - 7.5mg s-tu-th-s      Warfarin Dosing During Admission:    Date  10/19 10/20          INR  1.04 1.03          Dose  7.5 mg 10 mg             No major drug interactions    Labs:      Results from last 7 days  Lab Units 10/20/17  0453 10/19/17  1536 10/18/17  0508 10/17/17  0554 10/16/17  0506 10/15/17  0545   INR  1.03 1.04  --   --   --   --    HEMOGLOBIN g/dL 9.3*  --  8.8* 8.7* 9.8* 8.6*   HEMATOCRIT % 30.2*  --  27.7* 28.4* 31.0* 28.5*   PLATELETS 10*3/mm3 303  --  244 234 253 241       Results from last 7 days  Lab Units 10/20/17  0453 10/19/17  0442 10/18/17  0508 10/17/17  0554   SODIUM mmol/L 137  --  133 135   POTASSIUM mmol/L 3.6 4.1 3.3* 3.9   CHLORIDE mmol/L 103  --  99 99   CO2 mmol/L 25.0  --  28.0 23.0   BUN mg/dL 12  --  14 15   CREATININE mg/dL 0.90  --  1.00 1.20   CALCIUM mg/dL 9.1  --  8.6* 8.7   GLUCOSE mg/dL 152*  --  138* 149*     Warfarin education provided verbally and in writing.  Understanding verified with patient teach back.    Diet Order   Procedures   • Diet Dysphagia; IV - Mechanical Soft No Mixed Consistencies; Nectar / Syrup Thick; No Straws   100% intake    Assessment/Plan:     Warfarin being resumed today after holding for procedure.  Will plan to resume home regimen as patient said this dose has worked well for him.  Of note, patient says he knows how his diet affects his INR and will \"take another half pill sometimes if he eats a lot of greens.\"    Pharmacy will follow daily INR and adjust plan as needed.     Thank you  Raegan Lima [facility]  10/20/2017  9:09 AM        "

## 2017-10-20 NOTE — PLAN OF CARE
Problem: Patient Care Overview (Adult)  Goal: Plan of Care Review  Outcome: Ongoing (interventions implemented as appropriate)    10/20/17 9402   Coping/Psychosocial Response Interventions   Plan Of Care Reviewed With patient   Patient Care Overview   Progress unable to show any progress toward functional goals   Outcome Evaluation   Outcome Summary/Follow up Plan FEES completed. Unfortunately, presented slightly worse than last instrumental swallow study. Suspect affected by fatigue. Pt presented w/ moderate pharyngeal dysphagia. At first, there was no aspiration of thin liquid via tsp/cup/straw. After couple other PO trials, pt began aspirating thin liquids and nectar-thick liquids and penetrating honey-thick liquids via large drinks to the level of the TVFs (trace amounts) during the swallow 2' delayed pharyngeal swallow initiation, reduced hyolaryngeal movement, and reduced closure @ the entrance to the airway. Aspiration was silent. Pt expelled aspirated material w/ cued cough. Trialed several compensatory strategies; however, majority did not successfully eliminate aspiration. Super-supraglottic swallow mariam'd to improve swallow safety when used correctly; however, pt demonstrated inconsistent accuracy. There was moderate vallecular pyriform sinus residue w/ all consistencies 2' reduced hyolaryngeal movement. Pharyngeal residue cleared w/ spontaneous swallows. Also, epiglottis was noted to curl anteriorly in such a way that appeared to partially occlude vallecular space. This appeared to @ times cause liquid bolus to spill to underside of epiglottis before hitting vallecular space. Suspect this may be contributing to penetration/aspiration, as well.      Unsure if this is pt's baseline or possibly acute-on-chronic dysphagia in setting of acute illness. Pt denies hx PNA. Discussed options w/ pt and safest PO diet (which he desired) would be: dysphagia level IV ([facility] soft, no mixed consistencies) diet,  honey-thick liquids; no straws, small drinks; ok for couple sips/chips b/t meals & after oral care only; monitor for fatigue; meds whole w/ puree.      Would rec cont'd dysphagia tx. Provided home mgt edu. Pt stated understanding. May benefit from OP MBS when showing improved strength in tx to determine if diet upgrade appropriate and may help w/ decision making.         Problem: Inpatient SLP  Goal: Dysphagia- Patient will improve swallowing skills to the level that a repeat evaluation is indicated  Outcome: Ongoing (interventions implemented as appropriate)    10/19/17 1146 10/20/17 1557   Repeat Evaluation Needed   Swallow Skills to Level that Repeat Evaluation Indicated- SLP, Date Established 10/19/17 --    Swallow Skills to Level that Repeat Evaluation Indicated- SLP, Time to Achieve by discharge --    Swallow Skills to Level that Repeat Evaluation Indicated- SLP, Date Goal Reviewed --  10/20/17   Swallow Skills to Level that Repeat Evaluation Indicated- SLP, Outcome --  goal ongoing

## 2017-10-20 NOTE — THERAPY TREATMENT NOTE
Acute Care - Speech Language Pathology   Swallow Progress Note [facility]     Patient Name: Jonah Hannon  : 1940  MRN: [id number]  Today's Date: 10/20/2017  Onset of Illness/Injury or Date of Surgery Date: 10/11/17            Admit Date: 10/11/2017    Visit Dx:      ICD-10-CM ICD-9-CM   1. Pharyngeal dysphagia R13.13 787.23   2. Colon cancer C18.9 153.9   3. Impaired mobility and ADLs Z74.09 799.89   4. Impaired functional mobility, balance, gait, and endurance Z74.09 V49.89     Patient Active Problem List   Diagnosis   • Iron deficiency anemia due to chronic blood loss   • Colon cancer   • Acquired hypothyroidism   • Type 2 diabetes mellitus with circulatory disorder   • Essential hypertension   • MARISOL (obstructive sleep apnea)   • Sacral wound   • S/P right hemicolectomy   • CAD in native artery-noncritical by [facility] 10/12/17   • Acute respiratory failure with hypoxia   • Pneumonia   • Atrial fibrillation   • Dysphagia   • Anemia   • Renal insufficiency             Adult Rehabilitation Note       10/20/17 1245 10/19/17 1551 10/19/17 1550    Rehab Assessment/Intervention    Discipline speech language pathologist  -VW occupational therapist  -AR physical therapist  -MM    Document Type therapy note (daily note)  -VW therapy note (daily note)  -AR therapy note (daily note)  -MM    Subjective Information no complaints;agree to therapy  -VW no complaints;agree to therapy  -AR no complaints;agree to therapy  -MM    Patient Effort, Rehab Treatment good  -VW good  -AR good  -MM    Symptoms Noted During/After Treatment  none  -AR none  -MM    Precautions/Limitations  fall precautions;brace on when up   hinged brace to LLE, prosthesis RLE, abdominal incision  -AR fall precautions;brace on when up  -MM    Precautions/Limitations, Vision   WFL  -MM    Precautions/Limitations, Hearing   WFL  -MM    Specific Treatment Considerations   brace and shoe on LLE and prosthesis on R  -MM    Patient Response to Treatment   good   -MM    Recorded by [VW] Nora Grant MA, CFY-SLP [AR] Rosario Jessica, OT [MM] Radha Hodgson, PT    Vital Signs    Pre Systolic BP Rehab   116  -MM    Pre Treatment Diastolic BP   72  -MM    Pretreatment Heart Rate (beats/min)   85  -MM    O2 Delivery Pre Treatment   room air  -MM    Pre Patient Position   Supine  -MM    Intra Patient Position   Standing  -MM    Post Patient Position   Sitting  -MM    Recorded by   [MM] Radha Hodgson, PT    Pain Assessment    Pain Assessment No/denies pain  -VW Sky-Baker FACES  -AR Sky-Hernandez FACES  -MM    Sky-Hernandez FACES Pain Rating  4  -AR 4  -MM    Pain Type  Chronic pain  -AR Chronic pain  -MM    Pain Location  Generalized  -AR Generalized  -MM    Pain Intervention(s)  Repositioned;Ambulation/increased activity  -AR Repositioned  -MM    Response to Interventions  tolerated  -AR     Recorded by [VW] Nora Grant MA, CFY-SLP [AR] Rosario Jessica, OT [MM] Radha Hodgson, PT    Cognitive Assessment/Intervention    Current Cognitive/Communication Assessment  functional  -AR functional  -MM    Orientation Status  oriented x 4  -AR oriented x 4  -MM    Follows Commands/Answers Questions  100% of the time;able to follow multi-step instructions  -% of the time  -MM    Personal Safety  WNL/WFL  -AR WNL/WFL  -MM    Personal Safety Interventions  fall prevention program maintained  -AR fall prevention program maintained;gait belt;nonskid shoes/slippers when out of bed  -MM    Recorded by  [AR] Rosario Jessica, OT [MM] Radha Hodgson, PT    Improve timing of pharyngeal response    To improve timing of pharyngeal response, patient will: Swallow in timely way using three second prep;Swallow in timely way using super-supraglottic swallow;Swallow in timely way using Mendelsohn maneuver;80%;with consistent cues  -VW      Status: Improve timing of pharyngeal response Progressing as expected  -VW      Timing of Pharyngeal Response Progress 70%;with  consistent cues;continue to adress  -VW      Recorded by [VW] Nora Grant MA, CFY-SLP      Improve closure at entrance to airway    To improve closure at entrance to airway, patient will: Complete super-supraglottic swallow;80%;with consistent cues  -VW      Status: Improve closure at entrance to airway Progressing as expected  -VW      Closure at Entrance to Airway Progress continue to adress  -VW      Recorded by [VW] Nora Grant MA, CFY-SLP      Improve tongue base & pharyngeal wall squeeze    To improve tongue base & pharyngeal wall squeeze, patient will: Complete effortful swallow;Complete tongue hold swallow;80%;with consistent cues  -VW      Status: Improve tongue base & pharyngeal wall squeeze Progressing as expected  -VW      Tongue Base/Pharyngeal Wall Squeeze Progress 70%;with consistent cues;continue to adress  -VW      Recorded by [VW] Nora Grant MA, CFY-SLP      ROM (Range of Motion)    General ROM   no range of motion deficits identified  -MM    Recorded by   [MM] Radha Hodgson PT    MMT (Manual Muscle Testing)    General MMT Assessment   no strength deficits identified  -MM    Recorded by   [MM] Radha Hodgson PT    Bed Mobility, Assessment/Treatment    Bed Mobility, Assistive Device  bed rails;head of bed elevated  -AR bed rails;head of bed elevated  -MM    Bed Mobility, Scoot/Bridge, [place]  conditional independence  -AR     Bed Mob, Supine to Sit, [place]  conditional independence  -AR conditional independence  -MM    Bed Mob, Sit to Supine, [place]  conditional independence  -AR     Recorded by  [AR] Rosario Jessica OT [MM] Radha Hodgson PT    Transfer Assessment/Treatment    Transfers, Sit-Stand [place]  contact guard assist;2 person assist required  -AR contact guard assist;2 person assist required  -MM    Transfers, Stand-Sit [place]  contact guard assist;2 person assist required  -AR contact guard assist  -MM    Transfers,  Sit-Stand-Sit, Assist Device  elevated surface;standard walker  -AR     Transfer, Impairments  strength decreased;impaired balance  -AR     Transfer, Comment  Good safety awareness  -AR bed raised so hips were higher than knees  -MM    Recorded by  [AR] oRsario Jessica OT [MM] Radha Hodgson, PT    Gait Assessment/Treatment    Gait, [place] Level   contact guard assist;2 person assist required  -MM    Gait, Assistive Device   standard walker  -MM    Gait, Distance (Feet)   24  -MM    Gait, Gait Deviations   forward flexed posture;swing-to-stance ratio decreased;toe-to-floor clearance decreased  -MM    Gait, Safety Issues   balance decreased during turns  -MM    Gait, Comment   prosthesis and brace donned while pt sitting EOB  -MM    Recorded by   [MM] Radha Hodgson, PT    Functional Mobility    Functional Mobility- Ind. Level  contact guard assist;2 person assist required  -AR     Functional Mobility- Device  standard walker  -AR     Recorded by  [AR] Rosario Jessica OT     Lower Body Dressing Assessment/Training    LB Dressing Assess/Train, Clothing Type  doffing:;donning:   L brace and R prosthesis  -AR     LB Dressing Assess/Train, Position  edge of bed  -AR     LB Dressing Assess/Train, [place]  moderate assist (50% patient effort)  -AR     LB Dressing Assess/Train, Comment  pt reports he dons L brace supine at home and requests assist this date for EC  -AR     Recorded by  [AR] Rosario Jessica OT     Balance Skills Training    Sitting-Level of Assistance  Independent  -AR Independent  -MM    Sitting-Balance Support  Feet supported  -AR Feet unsupported  -MM    Sitting-Balance Activities   Trunk control activities  -MM    Sitting # of Minutes   8  -MM    Standing-Level of Assistance  Contact guard;x2  -AR Contact guard;x2  -MM    Static Standing Balance Support  assistive device  -AR assistive device  -MM    Recorded by  [AR] Rosario Jessica OT [MM] Radha Hodgson, PT     Therapy Exercises    Bilateral Upper Extremity  --   blue theraband   -AR     BUE Resistance  theraband  -AR     Recorded by  [AR] Rosario Jessica OT     Positioning and Restraints    Pre-Treatment Position  in bed  -AR in bed  -MM    Post Treatment Position  bed  -AR bed  -MM    In Bed  supine;call light within reach;encouraged to call for assist;exit alarm on;notified nsg  -AR sitting EOB;call light within reach;encouraged to call for assist;notified nsg  -MM    Recorded by  [AR] Rosario Jessica OT [MM] Radha Hodgson, PT      10/19/17 1100 10/18/17 0839 10/18/17 0829    Rehab Assessment/Intervention    Discipline speech language pathologist  -KELLY MENDEZ,RD2 occupational therapist  -AR physical therapy assistant  -AS    Document Type therapy note (daily note)  -KELLY MENDEZRD2 therapy note (daily note)  -AR therapy note (daily note)  -AS    Subjective Information agree to therapy;no complaints  -KELLY MENDEZ,RD2 agree to therapy;complains of;weakness;pain  -AR agree to therapy;complains of;weakness  -AS    Patient Effort, Rehab Treatment good  -KELLY MENDEZ,RD2 good  -AR good  -AS    Symptoms Noted During/After Treatment  fatigue  -AR fatigue  -AS    Precautions/Limitations  fall precautions;other (see comments)   R BKA with prosthesis, LLE hinged brace w/shoe  -AR     Equipment Issued to Patient  --   pt has reacher, declined other AE items  -AR     Recorded by [VANESSA,KELLY,RD2] Emily Sullivan MS CCC-SLP (r) Olivia Mon, Speech Therapy Student (t) Emily Sullivan MS CCC-SLP (c) [AR] Rosario Jessica, OT [AS] Zoe Yanes, FLYNN    Pain Assessment    Pain Assessment No/denies pain  -VANESSA,RK,RD2 0-10  -AR No/denies pain  -AS    Pain Score  7  -AR 0  -AS    Post Pain Score  7  -AR 0  -AS    Pain Type  Chronic pain  -AR     Pain Location  Generalized  -AR     Pain Intervention(s)  Repositioned;Ambulation/increased activity  -AR     Response to Interventions  tolerated  -AR     Recorded by [VANESSA,RK,RD2] Emily Sullivan MS  CCC-SLP (r) Olivia Mon, Speech Therapy Student (t) Emily Sullivan MS CCC-SLP (c) [AR] Rosario Jessica, OT [AS] Zoe Yanes PTA    Cognitive Assessment/Intervention    Current Cognitive/Communication Assessment  functional  -AR functional  -AS    Orientation Status  oriented x 4  -AR oriented x 4  -AS    Follows Commands/Answers Questions  100% of the time;able to follow multi-step instructions  -% of the time;able to follow single-step instructions  -AS    Personal Safety  WNL/WFL  -AR WNL/WFL  -AS    Personal Safety Interventions  fall prevention program maintained  -AR fall prevention program maintained;gait belt;nonskid shoes/slippers when out of bed;other (see comments)   R prosthesis, L LE brace/shoe  -AS    Recorded by  [AR] Rosario Jessica OT [AS] Zoe Yanes PTA    Improve timing of pharyngeal response    To improve timing of pharyngeal response, patient will: Swallow in timely way using three second prep;Swallow in timely way using super-supraglottic swallow;Swallow in timely way using Mendelsohn maneuver;80%;with consistent cues  -RD,RK,RD2      Status: Improve timing of pharyngeal response Progressing as expected  -RD,RK,RD2      Timing of Pharyngeal Response Progress 60%;with consistent cues;continue to adress  -RD,RK,RD2      Recorded by [RD,RK,RD2] Emily Sullivan MS CCC-SLP (r) Olivia Mon, Speech Therapy Student (t) Emily Sullivan MS CCC-SLP (c)      Improve closure at entrance to airway    To improve closure at entrance to airway, patient will: Complete super-supraglottic swallow;80%;with consistent cues  -RD,RK,RD2      Status: Improve closure at entrance to airway Progressing as expected  -RD,RK,RD2      Closure at Entrance to Airway Progress 60%;with consistent cues;continue to adress  -RD,RK,RD2      Recorded by [RD,RK,RD2] Emily Sullivan MS CCC-SLP (r) Olivia Mon, Speech Therapy Student (t) Emily Sullivan MS CCC-SLP (c)      Improve  tongue base & pharyngeal wall squeeze    To improve tongue base & pharyngeal wall squeeze, patient will: Complete effortful swallow;Complete tongue hold swallow;80%;with consistent cues  -RD,RK,RD2      Status: Improve tongue base & pharyngeal wall squeeze Progressing as expected  -RD,RK,RD2      Tongue Base/Pharyngeal Wall Squeeze Progress 60%;with consistent cues;continue to adress  -RD,RK,RD2      Recorded by [RD,RK,RD2] Emily Sullivan, MS CCC-SLP (r) Olivia Mon, Speech Therapy Student (t) Emily Sullivan, MS CCC-SLP (c)      Bed Mobility, Assessment/Treatment    Bed Mobility, Assistive Device  bed rails;head of bed elevated  -AR     Bed Mobility, Scoot/Bridge, [place]  supervision required  -AR     Bed Mob, Supine to Sit, [place]  supervision required  -AR supervision required  -AS    Bed Mob, Sit to Supine, [place]  supervision required  -AR supervision required  -AS    Bed Mobility, Impairments  strength decreased  -AR strength decreased  -AS    Bed Mobility, Comment   good technique  -AS    Recorded by  [AR] Rosario Jessica, OT [AS] Zoe Yanes, PTA    Transfer Assessment/Treatment    Transfers, Sit-Stand [place]  verbal cues required;minimum assist (75% patient effort);2 person assist required  -AR verbal cues required;minimum assist (75% patient effort);2 person assist required  -AS    Transfers, Stand-Sit [place]  verbal cues required;minimum assist (75% patient effort);2 person assist required  -AR verbal cues required;minimum assist (75% patient effort);2 person assist required  -AS    Transfers, Sit-Stand-Sit, Assist Device  rolling walker  -AR rolling walker  -AS    Transfer, Safety Issues   weight-shifting ability decreased  -AS    Transfer, Impairments  strength decreased;impaired balance  -AR strength decreased;impaired balance  -AS    Transfer, Comment  cues for hand palcement and bed approach  -AR verbal cues to keep walker close and for posture.  Limited by weakness.  -AS    Recorded by  [AR] Rosario Jessica OT [AS] Zoe Yanes PTA    Gait Assessment/Treatment    Gait, [place] Level   verbal cues required;minimum assist (75% patient effort);2 person assist required  -AS    Gait, Assistive Device   rolling walker  -AS    Gait, Distance (Feet)   32  -AS    Gait, Gait Deviations   huang decreased;forward flexed posture;step length decreased  -AS    Gait, Safety Issues   step length decreased;weight-shifting ability decreased  -AS    Gait, Impairments   strength decreased;impaired balance  -AS    Gait, Comment   verbal cues to stay inside walker and for posture.  -AS    Recorded by   [AS] Zoe Yanes PTA    Upper Body Dressing Assessment/Training    UB Dressing Assess/Train, Clothing Type  doffing:;donning:;hospital gown  -AR     UB Dressing Assess/Train, Position  sitting  -AR     UB Dressing Assess/Train, [place]  minimum assist (75% patient effort);verbal cues required  -AR     Recorded by  [AR] Rosario Jessica OT     Lower Body Dressing Assessment/Training    LB Dressing Assess/Train, Clothing Type  donning:;doffing:   RLE prosthesis, LLE hinged brace  -AR     LB Dressing Assess/Train, Assist Device  --   pt declined LH shoe horn   -AR     LB Dressing Assess/Train, Position  edge of bed  -AR     LB Dressing Assess/Train, [place]  maximum assist (25% patient effort);minimum assist (75% patient effort)   min assist RLE prosthesis, LLE hinged brace  -AR     LB Dressing Assess/Train, Impairments  impaired balance;strength decreased;motor control impaired  -AR     LB Dressing Assess/Train, Comment  Pt declined AE, he required min assist for prosthesis, extensive assist to don LLE hinged brace and able to tell OT how to don/doff  -AR     Recorded by  [AR] Rosario Jessica OT     Positioning and Restraints    Pre-Treatment Position  in bed  -AR in bed  -AS    Post Treatment Position  bed  -AR bed  -AS    In Bed   supine;call light within reach;encouraged to call for assist;exit alarm on  -AR supine;call light within reach;encouraged to call for assist;exit alarm on  -AS    Recorded by  [AR] Rosario Jessica, OT [AS] Zoe Yanes, PTA      User Key  (r) = Recorded By, (t) = Taken By, (c) = Cosigned By    Initials Name Effective Dates    MM Radha Hodgson, PT 06/19/15 -     AR Rosario Jessica, OT 06/22/15 -     AS Zoe Yanes, PTA 06/22/15 -     RD Emily Sullivan MS CCC-SLP 09/27/17 -     VW Nora Grant MA, NATA-SLP 07/13/17 -     KELLY Mon, Speech Therapy Student 08/24/17 -                   IP SLP Goals       10/20/17 1329 10/19/17 1146       Repeat Evaluation Needed    Swallow Skills to Level that Repeat Evaluation Indicated- SLP, Date Established  10/19/17  -RD (r) RK (t) RD (c)     Swallow Skills to Level that Repeat Evaluation Indicated- SLP, Time to Achieve  by discharge  -RD (r) RK (t) RD (c)     Swallow Skills to Level that Repeat Evaluation Indicated- SLP, Date Goal Reviewed 10/20/17  -VW 10/19/17  -RD (r) RK (t) RD (c)     Swallow Skills to Level that Repeat Evaluation Indicated- SLP, Outcome goal ongoing  -VW goal ongoing  -RD (r) RK (t) RD (c)       User Key  (r) = Recorded By, (t) = Taken By, (c) = Cosigned By    Initials Name Provider Type    VANESSA Sullivan, MS CRISTOBAL-SLP Speech and Language Pathologist    SADE Grant MA, NATA-SLP Speech and Language Pathologist    KELLY Mon, Speech Therapy Student Speech Therapy Student          EDUCATION  The patient has been educated in the following areas:   Dysphagia (Swallowing Impairment) Oral Care/Hydration Modified Diet Instruction.    SLP Recommendation and Plan    Plan of Care Review  Plan Of Care Reviewed With: patient  Progress: progress toward functional goals as expected  Outcome Summary/Follow up Plan: Pt seen this pm for dysphagia tx. Pt participated well in exercises. Reported possibly discharging  home soon and expressed concern about going home on NTL. Trialed NTL w/ no s/sxs and trialed thins via cup w/ delayed cough x1. Recommend repeat FEES, cont NTL until complete.           Time Calculation:         Time Calculation- SLP       10/20/17 1332          Time Calculation- SLP    SLP Start Time 1245  -VW      SLP Received On 10/20/17  -        User Key  (r) = Recorded By, (t) = Taken By, (c) = Cosigned By    Initials Name Provider Type     Nora Grant MA, CFY-SLP Speech and Language Pathologist          Therapy Charges for Today     Code Description Service Date Service Provider Modifiers Qty    62124750375 HC ST TREATMENT SWALLOW 2 10/20/2017 Nora Grant MA, CFY-SLP GN 1                 Nora Grant MA, CFY-SLP  10/20/2017

## 2017-10-20 NOTE — PLAN OF CARE
Problem: Patient Care Overview (Adult)  Goal: Plan of Care Review  Outcome: Ongoing (interventions implemented as appropriate)    10/20/17 1329   Coping/Psychosocial Response Interventions   Plan Of Care Reviewed With patient   Patient Care Overview   Progress progress toward functional goals as expected   Outcome Evaluation   Outcome Summary/Follow up Plan Pt seen this pm for dysphagia tx. Pt participated well in exercises. Reported possibly discharging home soon and expressed concern about going home on NTL. Trialed NTL w/ no s/sxs and trialed thins via cup w/ delayed cough x1. Recommend repeat FEES, cont NTL until complete.         Problem: Inpatient SLP  Goal: Dysphagia- Patient will improve swallowing skills to the level that a repeat evaluation is indicated  Outcome: Ongoing (interventions implemented as appropriate)    10/19/17 1146 10/20/17 1329   Repeat Evaluation Needed   Swallow Skills to Level that Repeat Evaluation Indicated- SLP, Date Established 10/19/17 --    Swallow Skills to Level that Repeat Evaluation Indicated- SLP, Time to Achieve by discharge --    Swallow Skills to Level that Repeat Evaluation Indicated- SLP, Date Goal Reviewed --  10/20/17   Swallow Skills to Level that Repeat Evaluation Indicated- SLP, Outcome --  goal ongoing

## 2017-10-20 NOTE — PROGRESS NOTES
Diarrhea continues and C. difficile negative so it is safe to add Imodium or Lomotil.  We'll start with Imodium.  Morning discharge pending.

## 2017-10-20 NOTE — PLAN OF CARE
Problem: Patient Care Overview (Adult)  Goal: Plan of Care Review  Outcome: Ongoing (interventions implemented as appropriate)    10/20/17 0433   Coping/Psychosocial Response Interventions   Plan Of Care Reviewed With patient   Patient Care Overview   Progress improving   Outcome Evaluation   Outcome Summary/Follow up Plan VSS. pt rested well tonight. no complaints. possible D/C home today.

## 2017-10-20 NOTE — PROGRESS NOTES
"[facility] Progress Note    Date of Admission: 10/11/2017      Antibiotics:  Augmentin     CC: No chief complaint on file.      S: No f/c/s. No n/v/d. Stooling has improved. Patient with modified barium evaluation yesterday with level IV dysphagia. Patient confused about these results and is hating nectar thick liquids. Doesn't want feeding tube placement at this time.     O:  /86  Pulse 90  Temp 98.2 °F (36.8 °C) (Oral)   Resp 18  Ht 74\" (188 cm)  Wt (!) 301 lb 3.2 oz (137 kg)  SpO2 94%  BMI 38.67 kg/m2  Temp (24hrs), Av.3 °F (36.8 °C), Min:98.2 °F (36.8 °C), Max:98.3 °F (36.8 °C)      PE:   GENERAL: Awake and alert, in no acute distress. Good historian.  Morbidly obese sitting in the bed.  No apparent distress. Poor hearing  HEENT: Normocephalic, atraumatic.  PERRL. EOMI. No conjunctival injection. No icterus. Oropharynx clear without evidence of thrush or exudate. No evidence of peridontal disease.      NECK: Supple without nuchal rigidity  LYMPH: No cervical, axillary or inguinal lymphadenopathy. No neck masses  HEART: RRR;  systolic ejection murmur grade 2/6 present.  No rubs or gallops.  LUNGS:  Diminished breath sounds throughout secondary to body habitus but otherwise Clear to auscultation bilaterally without wheezing, rales, rhonchi. Normal respiratory effort. no use of supplemental oxygen  ABDOMEN:  Morbidly obese with surgical abdominal incision with staples intact and no evidence of dehiscence.  Normal bowel sounds.  No rebound or guarding.   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  Has prosthesis for this to assist with mobility.  Patient has LLE Charcot arthropathy with severely deformed left foot   SKIN:   Sacral region with a nickel-sized open wound healthy granulation tissue present without wound drainage.  Wet-to-dry packing in place.  NEURO: " Oriented to PPT. No focal deficits.   PSYCHIATRIC: Normal insight and judgement. Cooperative with PE    Laboratory Data      Results from last 7 days  Lab Units 10/20/17  0453 10/18/17  0508 10/17/17  0554   WBC 10*3/mm3 6.02 7.20 8.88   HEMOGLOBIN g/dL 9.3* 8.8* 8.7*   HEMATOCRIT % 30.2* 27.7* 28.4*   PLATELETS 10*3/mm3 303 244 234       Results from last 7 days  Lab Units 10/20/17  0453   SODIUM mmol/L 137   POTASSIUM mmol/L 3.6   CHLORIDE mmol/L 103   CO2 mmol/L 25.0   BUN mg/dL 12   CREATININE mg/dL 0.90   GLUCOSE mg/dL 152*   CALCIUM mg/dL 9.1                   Estimated Creatinine Clearance: 99.5 mL/min (by C-G formula based on Cr of 0.9).      Microbiology:  CDT PCR: negative  Blood culture CNS 1/2 sets with repeat culture negative  Wound culture 10/11: Proteus, Enterococcus, Strep sp    Radiology:  Imaging Results (last 24 hours)     Procedure Component Value Units Date/Time    XR Chest 1 View [[id number]] Collected:  10/19/17 0845     Updated:  10/19/17 1058    Narrative:       EXAMINATION: XR CHEST 1 VW-10/19/2017:      INDICATION: Pneumonia; R13.13-Dysphagia, pharyngeal phase;  C18.9-Malignant neoplasm of colon, unspecified; Z74.09-Other reduced  mobility; Z74.09-Other reduced mobility.      COMPARISON: 10/17/2017.     FINDINGS: The heart is within the limits of normal. There is airspace  disease in the right lower lobe with minimal disease of the left lung  base laterally. There is no pneumothorax and there has been no  significant change.           Impression:       There has been no change since the previous examination of  10/17/2017.     D:  10/19/2017  E:  10/19/2017     This report was finalized on 10/19/2017 10:56 AM by Dr. Wilbert Isabel MD.                PROBLEM LIST:   Right lower lobe pneumonia c/w aspiration pneumonia  Sacral wound With polymicrobial infection including strep species, enterococcus, Proteus mirabilis  Colon cancer and s/p hemicolectomyWith anastomosis procedure performed   Stage  IV Dysphagia- on nectar thick liquids  Hypokalemia  Anemia  Type 2 diabetes mellitus, hypertension, Charcot arthropathy     ASSESSMENT:  Patient is a 77-year-old male transferred to [facility] secondary to new diagnosis of colon cancer with colorectal surgical consultation and required hemicolectomy.  Patient had postoperative right lower lobe pneumonia formation was initiated on antibiotics with vancomycin and Zosyn with improvement in his white blood cell count respiratory effort.  He has stage IV dysphasia and is scheduled for repeat swallow evaluation today.  He is currently  Wishing for no feeding interventions but wants to see how his swallow study goes today to assist with decision making.  Patient also has a chronic sacral decubitus ulcer with polymicrobial infection on culture this admission.    According to chart review, patient is wanting to return home with family friend assistance and is refusing rehabilitation placement at this time.  Recommend continue oral antibiotic therapy using Augmentin 875/125 mg by mouth twice a day ×10 days.  Augmentin will cover intra-abdominal source of infection/aspiration pneumonia/polymicrobial sacral wound infection.  He also has a risk of development of C. difficile colitis and will leave the prescription for daily probiotic for 30 days.  Patient has follow-up appointment with us on Monday, October 30 for wound review and repeat chest x-ray.       PLAN:  Continue Augmentin 875/125 by mouth twice a day ×10 days cover wound and possible aspiration  Daily probiotic ×30 days  Wound care per home health at the time of discharge  Follow-up appointment with Dr. Ward on 10/30/2017 for repeat wound evaluation and chest x-ray     Um:  Discharge planning on oral antibiotics, ok to discharge on oral antibiotics, we will see again on Monday if still in-house    Dr. Butch Ward saw the patient, performed the physical exam, reviewed the laboratory data and guided with  the formulation of the above problem list, assessment and treatment plan.     Butch Ward MD  10/20/2017

## 2017-10-20 NOTE — MBS/VFSS/FEES
Acute Care - Speech Language Pathology   Swallow Re-Evaluation [facility]   Fiberoptic Endoscopic Evaluation of Swallowing (FEES)     Patient Name: Jonah Hannon  : 1940  MRN: [id number]  Today's Date: 10/20/2017  Onset of Illness/Injury or Date of Surgery Date: 10/11/17            Admit Date: 10/11/2017    Visit Dx:     ICD-10-CM ICD-9-CM   1. Pharyngeal dysphagia R13.13 787.23   2. Colon cancer C18.9 153.9   3. Impaired mobility and ADLs Z74.09 799.89   4. Impaired functional mobility, balance, gait, and endurance Z74.09 V49.89   5. Type 2 diabetes mellitus with other circulatory complication, unspecified long term insulin use status E11.59 250.70     Patient Active Problem List   Diagnosis   • Iron deficiency anemia due to chronic blood loss   • Colon cancer   • Acquired hypothyroidism   • Type 2 diabetes mellitus with circulatory disorder   • Essential hypertension   • MARISOL (obstructive sleep apnea)   • Sacral wound   • S/P right hemicolectomy   • CAD in native artery-noncritical by [facility] 10/12/17   • Pneumonia   • Atrial fibrillation   • Dysphagia   • Anemia   • Renal insufficiency     Past Medical History:   Diagnosis Date   • CHF (congestive heart failure)    • CMT (Charcot-Collette-Tooth disease)    • Diabetes    • Hypertension      Past Surgical History:   Procedure Laterality Date   • BELOW KNEE LEG AMPUTATION     • CARDIAC CATHETERIZATION     • CARDIAC CATHETERIZATION N/A 10/12/2017    Procedure: Left Heart Cath;  Surgeon: Lloyd Gooden MD;  Location: [facility] CATH INVASIVE LOCATION;  Service:    • COLON RESECTION N/A 10/14/2017    Procedure: GAUDENCIO COLECTOMY AND UMBILICAL HERNIA REPAIR;  Surgeon: Keith Worthy MD;  Location: [facility] OR;  Service:    • CYST REMOVAL      pilonodal cyst on back removed.           SWALLOW EVALUATION (last 72 hours)      Swallow Evaluation       10/20/17 1330                Rehab Evaluation    Document Type re-evaluation;other (see comments)   FEES  -AC    Subjective  Information no complaints;agree to therapy  -    General Information    Patient Profile Review yes  -    Onset of Illness/Injury 10/11/17  -    Subjective Patient Observations Pt alert, cooperative.   -    Pertinent History Of Current Problem Colon cancer s/p R hemicolectomy, DM2, PNA   -    Current Diet Limitations nectar thick liquids;mechanical soft  -    Prior Level of Function- Swallowing no diet consistency restrictions;other (comment)   pt denies hx dysphagia/pneumonia  -    Plans/Goals Discussed With patient;agreed upon  -    Barriers to Rehab none identified  -    Clinical Impression    Patient's Goals For Discharge return to regular diet;return home  -    SLP Swallowing Diagnosis moderate dysphagia;pharyngeal dysfunction  -    Rehab Potential/Prognosis, Swallowing good, to achieve stated therapy goals  -    Criteria for Skilled Therapeutic Interventions Met skilled criteria for dysphagia intervention met  -    Therapy Frequency 5 times/wk  -    SLP Diet Recommendation IV - mechanical soft, no mixed consistencies;honey-thick liquids;other (see comments)   may have couple sips/chips only b/t meals & after oral care  -    Recommended Diagnostics other (see comments)   OP MBS as he makes improvements in tx after d/c  -    Recommended Feeding/Eating Techniques small sips/bites;no straws;maintain upright posture during/after eating for 30 mins  -    SLP Rec. for Method of Medication Administration meds whole in pudding/applesauce  -    Anticipated Discharge Disposition home with home health  -    Pain Assessment    Pain Assessment No/denies pain  -    Fiberoptic Endoscopic Swallowing Exam    Risks/Benefits Reviewed risks/benefits explained;patient;agreed to eval  -    Nasal Entry, Topical Anesthetic right:;nostril entered using no topical anesthetic  -    Anatomy and Physiology    Velopharyngeal WFL  -    Base of Tongue symmetrical  -    Epiglottis WFL;other  (comment)   curls anteriorly in way that partially occludes vallec space  -AC    Laryngeal Function Breathing symmetrical  -AC    Laryngeal Function Phonation symmetrical  -AC    Laryngeal Function Breath Hold TVF contact  -AC    Secretions 0- Normal, no visible secretions  -AC    Secretion Description thin;clear  -AC    Ice Chips DNA  -AC    Spontaneous Swallow frequency adequate  -AC    Sensory senses scope  -AC    Oral-Phary Transit WFL  -AC    FEES    Mode of Presentation thin:;nectar:;honey:;pudding:;mixed:;cohesive solid:;fed by clinician;self fed;spoon;cup;straw  -AC    Pharyngeal Phase Impairment bolus to pyriforms before initiation of response;reduced laryngeal elevation;reduced anterior hyolaryngeal excursion;reduced closure vestibule;aspiration during  -AC    Post Swallow Residue all consistencies:;post swallow residue present;residue present pyriform sinuses;residue present in valleculae;clears residue valleculae with cue;clears residue pyriforms with cue;other (see comments)   clears spontaneously  -AC    Rosenbek's Scale thin:;nectar:;mixed:;8-->Level 8;honey:;5-->Level 5;pudding:;cohesive solid:;1-->Level 1  -AC    Response to Aspiration absent response, silent aspiration;productive volitional cough following clinician cue  -AC    Attempted Compensatory Maneuvers chin down position;effortful (hard) swallow;head turn left;multiple swallows;super-supraglottic swallow;bolus size;bolus presentation style  -AC    Pharyngeal Phase Results impaired pharyngeal phase of swallowing  -AC    FEES Summary Unfortunately, presented slightly worse than last instrumental swallow study. Suspect affected by fatigue. Pt presented w/ moderate pharyngeal dysphagia. At first, there was no aspiration of thin liquid via tsp/cup/straw. After couple other PO trials, pt began aspirating thin liquids and nectar-thick liquids and penetrating honey-thick liquids via large drinks to the level of the TVFs (trace amounts) during the  swallow 2' delayed pharyngeal swallow initiation, reduced hyolaryngeal movement, and reduced closure @ the entrance to the airway. Aspiration was silent. Pt expelled aspirated material w/ cued cough. Trialed several compensatory strategies; however, majority did not successfully eliminate aspiration. Super-supraglottic swallow mariam'd to improve swallow safety when used correctly; however, pt demonstrated inconsistent accuracy. There was moderate vallecular pyriform sinus residue w/ all consistencies 2' reduced hyolaryngeal movement. Pharyngeal residue cleared w/ spontaneous swallows. Also, epiglottis was noted to curl anteriorly in such a way that appeared to partially occlude vallecular space. This appeared to @ times cause liquid bolus to spill to underside of epiglottis before hitting vallecular space. Suspect this may be contributing to penetration/aspiration, as well. Unsure if this is pt's baseline or possibly acute-on-chronic dysphagia in setting of acute illness. Pt denies hx PNA. Discussed options w/ pt and safest PO diet (which he desired) would be: dysphagia level IV (mech soft, no mixed consistencies) diet, honey-thick liquids; no straws, small drinks; ok for couple sips/chips b/t meals & after oral care only; monitor for fatigue; meds whole w/ puree. Would rec cont'd dysphagia tx. Provided home mgt edu. Pt stated understanding. May benefit from OP MBS when showing improved strength in tx to determine if diet upgrade appropriate and may help w/ decision making.  -AC    FEES Swallow Recommendations    Oral Care oral care with toothbrush and dentifrice BID and PRN  -AC    Dysphagia Treatment Objectives and Progress    Dysphagia Treatment Objectives Improve laryngeal elevation;Improve hyolaryngeal excursion;Other 1  -AC    Improve timing of pharyngeal response    To improve timing of pharyngeal response, patient will: Swallow in timely way using three second prep;Swallow in timely way using  super-supraglottic swallow;Swallow in timely way using Mendelsohn maneuver;80%;with consistent cues  -AC    Status: Improve timing of pharyngeal response Progressing as expected  -AC    Timing of Pharyngeal Response Progress continue to adress  -AC    Improve closure at entrance to airway    To improve closure at entrance to airway, patient will: Complete super-supraglottic swallow;80%;with consistent cues  -AC    Status: Improve closure at entrance to airway Progressing as expected  -AC    Closure at Entrance to Airway Progress continue to adress  -AC    Improve laryngeal elevation    To improve laryngeal elevation, patient will: Complete Mendelsohn maneuver;Complete pitch-glide;80%;with inconsistent cues  -AC    Status: Improve laryngeal elevation New  -AC    Laryngeal Elevation Progress continue to adress  -AC    Improve hyolaryngeal excursion    To improve hyolaryngeal excursion, patient will: Complete head lift sustained (comment number of seconds);Complete head lift repetitive (comment number of lifts);Complete chin tuck against resistance (comment number of repetitions);with inconsistent cues   30 sec, 5 reps  -AC    Status: Improve hyolaryngeal excursion New  -AC    Hyolaryngeal Excursion Progress continue to adress  -AC    Dysphagia Other 1    Dysphagia Other 1 Objective Patient will tolerate recommended diet w/o overt clinical s/sxs aspiration or difficulty w/o cues.  -AC    Status: Dysphagia Other 1 New  -AC    Dysphagia Other 1 Progress continue to address  -AC      User Key  (r) = Recorded By, (t) = Taken By, (c) = Cosigned By    Initials Name Effective Dates    AC Cyndee Bustillos MS CCC-SLP 07/27/17 -         EDUCATION  The patient has been educated in the following areas:   Dysphagia (Swallowing Impairment) Oral Care/Hydration Modified Diet Instruction.    SLP Recommendation and Plan  SLP Swallowing Diagnosis: moderate dysphagia, pharyngeal dysfunction  SLP Diet Recommendation: IV - mechanical soft, no  mixed consistencies, honey-thick liquids, other (see comments) (may have couple sips/chips only b/t meals & after oral care)  Recommended Feeding/Eating Techniques: small sips/bites, no straws, maintain upright posture during/after eating for 30 mins  SLP Rec. for Method of Medication Administration: meds whole in pudding/applesauce  Recommended Diagnostics: other (see comments) (OP MBS as he makes improvements in tx after d/c)  Criteria for Skilled Therapeutic Interventions Met: skilled criteria for dysphagia intervention met  Anticipated Discharge Disposition: home with home health  Rehab Potential/Prognosis, Swallowing: good, to achieve stated therapy goals  Therapy Frequency: 5 times/wk    Plan of Care Review  Plan Of Care Reviewed With: patient  Progress: unable to show any progress toward functional goals  Outcome Summary/Follow up Plan: FEES completed. Unfortunately, presented slightly worse than last instrumental swallow study. Suspect affected by fatigue. Pt presented w/ moderate pharyngeal dysphagia. At first, there was no aspiration of thin liquid via tsp/cup/straw. After couple other PO trials, pt began aspirating thin liquids and nectar-thick liquids and penetrating honey-thick liquids via large drinks to the level of the TVFs (trace amounts) during the swallow 2' delayed pharyngeal swallow initiation, reduced hyolaryngeal movement, and reduced closure @ the entrance to the airway. Aspiration was silent. Pt expelled aspirated material w/ cued cough. Trialed several compensatory strategies; however, majority did not successfully eliminate aspiration. Super-supraglottic swallow mariam'd to improve swallow safety when used correctly; however, pt demonstrated inconsistent accuracy. There was moderate vallecular pyriform sinus residue w/ all consistencies 2' reduced hyolaryngeal movement. Pharyngeal residue cleared w/ spontaneous swallows. Also, epiglottis was noted to curl anteriorly in such a way that  appeared to partially occlude vallecular space. This appeared to @ times cause liquid bolus to spill to underside of epiglottis before hitting vallecular space. Suspect this may be contributing to penetration/aspiration, as well. Unsure if this is pt's baseline or possibly acute-on-chronic dysphagia in setting of acute illness. Pt denies hx PNA. Discussed options w/ pt and safest PO diet (which he desired) would be: dysphagia level IV (mech soft, no mixed consistencies) diet, honey-thick liquids; no straws, small drinks; ok for couple sips/chips b/t meals & after oral care only; monitor for fatigue; meds whole w/ puree. Would rec cont'd dysphagia tx. Provided home mgt edu. Pt stated understanding. May benefit from OP MBS when showing improved strength in tx to determine if diet upgrade appropriate and may help w/ decision making.          IP SLP Goals       10/20/17 1557 10/20/17 1329 10/19/17 1146    Repeat Evaluation Needed    Swallow Skills to Level that Repeat Evaluation Indicated- SLP, Date Established   10/19/17  -RD (r) RK (t) RD (c)    Swallow Skills to Level that Repeat Evaluation Indicated- SLP, Time to Achieve   by discharge  -RD (r) RK (t) RD (c)    Swallow Skills to Level that Repeat Evaluation Indicated- SLP, Date Goal Reviewed 10/20/17  -AC 10/20/17  -VW 10/19/17  -RD (r) RK (t) RD (c)    Swallow Skills to Level that Repeat Evaluation Indicated- SLP, Outcome goal ongoing  -AC goal ongoing  -VW goal ongoing  -RD (r) RK (t) RD (c)      User Key  (r) = Recorded By, (t) = Taken By, (c) = Cosigned By    Initials Name Provider Type    AC Cyndee Bustillos, MS CCC-SLP Speech and Language Pathologist    VANESSA Sullivan, MS CCC-SLP Speech and Language Pathologist    VW Nora Grant MA, CFY-SLP Speech and Language Pathologist    KELLY Mon, Speech Therapy Student Speech Therapy Student        Time Calculation:         Time Calculation- SLP       10/20/17 1559 10/20/17 1558 10/20/17 1332    Time  Calculation- SLP    SLP Start Time  1330  - 1245  -    Total Timed Code Minutes- SLP 10 minute(s)   for caregiver instruction ([id number])  -      SLP Received On  10/20/17  - 10/20/17  -      User Key  (r) = Recorded By, (t) = Taken By, (c) = Cosigned By    Initials Name Provider Type     Cyndee Bustillos, MS CCC-SLP Speech and Language Pathologist     Nora Grant MA, CFY-SLP Speech and Language Pathologist        Therapy Charges for Today     Code Description Service Date Service Provider Modifiers Qty    [id number]  ST FIBEROPTIC ENDO EVAL SWALL 8 10/20/2017 Cyndee Bustillos, MS CCC-SLP GN 1    [id number] HC ST SELF CARE/MGMT/TRAIN EA 15 MIN 10/20/2017 Cyndee Bustillos MS CCC-SLP GN 1               Cyndee Bustillos MS LEVAR-SLP  10/20/2017

## 2017-10-20 NOTE — PROGRESS NOTES
Continued Stay Note  [facility]     Patient Name: Jonah Hannon  MRN: [id number]  Today's Date: 10/20/2017    Admit Date: 10/11/2017          Discharge Plan       10/20/17 1606    Case Management/Social Work Plan    Plan BSC    Patient/Family In Agreement With Plan yes    Additional Comments Bedside commode in room delivered from [facility]. Pt states that this bedside commode won't work as he needs one with a wide base to sit on and place amputated leg on. Contacted Sabiha at [facility] and they do not carry this type and will have to order it Pt  needs BSC today as he is medically ready for discharge. Referral made to [facility]. Spoke with Janeen at [facility]  and they carry this type of BSC. [facility] will deliver it to pt room today before discharge. Pt agreeable to additional $50 fee required for bariatric BSC. No further d/c needs. [facility] picked up other BSC in pt room.              Discharge Codes     None        Expected Discharge Date and Time     Expected Discharge Date Expected Discharge Time    Oct 20, 2017             Caitlin Mills RN

## 2017-10-20 NOTE — PROGRESS NOTES
Continued Stay Note  [facility]     Patient Name: Jonah Hannon  MRN: [id number]  Today's Date: 10/20/2017    Admit Date: 10/11/2017          Discharge Plan       10/20/17 1143    Case Management/Social Work Plan    Plan discharge plan    Patient/Family In Agreement With Plan yes    Additional Comments CM met with pt. at bedside.  Pt. said his son or a friend would be able to transport him home at discharge. Pt.'s home health has been arranged with [facility].  Pt's bariatric bedside commode has been ordered through [facility] and will be brought to pt's room today.   Pt. denied having any other discharge needs.  Pt's discharge address is [address] in [place]      10/20/17 1011    Case Management/Social Work Plan    Plan discharge planning    Patient/Family In Agreement With Plan yes    Additional Comments CM attempted to visit pt. this morning, however he was out for x-ray.  CM will meet with pt. at a later time to confirm discharge planning needs.              Discharge Codes     None        Expected Discharge Date and Time     Expected Discharge Date Expected Discharge Time    Oct 18, 2017             DAVE Gutierrez

## 2017-10-20 NOTE — PLAN OF CARE
Problem: Patient Care Overview (Adult)  Goal: Plan of Care Review  Outcome: Ongoing (interventions implemented as appropriate)    10/20/17 1221   Coping/Psychosocial Response Interventions   Plan Of Care Reviewed With patient   Patient Care Overview   Progress improving   Outcome Evaluation   Outcome Summary/Follow up Plan VSS, no complaints       Goal: Discharge Needs Assessment  Outcome: Ongoing (interventions implemented as appropriate)    10/20/17 1221   Discharge Needs Assessment   Concerns To Be Addressed patient refuses services;other (see comments)  (pt refuses rehab)   Readmission Within The Last 30 Days no previous admission in last 30 days   Equipment Needed After Discharge commode  (bariatric bedside commode to be delivered to patients home )   Current Discharge Risk physical impairment;chronically ill   Discharge Disposition home healthcare service   Current Health   Anticipated Changes Related to Illness none   Self-Care   Equipment Currently Used at Home walker, rolling;wheelchair;prosthesis   Living Environment   Transportation Available car;family or friend will provide         Problem: Health Knowledge, Opportunity for Enhanced (Adult,NICU,[place],Obstetrics,Pediatric)  Goal: Knowledgeable about Health Subject/Topic  Outcome: Ongoing (interventions implemented as appropriate)    10/20/17 1221   Health Knowledge, Opportunity for Enhanced (Adult,NICU,,Obstetrics,Pediatric)   Knowledgeable about Health Subject/Topic making progress toward outcome         Problem: Activity Intolerance (Adult)  Goal: Activity Tolerance  Outcome: Ongoing (interventions implemented as appropriate)    10/20/17 1221   Activity Intolerance (Adult)   Activity Tolerance making progress toward outcome       Goal: Effective Energy Conservation Techniques  Outcome: Ongoing (interventions implemented as appropriate)    10/20/17 1221   Activity Intolerance (Adult)   Effective Energy Conservation Techniques making progress  toward outcome         Problem: Skin Integrity Impairment, Risk/Actual (Adult)  Goal: Skin Integrity/Wound Healing  Outcome: Ongoing (interventions implemented as appropriate)    10/20/17 1221   Skin Integrity Impairment, Risk/Actual (Adult)   Skin Integrity/Wound Healing making progress toward outcome         Problem: Pressure Ulcer Risk (Micha Scale) (Adult,Obstetrics,Pediatric)  Goal: Identify Related Risk Factors and Signs and Symptoms  Outcome: Ongoing (interventions implemented as appropriate)    10/20/17 1221   Pressure Ulcer Risk (Micha Scale)   Related Risk Factors (Pressure Ulcer Risk (Micha Scale)) body weight extremes;infection;mobility impaired       Goal: Skin Integrity  Outcome: Ongoing (interventions implemented as appropriate)    10/20/17 1221   Pressure Ulcer Risk (Micha Scale) (Adult,Obstetrics,Pediatric)   Skin Integrity making progress toward outcome

## 2017-10-20 NOTE — DISCHARGE SUMMARY
[facility] Medicine Services  DISCHARGE SUMMARY    Patient Name: Jonah Hannon  : 1940  MRN: [id number]    Date of Admission: 10/11/2017  Date of Discharge:    Length of Stay: 9  Primary Care Physician: Wiliam Chu MD    Consults     Date and Time Order Name Status Description    10/19/2017 0855 Inpatient Consult to Infectious Diseases Completed     10/12/2017 1723 Inpatient Consult to Colorectal Surgery Completed     10/12/2017 0648 Inpatient Consult to Hospitalist Completed           Hospital Course     Presenting Problem:   Abnormal stress test [R94.39]  Unstable angina [I20.0]  ACS (acute coronary syndrome) [I24.9]  Colon cancer [C18.9]      Active Hospital Problems (** Indicates Principal Problem)    Diagnosis Date Noted   • **S/P right hemicolectomy [Z90.49] 10/14/2017   • Atrial fibrillation [I48.91] 10/19/2017   • Dysphagia [R13.10] 10/19/2017   • Anemia [D64.9] 10/19/2017   • Renal insufficiency [N28.9] 10/19/2017   • Pneumonia [J18.9] 10/16/2017   • Sacral wound [S31.000A] 10/14/2017   • CAD in native artery-noncritical by [facility] 10/12/17 [I25.10] 10/14/2017   • Iron deficiency anemia due to chronic blood loss [D50.0] 10/12/2017   • Colon cancer [C18.9] 10/12/2017   • Acquired hypothyroidism [E03.9] 10/12/2017   • Type 2 diabetes mellitus with circulatory disorder [E11.59] 10/12/2017   • Essential hypertension [I10] 10/12/2017   • MARISOL (obstructive sleep apnea) [G47.33] 10/12/2017      Resolved Hospital Problems    Diagnosis Date Noted Date Resolved   • Acute respiratory failure with hypoxia [J96.01] 10/16/2017 10/20/2017   • ACS (acute coronary syndrome) [I24.9] 10/12/2017 10/12/2017   • Unstable angina [I20.0] 10/11/2017 10/12/2017          Hospital Course:  Jonah Hannon is a 77 y.o. male presented to outside hospital for BRBPR. He underwent a CT scan and colonoscopy at OSH and was found to have colon cancer. He was transferred to [facility] under Dr. Gooden for cardiac  evaluation for abn stress, C showed noncritical CAD. Dr. Gooden asks Hospitalist to take over as attending on 10/13. Patient underwent right hemicolectomy on 10/14 with Dr Worthy for a T2 tumor. On 10/16 was found to have pneumonia and started on Zosyn and doxy. 10/17 blood culture positive for staph neg, started on Vanc, BCID PCR came back not staph sp NOT aureus, likely containment, MBS revealed silent aspiration. 10/19 ID consulted for abx recommendations and to assist with follow up.  Underwent speech eval and was placed on modified diet.  Had a sacral wound that had been present for some time prior to hospitalization which was followed by wound ostomy team.  Some mild loose stools negative for C. difficile started on a probiotic.  His pneumonia improved and he was transitioned to oral Augmentin.  Operatively he progressed well.  Pt continued to have mild anemia secondary to chronic blood loss from colon cancer and was started on iron supplement.  He did have history of atrial fib and was on Coumadin, this was resumed once agreed by colorectal surgery.  He continued to have generalized weakness with PT/OT following and recommendations for home health.  Haste management arranged for home health services including skilled nursing, PT, OT and speech therapy.  DME was provided.  Patient is hemodynamically stable and ready for discharge.  He's been cleared by all services to discharge home.  He will discharge with home health as noted.  Follow-up with PCP in 1 week of discharge.  Follow-up with colorectal surgery, infectious disease, and cardiology per their recommendations.    Procedure(s):  GAUDENCIO COLECTOMY AND UMBILICAL HERNIA REPAIR         Day of Discharge     HPI:   Pt is seen resting upright in bed in no acute distress at 11:45 AM.  No visitors at bedside.  He is awake, alert and oriented.  States that he wishes to discharge home today.  Is declining any short-term rehabilitation.  Agreeable only to home  "health.  States he is tolerating his dysphasia diet \"as best he can\" but does not like it.  Denies nausea, vomiting, chest pain, shortness of air.  Feels he is significantly improved.  Is declining rehabilitation and asking for discharge home.  States that a friend who lives with him will be coming to pick him up later today.  Plans to discharge home pending final recommendations from infectious disease and colorectal surgery.  No new issues.    Review of Systems   Gen- No fevers, chills  CV- No chest pain, palpitations  Resp- No cough, dyspnea  GI- No N/V/D, abd pain, sore abdominal incision only (improved)    Otherwise complete ROS is negative except as mentioned in the HPI.      Vital Signs: Temp:  [98.2 °F (36.8 °C)-98.3 °F (36.8 °C)] 98.2 °F (36.8 °C)  Heart Rate:  [70-90] 90  Resp:  [16-18] 18  BP: (128-159)/(77-94) 159/86     Physical Exam:  Constitutional -no acute distress, non toxic, resting upright in bed with no visitors at bedside.  HEENT-NCAT, mucous membranes moist  CV-RRR, S1 S2 normal, no m/r/g  Resp-few very faint RLL rales, otherwise CTA, loose cough and lung sounds improved significantly with cough.  No obvious respiratory distress.  Speaking in full sentences.  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  Left foot history of Charcot.  Neuro-alert and oriented x 3, speech clear, moves all extremities, follows commands  Psych-normal affect, calm and cooperative  Skin-  Abdominal incision with dressing noted,coverderm, dressing noted, old staining noted.       Pertinent  and/or Most Recent Results         Results from last 7 days  Lab Units 10/20/17  0453 10/19/17  0442 10/18/17  0508 10/17/17  0554 10/16/17  0506 10/15/17  0545   WBC 10*3/mm3 6.02  --  7.20 8.88 13.11* 11.79*   HEMOGLOBIN g/dL 9.3*  --  8.8* 8.7* 9.8* 8.6*   HEMATOCRIT % 30.2*  --  27.7* 28.4* 31.0* 28.5*   PLATELETS 10*3/mm3 303  --  244 234 253 241   SODIUM " mmol/L 137  --  133 135  --  136   POTASSIUM mmol/L 3.6 4.1 3.3* 3.9  --  4.2   CHLORIDE mmol/L 103  --  99 99  --  105   CO2 mmol/L 25.0  --  28.0 23.0  --  27.0   BUN mg/dL 12  --  14 15  --  15   CREATININE mg/dL 0.90  --  1.00 1.20  --  1.00   GLUCOSE mg/dL 152*  --  138* 149*  --  182*   CALCIUM mg/dL 9.1  --  8.6* 8.7  --  8.7       Results from last 7 days  Lab Units 10/20/17  0453 10/19/17  1536   PROTIME Seconds 11.2 11.3   INR  1.03 1.04          Invalid input(s): TG, LDLREALC    Results from last 7 days  Lab Units 10/14/17  0457   TSH mIU/mL 2.478   HEMOGLOBIN A1C % 5.50     Brief Urine Lab Results  (Last result in the past 365 days)      Color   Clarity   Blood   Leuk Est   Nitrite   Protein   CREAT   Urine HCG        10/14/17 1910 Yellow Cloudy(A) Negative Negative Negative 30 mg/dL (1+)(A)               Blood Culture   Date Value Ref Range Status   10/17/2017 No growth at 3 days  Preliminary   10/17/2017 No growth at 3 days  Preliminary   10/16/2017 No growth at 4 days  Preliminary   10/16/2017 Abnormal Stain (A)  Preliminary   10/16/2017 Staphylococcus, coagulase negative (A)  Preliminary     Comment:     Suggests contamination  Susceptibility will not be done unless Doctor notifies Lab       Urine Culture   Date Value Ref Range Status   10/14/2017 No growth at 2 days  Final     Respiratory Culture   Date Value Ref Range Status   10/19/2017 Light growth (2+) Normal Respiratory Verónica  Preliminary       Imaging Results (all)     Procedure Component Value Units Date/Time    XR Chest 1 View [[id number]] Collected:  10/16/17 1329     Updated:  10/16/17 1604    Narrative:       EXAMINATION: XR CHEST 1 VW- 10/16/2017     INDICATION: cough, hypoxia; C18.9-Malignant neoplasm of colon,  unspecified      COMPARISON: NONE     FINDINGS: Portable chest reveals heart to be enlarged with ill-defined  opacification within the right lung base and small right pleural  effusion. Small bilateral pleural effusions with  prominence of the  pulmonary vascularity.           Impression:       Ill-defined opacification within the right lung base with  small bilateral pleural effusions. Prominence of the pulmonary  vascularity.     D:  10/16/2017  E:  10/16/2017     This report was finalized on 10/16/2017 4:01 PM by Dr. Sun Hardin MD.       XR Chest 1 View [[id number]] Collected:  10/17/17 0847     Updated:  10/17/17 1202    Narrative:       EXAMINATION: XR CHEST 1 VW-10/17/2017:      INDICATION: Pneumonia, RLL; C18.9-Malignant neoplasm of colon,  unspecified.      COMPARISON: NONE.     FINDINGS:   1. Ill-defined lacy nodular airspace opacity is seen in the right mid  and lower lung.  2. The left lung is clear and the heart is normal.           Impression:       No change is noted from yesterday. Persistent ill-defined  airspace opacities seen in the right mid and lower lung with blunting of  the right costophrenic angle. No interval change is noted. The left lung  remains clear.     D:  10/17/2017  E:  10/17/2017     This report was finalized on 10/17/2017 12:00 PM by Dr. Wiliam Brar MD.       FL Video Swallow With Speech [[id number]] Collected:  10/17/17 1454     Updated:  10/17/17 1546    Narrative:       EXAMINATION: FL VIDEO SWALLOW W SPEECH-     INDICATION: Dysphagia; Z74.09-Other reduced mobility; C18.9-Malignant  neoplasm of colon, unspecified; Z74.09-Other reduced mobility         TECHNIQUE: 1 minute and 6 seconds of fluoroscopic time was used for this  exam. 1 associated image was saved. The patient was evaluated in the  seated lateral position while taking a variety of consistencies of  barium by mouth under the direction of speech pathology.     COMPARISON: NONE     FINDINGS: There was aspiration with multiple large sized sips of thin  barium from a straw. There was no penetration and no aspiration with  small sized single sips of thin barium, nectar consistency, pudding, or  solid consistency barium.           Impression:       Fluoroscopy provided for a modified barium swallow. Please  see speech therapy report for full details and recommendations.         This report was finalized on 10/17/2017 3:44 PM by Dr. Wilbert Isabel MD.       XR Chest 1 View [[id number]] Collected:  10/19/17 0845     Updated:  10/19/17 1058    Narrative:       EXAMINATION: XR CHEST 1 VW-10/19/2017:      INDICATION: Pneumonia; R13.13-Dysphagia, pharyngeal phase;  C18.9-Malignant neoplasm of colon, unspecified; Z74.09-Other reduced  mobility; Z74.09-Other reduced mobility.      COMPARISON: 10/17/2017.     FINDINGS: The heart is within the limits of normal. There is airspace  disease in the right lower lobe with minimal disease of the left lung  base laterally. There is no pneumothorax and there has been no  significant change.           Impression:       There has been no change since the previous examination of  10/17/2017.     D:  10/19/2017  E:  10/19/2017     This report was finalized on 10/19/2017 10:56 AM by Dr. Wilbert Isabel MD.       XR Chest PA & Lateral [[id number]] Collected:  10/20/17 1013     Updated:  10/20/17 1129    Narrative:       EXAMINATION: XR CHEST, PA AND LATERAL-10/20/2017:      INDICATION: Evaluate right lower lobe pneumonia; R13.13-Dysphagia,  pharyngeal phase; C18.9-Malignant neoplasm of colon, unspecified;  Z74.09-Other reduced mobility; Z74.09-Other reduced mobility.      COMPARISON: Chest x-ray dated 10/19/2017.     FINDINGS: Cardiac size borderline enlarged without significant change.  Interval clearing of right lower lobe pulmonary opacifications from  prior. No new focal airspace opacity. No pneumothorax or large pleural  effusion.          Impression:       Slight interval clearing of right lower lobe pulmonary  opacifications from prior. No new parenchymal disease process.     D:  10/20/2017  E:  10/20/2017     This report was finalized on 10/20/2017 11:27 AM by Dr. Cy Frias.             Results for orders placed  during the hospital encounter of 10/11/17   Adult Transthoracic Echo Complete W/ Cont if Necessary Per Protocol    Narrative · Left ventricular systolic function is hyperdynamic (EF > 70).  · Right ventricular cavity is dilated.  · Left atrial cavity size is dilated.  · calcification of the aortic valve  · Mild aortic valve stenosis is present.           Order Current Status    Legionella Antigen, Urine - Urine, Clean Catch In process    S. Pneumo Ag Urine or CSF - Urine, Urine, Clean Catch In process    Blood Culture - Blood, Preliminary result    Blood Culture - Blood, Preliminary result    Blood Culture - Blood, Preliminary result    Blood Culture - Blood, Preliminary result    Respiratory Culture - Sputum, Cough Preliminary result          Discharge Details      Jonah Hannon   Home Medication Instructions SHAWN:[id number]    Printed on:10/20/17 4757   Medication Information                      acetaminophen (TYLENOL) 325 MG tablet  Take 650 mg by mouth Every 4 (Four) Hours As Needed for Mild Pain .             aluminum-magnesium hydroxide-simethicone (MAALOX MAX) 400-400-40 MG/5ML suspension  Take 30 mL by mouth Every 6 (Six) Hours As Needed for Indigestion or Heartburn.             amoxicillin-clavulanate (AUGMENTIN) 875-125 MG per tablet  Take 1 tablet by mouth Every 12 (Twelve) Hours for 10 days. See hard rx on chart per ID  Indications: Skin and Soft Tissue Infection             calcium carbonate (TUMS) 500 MG chewable tablet  Chew 500 mg Daily As Needed for Indigestion or Heartburn.             Cholecalciferol 5000 units tablet  Take 1 tablet by mouth Daily.             diphenhydrAMINE (BENADRYL) 50 MG capsule  Take 50 mg by mouth At Night As Needed for Itching or Sleep.             ferrous sulfate 325 (65 FE) MG tablet  Take 1 tablet by mouth 2 (Two) Times a Day With Meals.             HYDROcodone-acetaminophen (NORCO) 7.5-325 MG per tablet  Take 1 tablet by mouth Every 4 (Four) Hours As Needed for  Moderate Pain .             lactobacillus acidophilus (RISAQUAD) capsule capsule  Take 1 capsule by mouth Daily for 30 days. See hard rx per ID on chart             levothyroxine (SYNTHROID, LEVOTHROID) 75 MCG tablet  Take 75 mcg by mouth Daily.             metFORMIN (GLUCOPHAGE) 1000 MG tablet  Take 1,500 mg by mouth Daily With Breakfast & Dinner. 1500 IN THE MORNING WITH BREAKFAST. 1000 AT NIGHT WITH DINNER.             metoprolol tartrate (LOPRESSOR) 25 MG tablet  Take 0.5 tablets by mouth Every 12 (Twelve) Hours.             Multiple Vitamin (THERAGRAN PO)  Take 1 tablet by mouth Daily.             valsartan (DIOVAN) 160 MG tablet  Take 160 mg by mouth Daily.             vitamin D (ERGOCALCIFEROL) 53229 units capsule capsule  Take 5,000 Units by mouth Daily.             warfarin (COUMADIN) 7.5 MG tablet  Take 7.5 mg by mouth Daily. 10 MG Monday, Wednesday and Friday. 7.5 mg Sunday, Tuesday, Thursday, and Saturday.             Zinc Sulfate 220 (50 Zn) MG tablet  Take 1 tablet by mouth Daily.                   Discharge Disposition:  Home or Self Care    Discharge Diet:  Diet Instructions     Diet: Dysphagia; Nectar / Syrup Thick Liquids; Mechanical Soft       Discharge Diet:  Dysphagia   Fluid Consistency:  Nectar / Syrup Thick Liquids   Pureed Options:  Mechanical Soft   Fluid Restriction per day:  Other (See comments)   No Mixed Consistencies, no straws                 Discharge Activity:  As tolerated.  Case management to arrange for home health/PT/OT/skilled nursing/wound care    Special Instructions:  Postop wound care per colorectal surgery recommendations.      Activity Instructions     Activity as Tolerated           Measure Blood Pressure                     No future appointments.    Additional Instructions for the Follow-ups that You Need to Schedule     Additional Discharge Follow-Up (Specify Provider)    As directed    To:  Dr Gooden per his recs       Discharge Follow-Up With Specified Provider     As directed    To:  Dr Ward per his recs       Discharge Follow-up with PCP    As directed    Follow Up Details:  Monday of next week with PT/INR at that time.       Discharge Follow-up with Specialty    As directed    Specialty:  Dr Worthy per his recs                   Time Spent on Discharge:  60 minutes    Marly Martinez, [place]  10/20/17  2:35 PM

## 2017-10-20 NOTE — PROGRESS NOTES
Continued Stay Note  [facility]     Patient Name: Jonah Hannon  MRN: [id number]  Today's Date: 10/20/2017    Admit Date: 10/11/2017          Discharge Plan       10/20/17 1011    Case Management/Social Work Plan    Plan discharge planning    Patient/Family In Agreement With Plan yes    Additional Comments CM attempted to visit pt. this morning, however he was out for x-ray.  CM will meet with pt. at a later time to confirm discharge planning needs.              Discharge Codes     None        Expected Discharge Date and Time     Expected Discharge Date Expected Discharge Time    Oct 18, 2017             DAVE Gutierrez

## 2017-10-21 LAB
BACTERIA SPEC AEROBE CULT: NORMAL
BACTERIA SPEC RESP CULT: NORMAL
BACTERIA SPEC RESP CULT: NORMAL
GRAM STN SPEC: NORMAL
L PNEUMO1 AG UR QL IA: NEGATIVE

## 2017-10-22 LAB
BACTERIA SPEC AEROBE CULT: ABNORMAL
BACTERIA SPEC AEROBE CULT: ABNORMAL
BACTERIA SPEC AEROBE CULT: NORMAL
BACTERIA SPEC AEROBE CULT: NORMAL
GRAM STN SPEC: ABNORMAL
ISOLATED FROM: ABNORMAL

## 2017-10-30 ENCOUNTER — TRANSCRIBE ORDERS (OUTPATIENT)
Dept: LAB | Facility: HOSPITAL | Age: 77
End: 2017-10-30

## 2017-10-30 ENCOUNTER — LAB (OUTPATIENT)
Dept: LAB | Facility: HOSPITAL | Age: 77
End: 2017-10-30
Attending: INTERNAL MEDICINE

## 2017-10-30 DIAGNOSIS — R13.14 DYSPHAGIA, PHARYNGOESOPHAGEAL PHASE: ICD-10-CM

## 2017-10-30 DIAGNOSIS — C18.9 MALIGNANT NEOPLASM OF COLON, UNSPECIFIED PART OF COLON (HCC): ICD-10-CM

## 2017-10-30 DIAGNOSIS — B95.2 ENTEROCOCCUS FAECALIS INFECTION: ICD-10-CM

## 2017-10-30 DIAGNOSIS — A49.8 BACTERIAL INFECTION DUE TO PROTEUS MIRABILIS: ICD-10-CM

## 2017-10-30 DIAGNOSIS — J69.0 PNEUMONITIS DUE TO INHALATION OF FOOD OR VOMITUS (HCC): ICD-10-CM

## 2017-10-30 DIAGNOSIS — J69.0 PNEUMONITIS DUE TO INHALATION OF FOOD OR VOMITUS (HCC): Primary | ICD-10-CM

## 2017-10-30 LAB
ALBUMIN SERPL-MCNC: 4 G/DL (ref 3.2–4.8)
ALBUMIN/GLOB SERPL: 1.6 G/DL (ref 1.5–2.5)
ALP SERPL-CCNC: 95 U/L (ref 25–100)
ALT SERPL W P-5'-P-CCNC: 22 U/L (ref 7–40)
ANION GAP SERPL CALCULATED.3IONS-SCNC: 8 MMOL/L (ref 3–11)
AST SERPL-CCNC: 13 U/L (ref 0–33)
BASOPHILS # BLD AUTO: 0.02 10*3/MM3 (ref 0–0.2)
BASOPHILS NFR BLD AUTO: 0.2 % (ref 0–1)
BILIRUB SERPL-MCNC: 0.2 MG/DL (ref 0.3–1.2)
BUN BLD-MCNC: 23 MG/DL (ref 9–23)
BUN/CREAT SERPL: 20.9 (ref 7–25)
CALCIUM SPEC-SCNC: 9.5 MG/DL (ref 8.7–10.4)
CHLORIDE SERPL-SCNC: 100 MMOL/L (ref 99–109)
CO2 SERPL-SCNC: 26 MMOL/L (ref 20–31)
CREAT BLD-MCNC: 1.1 MG/DL (ref 0.6–1.3)
CRP SERPL-MCNC: 3.48 MG/DL (ref 0–1)
DEPRECATED RDW RBC AUTO: 51.8 FL (ref 37–54)
EOSINOPHIL # BLD AUTO: 0.15 10*3/MM3 (ref 0–0.3)
EOSINOPHIL NFR BLD AUTO: 1.7 % (ref 0–3)
ERYTHROCYTE [DISTWIDTH] IN BLOOD BY AUTOMATED COUNT: 16 % (ref 11.3–14.5)
ERYTHROCYTE [SEDIMENTATION RATE] IN BLOOD: 56 MM/HR (ref 0–20)
GFR SERPL CREATININE-BSD FRML MDRD: 65 ML/MIN/1.73
GLOBULIN UR ELPH-MCNC: 2.5 GM/DL
GLUCOSE BLD-MCNC: 130 MG/DL (ref 70–100)
HCT VFR BLD AUTO: 33.1 % (ref 38.9–50.9)
HGB BLD-MCNC: 9.9 G/DL (ref 13.1–17.5)
IMM GRANULOCYTES # BLD: 0.03 10*3/MM3 (ref 0–0.03)
IMM GRANULOCYTES NFR BLD: 0.3 % (ref 0–0.6)
LYMPHOCYTES # BLD AUTO: 0.86 10*3/MM3 (ref 0.6–4.8)
LYMPHOCYTES NFR BLD AUTO: 9.6 % (ref 24–44)
MCH RBC QN AUTO: 26.6 PG (ref 27–31)
MCHC RBC AUTO-ENTMCNC: 29.9 G/DL (ref 32–36)
MCV RBC AUTO: 89 FL (ref 80–99)
MONOCYTES # BLD AUTO: 0.73 10*3/MM3 (ref 0–1)
MONOCYTES NFR BLD AUTO: 8.1 % (ref 0–12)
NEUTROPHILS # BLD AUTO: 7.19 10*3/MM3 (ref 1.5–8.3)
NEUTROPHILS NFR BLD AUTO: 80.1 % (ref 41–71)
PLATELET # BLD AUTO: 530 10*3/MM3 (ref 150–450)
PMV BLD AUTO: 10 FL (ref 6–12)
POTASSIUM BLD-SCNC: 4.7 MMOL/L (ref 3.5–5.5)
PROT SERPL-MCNC: 6.5 G/DL (ref 5.7–8.2)
RBC # BLD AUTO: 3.72 10*6/MM3 (ref 4.2–5.76)
SODIUM BLD-SCNC: 134 MMOL/L (ref 132–146)
WBC NRBC COR # BLD: 8.98 10*3/MM3 (ref 3.5–10.8)

## 2017-10-30 PROCEDURE — 85652 RBC SED RATE AUTOMATED: CPT | Performed by: INTERNAL MEDICINE

## 2017-10-30 PROCEDURE — 86140 C-REACTIVE PROTEIN: CPT | Performed by: INTERNAL MEDICINE

## 2017-10-30 PROCEDURE — 80053 COMPREHEN METABOLIC PANEL: CPT | Performed by: INTERNAL MEDICINE

## 2017-10-30 PROCEDURE — 36415 COLL VENOUS BLD VENIPUNCTURE: CPT | Performed by: INTERNAL MEDICINE

## 2017-10-30 PROCEDURE — 85025 COMPLETE CBC W/AUTO DIFF WBC: CPT | Performed by: INTERNAL MEDICINE

## 2017-11-13 ENCOUNTER — OUTSIDE FACILITY SERVICE (OUTPATIENT)
Dept: HOSPITALIST | Facility: HOSPITAL | Age: 77
End: 2017-11-13

## 2017-11-13 PROCEDURE — G0180 MD CERTIFICATION HHA PATIENT: HCPCS | Performed by: INTERNAL MEDICINE

## 2022-07-01 ENCOUNTER — TRANSCRIBE ORDERS (OUTPATIENT)
Dept: HOME HEALTH SERVICES | Facility: HOME HEALTHCARE | Age: 82
End: 2022-07-01

## 2022-07-01 ENCOUNTER — HOME HEALTH ADMISSION (OUTPATIENT)
Dept: HOME HEALTH SERVICES | Facility: HOME HEALTHCARE | Age: 82
End: 2022-07-01

## 2022-07-01 DIAGNOSIS — S88.119A AMPUTATION BELOW KNEE: Primary | ICD-10-CM

## 2023-07-21 ENCOUNTER — TRANSCRIBE ORDERS (OUTPATIENT)
Dept: ADMINISTRATIVE | Facility: HOSPITAL | Age: 83
End: 2023-07-21
Payer: MEDICARE

## 2023-07-21 DIAGNOSIS — C69.61 MALIGNANT NEOPLASM OF RIGHT ORBIT: Primary | ICD-10-CM

## (undated) DEVICE — WIPE THERAWASH SLV SPEC CARE 2PK

## (undated) DEVICE — Device

## (undated) DEVICE — KT MANIFOLD CATHLAB CUST

## (undated) DEVICE — DRSNG WND BORDR/ADHS NONADHR/GZ LF 4X10IN STRL

## (undated) DEVICE — SUT PROLN 2/0 PC3 8833H

## (undated) DEVICE — GLIDESHEATH SLENDER STAINLESS STEEL KIT: Brand: GLIDESHEATH SLENDER

## (undated) DEVICE — CATH DIAG EXPO .045 FL3.5 5F 100CM

## (undated) DEVICE — SAFESECURE,SECUREMENT,FOLEY CATH,STERILE: Brand: MEDLINE

## (undated) DEVICE — SUT PDS 1 CTX 36IN VIO PDP371T

## (undated) DEVICE — CATH DIAG EXPO M/ PK 5F FL4/FR4 PIG

## (undated) DEVICE — SUT VIC 12X27 D8116 BX/12

## (undated) DEVICE — MEDI-VAC NON-CONDUCTIVE SUCTION TUBING: Brand: CARDINAL HEALTH

## (undated) DEVICE — GLV SURG SENSICARE MICRO PF LF 8.5 STRL

## (undated) DEVICE — AIRWY SZ11

## (undated) DEVICE — MEDI-VAC YANKAUER SUCTION HANDLE W/BULBOUS TIP: Brand: CARDINAL HEALTH

## (undated) DEVICE — GW INQWIRE FC PTFE STD J/1.5 .035 260

## (undated) DEVICE — 100% SILICONE FOLEY TRAY,16 FR/CH (5.3 MM), 5 ML CATHETER PRE-CONNECTED TO 2000 ML DRAINAGE BAG WITH LUER-LOCK SAMPLING: Brand: DOVER

## (undated) DEVICE — PK CATH CARD 10

## (undated) DEVICE — MODEL AT P54, P/N 700608-035KIT CONTENTS: HAND CONTROLLER, 3-WAY HIGH-PRESSURE STOPCOCK WITH ROTATING END AND PREMIUM HIGH-PRESSURE TUBING: Brand: ANGIOTOUCH® KIT

## (undated) DEVICE — SOL LR 1000ML

## (undated) DEVICE — DEV COMP RAD PRELUDESYNC 24CM

## (undated) DEVICE — MODEL BT2000 P/N 700287-012KIT CONTENTS: MANIFOLD WITH SALINE AND CONTRAST PORTS, SALINE TUBING WITH SPIKE AND HAND SYRINGE, TRANSDUCER: Brand: BT2000 AUTOMATED MANIFOLD KIT

## (undated) DEVICE — CATH DIAG EXPO .045 FL3  5F 100CM

## (undated) DEVICE — CANNULA,OXY,ADULT,SUPERSOFT,W/7'TUB,UC: Brand: MEDLINE

## (undated) DEVICE — FLTR HME STR UNIV W/SMPL PORT

## (undated) DEVICE — LEX GENERAL ABDOMINAL SPLIT: Brand: MEDLINE INDUSTRIES, INC.